# Patient Record
Sex: MALE | Race: WHITE | NOT HISPANIC OR LATINO | Employment: FULL TIME | URBAN - METROPOLITAN AREA
[De-identification: names, ages, dates, MRNs, and addresses within clinical notes are randomized per-mention and may not be internally consistent; named-entity substitution may affect disease eponyms.]

---

## 2021-04-18 ENCOUNTER — HOSPITAL ENCOUNTER (OUTPATIENT)
Dept: MEDSURG UNIT | Facility: HOSPITAL | Age: 26
End: 2021-04-20
Attending: STUDENT IN AN ORGANIZED HEALTH CARE EDUCATION/TRAINING PROGRAM | Admitting: STUDENT IN AN ORGANIZED HEALTH CARE EDUCATION/TRAINING PROGRAM

## 2021-04-18 LAB
ABS NEUT (OLG): 9.58 X10(3)/MCL (ref 2.1–9.2)
ALBUMIN SERPL-MCNC: 4.5 GM/DL (ref 3.5–5)
ALBUMIN/GLOB SERPL: 1.5 RATIO (ref 1.1–2)
ALP SERPL-CCNC: 68 UNIT/L (ref 40–150)
ALT SERPL-CCNC: 54 UNIT/L (ref 0–55)
AMPHET UR QL SCN: NEGATIVE
APPEARANCE, UA: CLEAR
AST SERPL-CCNC: 36 UNIT/L (ref 5–34)
BACTERIA #/AREA URNS AUTO: ABNORMAL /HPF
BARBITURATE SCN PRESENT UR: NEGATIVE
BASOPHILS # BLD AUTO: 0 X10(3)/MCL (ref 0–0.2)
BASOPHILS NFR BLD AUTO: 0 %
BENZODIAZ UR QL SCN: NEGATIVE
BILIRUB SERPL-MCNC: 1 MG/DL
BILIRUB UR QL STRIP: NEGATIVE
BILIRUBIN DIRECT+TOT PNL SERPL-MCNC: 0.3 MG/DL (ref 0–0.5)
BILIRUBIN DIRECT+TOT PNL SERPL-MCNC: 0.7 MG/DL (ref 0–0.8)
BUN SERPL-MCNC: 9.3 MG/DL (ref 8.9–20.6)
CALCIUM SERPL-MCNC: 9.9 MG/DL (ref 8.4–10.2)
CANNABINOIDS UR QL SCN: NEGATIVE
CHLORIDE SERPL-SCNC: 103 MMOL/L (ref 98–107)
CHOLEST SERPL-MCNC: 208 MG/DL
CHOLEST/HDLC SERPL: 3 {RATIO} (ref 0–5)
CO2 SERPL-SCNC: 23 MMOL/L (ref 22–29)
COCAINE UR QL SCN: NEGATIVE
COLOR UR: ABNORMAL
CREAT SERPL-MCNC: 0.79 MG/DL (ref 0.73–1.18)
EOSINOPHIL # BLD AUTO: 0.3 X10(3)/MCL (ref 0–0.9)
EOSINOPHIL NFR BLD AUTO: 2 %
ERYTHROCYTE [DISTWIDTH] IN BLOOD BY AUTOMATED COUNT: 12.3 % (ref 11.5–14.5)
ETHANOL SERPL-MCNC: <10 MG/DL
GLOBULIN SER-MCNC: 3 GM/DL (ref 2.4–3.5)
GLUCOSE (UA): NEGATIVE
GLUCOSE SERPL-MCNC: 142 MG/DL (ref 74–100)
HAV IGM SERPL QL IA: NONREACTIVE
HBV CORE IGM SERPL QL IA: NONREACTIVE
HBV SURFACE AG SERPL QL IA: NONREACTIVE
HCT VFR BLD AUTO: 43.9 % (ref 40–51)
HCV AB SERPL QL IA: NONREACTIVE
HDLC SERPL-MCNC: 62 MG/DL (ref 35–60)
HGB BLD-MCNC: 15.1 GM/DL (ref 13.5–17.5)
HGB UR QL STRIP: NEGATIVE
HIV 1+2 AB+HIV1 P24 AG SERPL QL IA: NONREACTIVE
HYALINE CASTS #/AREA URNS LPF: ABNORMAL /LPF
IMM GRANULOCYTES # BLD AUTO: 0.06 10*3/UL
IMM GRANULOCYTES NFR BLD AUTO: 0 %
KETONES UR QL STRIP: ABNORMAL
LDLC SERPL CALC-MCNC: 115 MG/DL (ref 50–140)
LEUKOCYTE ESTERASE UR QL STRIP: NEGATIVE
LIPASE SERPL-CCNC: 993 U/L
LYMPHOCYTES # BLD AUTO: 1.7 X10(3)/MCL (ref 0.6–4.6)
LYMPHOCYTES NFR BLD AUTO: 14 %
MAGNESIUM SERPL-MCNC: 1.8 MG/DL (ref 1.6–2.6)
MCH RBC QN AUTO: 31.7 PG (ref 26–34)
MCHC RBC AUTO-ENTMCNC: 34.4 GM/DL (ref 31–37)
MCV RBC AUTO: 92 FL (ref 80–100)
MONOCYTES # BLD AUTO: 0.9 X10(3)/MCL (ref 0.1–1.3)
MONOCYTES NFR BLD AUTO: 7 %
NEUTROPHILS # BLD AUTO: 9.58 X10(3)/MCL (ref 2.1–9.2)
NEUTROPHILS NFR BLD AUTO: 76 %
NITRITE UR QL STRIP: NEGATIVE
OPIATES UR QL SCN: POSITIVE
PCP UR QL: NEGATIVE
PH UR STRIP.AUTO: 8 [PH] (ref 3–11)
PH UR STRIP: 8 [PH] (ref 4.5–8)
PHOSPHATE SERPL-MCNC: 1.2 MG/DL (ref 2.3–4.7)
PLATELET # BLD AUTO: 217 X10(3)/MCL (ref 130–400)
PMV BLD AUTO: 11.5 FL (ref 7.4–10.4)
POTASSIUM SERPL-SCNC: 4 MMOL/L (ref 3.5–5.1)
PROT SERPL-MCNC: 7.5 GM/DL (ref 6.4–8.3)
PROT UR QL STRIP: 30 MG/DL
RBC # BLD AUTO: 4.77 X10(6)/MCL (ref 4.5–5.9)
RBC #/AREA URNS AUTO: ABNORMAL /HPF
SARS-COV-2 AG RESP QL IA.RAPID: NEGATIVE
SODIUM SERPL-SCNC: 140 MMOL/L (ref 136–145)
SP GR UR STRIP: >1.03 (ref 1–1.03)
SQUAMOUS #/AREA URNS LPF: ABNORMAL /LPF
T PALLIDUM AB SER QL: NONREACTIVE
TRIGL SERPL-MCNC: 157 MG/DL (ref 34–140)
UROBILINOGEN UR STRIP-ACNC: NORMAL
VLDLC SERPL CALC-MCNC: 31 MG/DL
WBC # SPEC AUTO: 12.6 X10(3)/MCL (ref 4.5–11)
WBC #/AREA URNS AUTO: ABNORMAL /HPF

## 2021-04-19 LAB
ABS NEUT (OLG): 9.62 X10(3)/MCL (ref 2.1–9.2)
ALBUMIN SERPL-MCNC: 3.6 GM/DL (ref 3.5–5)
ALBUMIN/GLOB SERPL: 1.5 RATIO (ref 1.1–2)
ALP SERPL-CCNC: 53 UNIT/L (ref 40–150)
ALT SERPL-CCNC: 35 UNIT/L (ref 0–55)
AST SERPL-CCNC: 27 UNIT/L (ref 5–34)
BASOPHILS # BLD AUTO: 0 X10(3)/MCL (ref 0–0.2)
BASOPHILS NFR BLD AUTO: 0 %
BILIRUB SERPL-MCNC: 1 MG/DL
BILIRUBIN DIRECT+TOT PNL SERPL-MCNC: 0.4 MG/DL (ref 0–0.5)
BILIRUBIN DIRECT+TOT PNL SERPL-MCNC: 0.6 MG/DL (ref 0–0.8)
BUN SERPL-MCNC: 6 MG/DL (ref 8.9–20.6)
CALCIUM SERPL-MCNC: 8.7 MG/DL (ref 8.4–10.2)
CHLORIDE SERPL-SCNC: 103 MMOL/L (ref 98–107)
CO2 SERPL-SCNC: 28 MMOL/L (ref 22–29)
CREAT SERPL-MCNC: 0.64 MG/DL (ref 0.73–1.18)
EOSINOPHIL # BLD AUTO: 0 X10(3)/MCL (ref 0–0.9)
EOSINOPHIL NFR BLD AUTO: 0 %
ERYTHROCYTE [DISTWIDTH] IN BLOOD BY AUTOMATED COUNT: 12.5 % (ref 11.5–14.5)
EST. AVERAGE GLUCOSE BLD GHB EST-MCNC: 96.8 MG/DL
GLOBULIN SER-MCNC: 2.4 GM/DL (ref 2.4–3.5)
GLUCOSE SERPL-MCNC: 109 MG/DL (ref 74–100)
HBA1C MFR BLD: 5 %
HCT VFR BLD AUTO: 39.1 % (ref 40–51)
HGB BLD-MCNC: 13.2 GM/DL (ref 13.5–17.5)
IMM GRANULOCYTES # BLD AUTO: 0.04 10*3/UL
IMM GRANULOCYTES NFR BLD AUTO: 0 %
LYMPHOCYTES # BLD AUTO: 0.8 X10(3)/MCL (ref 0.6–4.6)
LYMPHOCYTES NFR BLD AUTO: 7 %
MAGNESIUM SERPL-MCNC: 1.7 MG/DL (ref 1.6–2.6)
MCH RBC QN AUTO: 31.5 PG (ref 26–34)
MCHC RBC AUTO-ENTMCNC: 33.8 GM/DL (ref 31–37)
MCV RBC AUTO: 93.3 FL (ref 80–100)
MONOCYTES # BLD AUTO: 1 X10(3)/MCL (ref 0.1–1.3)
MONOCYTES NFR BLD AUTO: 8 %
NEUTROPHILS # BLD AUTO: 9.62 X10(3)/MCL (ref 2.1–9.2)
NEUTROPHILS NFR BLD AUTO: 84 %
PHOSPHATE SERPL-MCNC: 3.5 MG/DL (ref 2.3–4.7)
PLATELET # BLD AUTO: 168 X10(3)/MCL (ref 130–400)
PMV BLD AUTO: 12 FL (ref 7.4–10.4)
POTASSIUM SERPL-SCNC: 4.1 MMOL/L (ref 3.5–5.1)
PROT SERPL-MCNC: 6 GM/DL (ref 6.4–8.3)
RBC # BLD AUTO: 4.19 X10(6)/MCL (ref 4.5–5.9)
SODIUM SERPL-SCNC: 138 MMOL/L (ref 136–145)
T4 FREE SERPL-MCNC: 0.74 NG/DL (ref 0.7–1.48)
TSH SERPL-ACNC: 0.62 UIU/ML (ref 0.35–4.94)
WBC # SPEC AUTO: 11.5 X10(3)/MCL (ref 4.5–11)

## 2021-04-20 LAB
ABS NEUT (OLG): 7.52 X10(3)/MCL (ref 2.1–9.2)
ALBUMIN SERPL-MCNC: 3.1 GM/DL (ref 3.5–5)
ALBUMIN/GLOB SERPL: 1.2 RATIO (ref 1.1–2)
ALP SERPL-CCNC: 43 UNIT/L (ref 40–150)
ALT SERPL-CCNC: 24 UNIT/L (ref 0–55)
AST SERPL-CCNC: 19 UNIT/L (ref 5–34)
BASOPHILS # BLD AUTO: 0 X10(3)/MCL (ref 0–0.2)
BASOPHILS NFR BLD AUTO: 0 %
BILIRUB SERPL-MCNC: 1 MG/DL
BILIRUBIN DIRECT+TOT PNL SERPL-MCNC: 0.4 MG/DL (ref 0–0.5)
BILIRUBIN DIRECT+TOT PNL SERPL-MCNC: 0.6 MG/DL (ref 0–0.8)
BUN SERPL-MCNC: 4.8 MG/DL (ref 8.9–20.6)
CALCIUM SERPL-MCNC: 8.4 MG/DL (ref 8.4–10.2)
CHLORIDE SERPL-SCNC: 103 MMOL/L (ref 98–107)
CO2 SERPL-SCNC: 27 MMOL/L (ref 22–29)
CREAT SERPL-MCNC: 0.67 MG/DL (ref 0.73–1.18)
EOSINOPHIL # BLD AUTO: 0 X10(3)/MCL (ref 0–0.9)
EOSINOPHIL NFR BLD AUTO: 0 %
ERYTHROCYTE [DISTWIDTH] IN BLOOD BY AUTOMATED COUNT: 12.3 % (ref 11.5–14.5)
EST CREAT CLEARANCE SER (OHS): 196.88 ML/MIN
GLOBULIN SER-MCNC: 2.6 GM/DL (ref 2.4–3.5)
GLUCOSE SERPL-MCNC: 104 MG/DL (ref 74–100)
HCT VFR BLD AUTO: 35.3 % (ref 40–51)
HGB BLD-MCNC: 11.8 GM/DL (ref 13.5–17.5)
IMM GRANULOCYTES # BLD AUTO: 0.04 10*3/UL
IMM GRANULOCYTES NFR BLD AUTO: 0 %
LYMPHOCYTES # BLD AUTO: 0.7 X10(3)/MCL (ref 0.6–4.6)
LYMPHOCYTES NFR BLD AUTO: 8 %
MAGNESIUM SERPL-MCNC: 1.9 MG/DL (ref 1.6–2.6)
MCH RBC QN AUTO: 31.7 PG (ref 26–34)
MCHC RBC AUTO-ENTMCNC: 33.4 GM/DL (ref 31–37)
MCV RBC AUTO: 94.9 FL (ref 80–100)
MONOCYTES # BLD AUTO: 0.9 X10(3)/MCL (ref 0.1–1.3)
MONOCYTES NFR BLD AUTO: 10 %
NEUTROPHILS # BLD AUTO: 7.52 X10(3)/MCL (ref 2.1–9.2)
NEUTROPHILS NFR BLD AUTO: 82 %
PHOSPHATE SERPL-MCNC: 2.2 MG/DL (ref 2.3–4.7)
PLATELET # BLD AUTO: 139 X10(3)/MCL (ref 130–400)
PMV BLD AUTO: 12.8 FL (ref 7.4–10.4)
POTASSIUM SERPL-SCNC: 4 MMOL/L (ref 3.5–5.1)
PROT SERPL-MCNC: 5.7 GM/DL (ref 6.4–8.3)
RBC # BLD AUTO: 3.72 X10(6)/MCL (ref 4.5–5.9)
SODIUM SERPL-SCNC: 138 MMOL/L (ref 136–145)
WBC # SPEC AUTO: 9.2 X10(3)/MCL (ref 4.5–11)

## 2022-04-30 NOTE — ED PROVIDER NOTES
Patient:   Osei Wellington             MRN: [id number]            FIN: [id number]               Age:   25 years     Sex:  Male     :  1995   Associated Diagnoses:   Acute pancreatitis   Author:   Ranulfo Martinez      Basic Information   Time seen: Immediately upon arrival.   History source: Patient.   Arrival mode: Private vehicle.   History limitation: None.   Additional information: Chief Complaint from Nursing Triage Note : Chief Complaint   2021 9:39 CDT       Chief Complaint           Pt reports bilateral upper quadrant abdominal pain, vomiting, and nausea that started this morning.  .   Provider/Visit info:   Time Seen:  Ranulfo Martinez / 2021 09:42  .   History of Present Illness   The patient presents with abdominal pain.  The onset was approx 0700 this AM.  The course/duration of symptoms is worsening.  The character of symptoms is achy and crampy.  The degree at onset was moderate.  The Location of pain at onset was diffuse, upper and abdominal.  The degree at present is moderate.  The Location of pain at present is diffuse, upper and abdominal.  Radiating pain: none. The exacerbating factor is none.  The relieving factor is none.  Therapy today: none.  Risk factors consist of none.  Associated symptoms: nausea and vomiting.  Additional history: Pt presents to the [facility] ED reporting nausea, vomiting and abdominal pain which began this AM. Reports symptoms upon waking. Denies fever, chest pain, SOB, dizziness, weakness, or diarrhea. Denies chronic medical conditions..        Review of Systems   Constitutional symptoms:  Negative except as documented in HPI.   Skin symptoms:  Negative except as documented in HPI.   Eye symptoms:  Negative except as documented in HPI.   ENMT symptoms:  Negative except as documented in HPI.   Respiratory symptoms:  Negative except as documented in HPI.   Cardiovascular symptoms:  Negative except as documented in HPI.   Gastrointestinal  symptoms:  Negative except as documented in HPI.   Genitourinary symptoms:  Negative except as documented in HPI.   Musculoskeletal symptoms:  Negative except as documented in HPI.   Neurologic symptoms:  Negative except as documented in HPI.   Psychiatric symptoms:  Negative except as documented in HPI.   Endocrine symptoms:  Negative except as documented in HPI.   Hematologic/Lymphatic symptoms:  Negative except as documented in HPI.   Allergy/immunologic symptoms:  Negative except as documented in HPI.      Health Status   Allergies:    Allergic Reactions (Selected)  No Known Allergies,    Allergies (1) Active Reaction  No Known Allergies None Documented  .   Medications:  (Selected)   Inpatient Medications  Ordered  IVF Lactated Ringers LR Infusion 1,000 mL: 1,000 mL, 1,000 mL, IV, 1,000 mL/hr, start date 04/18/21 9:59:00 CDT, 2.16, m2  Zofran 2 mg/mL injectable solution: 4 mg, form: Injection, IV Push, Once PRN for nausea, first dose 04/18/21 9:59:00 CDT, STAT, per nurse's notes.   Immunizations: Per nurse's notes.      Past Medical/ Family/ Social History   Medical history:    No active or resolved past medical history items have been selected or recorded., Reviewed as documented in chart.   Surgical history:    No active procedure history items have been selected or recorded., Reviewed as documented in chart.   Family history:    No family history items have been selected or recorded., Reviewed as documented in chart.   Social history:    Social & Psychosocial Habits    Tobacco  04/18/2021  Use: 10 or more cigarettes (1/    Patient Wants Consult For Cessation Counseling No    Abuse/Neglect  04/18/2021  SHX Any signs of abuse or neglect No    Feels unsafe at home: No    Safe place to go: Yes  , Alcohol use: Regularly, Tobacco use: Regularly, Drug use: Denies.   Problem list:    Active Problems (1)  Tobacco user   .      Physical Examination               Vital Signs   Vital Signs   4/18/2021 9:39 CDT        Temperature Oral          36.8 DegC                             Temperature Oral (calculated)             98.24 DegF                             Peripheral Pulse Rate     70 bpm                             Respiratory Rate          16 br/min                             SpO2                      99 %                             Oxygen Therapy            Room air                             Systolic Blood Pressure   163 mmHg  HI                             Diastolic Blood Pressure  104 mmHg  HI  .      Vital Signs (last 24 hrs)_____  Last Charted___________  Temp Oral     36.8 DegC  (APR 18 09:39)  Heart Rate Peripheral   70 bpm  (APR 18 09:39)  Resp Rate         16 br/min  (APR 18 09:39)  SBP      H 163mmHg  (APR 18 09:39)  DBP      H 104mmHg  (APR 18 09:39)  SpO2      99 %  (APR 18 09:39)  .   Measurements   4/18/2021 9:39 CDT       Weight Dosing             88 kg                             Weight Measured and Calculated in Lbs     194.00 lb                             Weight Estimated          88 kg                             Height/Length Dosing      190 cm                             Height/Length Estimated   190 cm                             Body Mass Index Estimated 24.38 kg/m2  .   Basic Oxygen Information   4/18/2021 9:39 CDT       SpO2                      99 %                             Oxygen Therapy            Room air  .   General:  Alert, mild distress.    Skin:  Warm, dry, pink, intact.    Head:  Normocephalic, atraumatic.    Neck:  Supple, trachea midline, no tenderness.    Eye:  Pupils are equal, round and reactive to light, extraocular movements are intact, normal conjunctiva, vision unchanged.    Ears, nose, mouth and throat:  Tympanic membranes clear, oral mucosa moist, no pharyngeal erythema or exudate.    Cardiovascular:  Regular rate and rhythm, No murmur, Normal peripheral perfusion, No edema.    Respiratory:  Lungs are clear to auscultation, respirations are non-labored, breath sounds are  equal, Symmetrical chest wall expansion.    Chest wall:  No tenderness, No deformity.    Back:  Nontender, Normal range of motion, Normal alignment, No costovertebral angle tenderness,    Musculoskeletal:  Normal ROM, normal strength, no tenderness, no swelling.    Gastrointestinal:  Soft, Non distended, Normal bowel sounds, Tenderness: Moderate, right upper quadrant, left upper quadrant, Guarding: Negative, Rebound: Negative, Organomegaly: Negative, Mass: Negative, Signs: McBurney's negative, Davis's negative.    Neurological:  Alert and oriented to person, place, time, and situation, No focal neurological deficit observed, CN II-XII intact, normal sensory observed, normal motor observed, normal speech observed, normal coordination observed.    Lymphatics:  No lymphadenopathy.   Psychiatric:  Cooperative, appropriate mood & affect, normal judgment.       Medical Decision Making   Differential Diagnosis:  Abdominal pain, biliary colic, cholecystitis, pancreatitis.    Documents reviewed:  Emergency department nurses' notes.   Results review:     No qualifying data available, All Results   4/18/2021 10:44 CDT      Est Creat Clearance Ser   166.97 mL/min    4/18/2021 10:15 CDT      WBC                       12.6 x10(3)/mcL  HI                             RBC                       4.77 x10(6)/mcL                             Hgb                       15.1 gm/dL                             Hct                       43.9 %                             Platelet                  217 x10(3)/mcL                             MCV                       92.0 fL                             MCH                       31.7 pg                             MCHC                      34.4 gm/dL                             RDW                       12.3 %                             MPV                       11.5 fL  HI                             Abs Neut                  9.58 x10(3)/mcL  HI                             Neutro Auto                76 %  NA                             Lymph Auto                14 %  NA                             Mono Auto                 7 %  NA                             Eos Auto                  2 %  NA                             Abs Eos                   0.3 x10(3)/mcL                             Basophil Auto             0 %  NA                             Abs Neutro                9.58 x10(3)/mcL  HI                             Abs Lymph                 1.7 x10(3)/mcL                             Abs Mono                  0.9 x10(3)/mcL                             Abs Baso                  0.0 x10(3)/mcL                             IG%                       0 %  NA                             IG#                       0.060  NA                             Sodium Lvl                140 mmol/L                             Potassium Lvl             4.0 mmol/L                             Chloride                  103 mmol/L                             CO2                       23 mmol/L                             Calcium Lvl               9.9 mg/dL                             Magnesium Lvl             1.80 mg/dL                             Glucose Lvl               142 mg/dL  HI                             BUN                       9.3 mg/dL                             Creatinine                0.79 mg/dL                             eGFR-AA                   >105                             eGFR-VISHAL                  >105 mL/min/1.73 m2                             Bili Total                1.0 mg/dL                             Bili Direct               0.3 mg/dL                             Bili Indirect             0.70 mg/dL                             AST                       36 unit/L  HI                             ALT                       54 unit/L                             Alk Phos                  68 unit/L                             Total Protein             7.5 gm/dL                             Albumin Lvl                4.5 gm/dL                             Globulin                  3.0 gm/dL                             A/G Ratio                 1.5 ratio                             Phosphorus                1.2 mg/dL  LOW                             Lipase Lvl                993 U/L  HI                             Ethanol Lvl               <10.0 mg/dL  NA    , Interpretation Interpreted by KEESHA IRBY.    Radiology results:  Computed tomography, discussed with radiologist, emergency physician interpretation: Radiologist report reviewed per KEESHA IRBY, Rad Results (ST)  < 12 hrs   Accession: [id number]  Order: CT Abdomen and Pelvis W Contrast  Report Dt/Tm: 04/18/2021 11:48  Report:   EXAMINATION  CT Abdomen and Pelvis W Contrast     TECHNIQUE  Helical-acquisition CT images were obtained following the intravenous  administration of iodinated contrast media. Enteric contrast was not  utilized.  Multiplanar reformats were accomplished by a CT technologist at a  separate workstation and pushed to PACS for physician review.     Automated tube current modulation and/or weight-based exposure dosing  is utilized, when appropriate, to reach lowest reasonably achievable  exposure rate.     INDICATION  Bilateral upper abdominal pain, nausea/vomiting     Comparison: No similar modality comparisons are available at the time  of exam interpretation.     FINDINGS  Images were reviewed in soft tissue, lung, and bone windows.     Exam quality: adequate     Lines/tubes: None visualized.     Lower Thoracic Cavity       Heart: No acute or focal abnormality.       Lung bases: No acute or focal abnormality.       Esophagus: Visualized lower portion is unremarkable.     Hepatobiliary       Liver: Mildly enlarged, with craniocaudal length measuring 19.3  cm at the mid clavicular line. The liver parenchyma is diffusely  decreased attenuation, suggesting fat infiltration. No space-occupying  lesion or suspicious enhancement is  identified.       Gallbladder: No calcified stone, wall thickening, or  pericholecystic inflammation.       Bile ducts: No convincing obstructive process.     Pancreas: There is diffusely heterogeneous and somewhat  low-attenuation appearance of the pancreatic parenchyma, suggesting  edema. No focal mass-like lesion or suspicious enhancement is  identified. There is no ductal dilatation. Widespread disorganized  peripancreatic fluid is noted through the upper abdomen. No loculated  component is evident.     Spleen: No acute or focal abnormality.     Adrenal glands: No acute or focal abnormality.     Genitourinary       Kidneys/ureters: No solid lesion or complex cyst. No urolithiasis  or evidence of obstructive uropathy.       Urinary bladder: No focal wall thickening or convincing  intraluminal abnormality.       Prostate: Unremarkable for CT assessment.     Stomach/Bowel: No evidence of gastric outlet or small bowel  obstruction. No acute inflammatory process or convincing focal lesion.  The appendix is unremarkable.     Peritoneal cavity/Retroperitoneum: Free fluid extends into the lower  abdominal cavity. No drainable collection is identified. There is no  free intra-abdominal air.  Vasculature: Normal caliber and contour.  Lymph nodes: No pathologic enlargement or necrotic process.     Musculoskeletal       Subcutaneous/musculature: No acute or focal abnormality.       Osseous: No acute displacement or suspicious focal abnormality.     IMPRESSION  1.  Findings of acute interstitial edematous pancreatitis with  moderate volume of disorganized peripancreatic fluid. Correlation with  pertinent clinical details and lipase level recommended.  2.  No evidence of focal pancreatic lesion, necrotic changes, or  loculated/drainable collection.  3.  Hepatomegaly with changes of steatosis.      .       Reexamination/ Reevaluation   Time: 4/18/2021 12:08:00 .   Course: improving.   Pain status: decreased.   Notes:  Reassessed patient at this time. Reports condition has improved. Discussed with patient all pertinent ED information and results. Discussed diagnosis and treatment plan with patient. Follow up instructions and return to ED instruction have been given. All questions and concerns were addressed at this time. Patient voices understanding of information and instructions. Patient is comfortable with plan and discharge. Patient is stable for discharge. .      Impression and Plan   Diagnosis   Acute pancreatitis ([id number] K85.90)      Calls-Consults   -  4/18/2021 12:06:00 , IM, Discussed pt status with On Call Team. Physician will see pt at bedside..    Plan   Disposition: Admit time  4/18/2021 12:09:00, Admit to Inpatient Unit, IM On Call, Patient care transitioned to: Time: 4/18/2021 12:09:00, Lakhwinder HUSSEIN, Kareem SCHMIDT    Patient was given the following educational materials: Acute Pancreatitis, Easy-to-Read.    Counseled: Patient, Regarding diagnosis, Regarding treatment plan, Patient indicated understanding of instructions.       Addendum      Teaching-Supervisory Addendum-Brief   I participated in the following activities of this patients care: the medical history, the physical exam, medical decision making.   I personally performed: supervision of the patient's care, the medical history, the medical decision making.   The case was discussed with: the nurse practitioner.   Evaluation and management service: I agree with the evaluation and management decisions made in this patient's care.   Results interpretation: I agree with the documentation of the study interpretation.   Notes: I, Dr. KEESHA Dotson FACEP, performed a face to face evaluation of this Pt Osei Wellington  and my physical exam reveal mild abdominal tenderness/history of alcohol abuse, presents with abdominal pain associated to nausea and vomiting. This case was initially evaluated by KEESHA Gatica (NP)  under my supervision and I agree with his  documentation, procedures and plan. Labs and Imaging consistent with acute pancreatitis for which will admit. I personally performed the history, physical, imaging review, MDM and admission procedure for this patient. .

## 2022-05-03 NOTE — HISTORICAL [facility]
This is a historical note converted from [place]. Formatting and pictures may have been removed.  Please reference [place] for original formatting and attached multimedia. Chief Complaint  Pt reports bilateral upper quadrant abdominal pain, vomiting, and nausea that started this morning.  History of Present Illness  Mr. Wellington is a 24 y/o male with no significant past?history presenting with?midepigastric pain?that started around 6 AM this morning associated with nausea?and nonbloody nonbilious emesis.? Patient?states that he?drinks about?8 beers a day?for the past 3 years.? He also smokes?4 cigarettes/day?for the last 4 months with previous history of 1 pack/day.? He states that he has never experienced symptoms like this before. ?Denies any chest pain, shortness of breath,?or palpitations.? No diarrhea or constipation.??No?hematochezia or?hematemesis.  ?  In the ED, patient was?hypertensive and afebrile.? He states that he?has been told in the past that he had hypertension?but has never taking any medications?or follows with PCP.? He was given?morphine for pain and initiated on IV fluids. ?CT abdomen?and pelvis consistent with acute pancreatitis.? Lipase elevated?to 993.WBC of 12.6. ?Admitted to medicine?for acute pancreatitis.  Review of Systems  Constitutional:?no fever, fatigue, weakness  Eye:?no vision loss, eye redness, drainage, or pain  ENMT:?no sore throat, ear pain, sinus pain/congestion, nasal congestion/drainage  Respiratory:?no cough, no wheezing, no shortness of breath  Cardiovascular:?no chest pain, no palpitations, no edema  Gastrointestinal:?no nausea, vomiting, or diarrhea. No abdominal pain  Genitourinary:?no dysuria, no urinary frequency or urgency, no hematuria  Hema/Lymph:?no abnormal bruising or bleeding  Endocrine:?no heat or cold intolerance, no excessive thirst or excessive urination  Musculoskeletal:?no muscle or joint pain, no joint swelling  Integumentary:?no skin rash or abnormal  lesion  Neurologic: no headache, no dizziness, no weakness or numbness  ?  Physical Exam  Vitals & Measurements  T:?37? ?C (Oral)? TMIN:?36.8? ?C (Oral)? TMAX:?37? ?C (Oral)? HR:?81(Peripheral)? RR:?20? BP:?163/81? SpO2:?99%? WT:?88?kg? BMI:?24.38?  General:?well-developed well-nourished in no acute distress  Eye:? EOMI, clear conjunctiva, eyelids normal  HENT:?oropharynx without erythema/exudate, oropharynx and nasal mucosal surfaces moist; poor dentition  Neck: full range of motion, no thyromegaly or lymphadenopathy  Respiratory:?clear to auscultation bilaterally  Cardiovascular:?regular rate and rhythm without murmurs, gallops or rubs  Gastrointestinal:?soft, non-distended with normal bowel sounds, without masses to palpation; mid-epigastric pain to palpation  Genitourinary: no CVA tenderness to palpation  Musculoskeletal:?full range of motion of all extremities/spine without limitation or discomfort  Integumentary: no rashes or skin lesions present  Neurologic: cranial nerves intact, no signs of peripheral neurological deficit, motor/sensory function intact  ?  Assessment/Plan  Acute pancreatitis?- likely 2/2 alcohol abuse  -mid-epigastric abdominal pain  -CT abd/pelvis consistent with acute pancreatitis  -lipase of 993  -WBC 12.6  -pt with significant alcohol abuse history  -ordered lipid panel  -Dilaudid 1mg q4hr PRN for pain  ?  HTN  -hypertensive on admit  -hydralazine 10mg q2hr PRN  -consider starting anti-hypertensive prior to discharge if still elevated  ?  Alcohol abuse  -CIWA protocol initiated, thiamine, folic acid  -monitor for withdrawals  ?  Tobacco abuse  -consider nicotine patch  ?  PPx: Lovenox  Abx: None  IVF: NS @ 250cc/hr  Lines: PIV  Diet: advance as tolerated  ?  ?  Margaret Garcia  PGY-1, [facility] Internal Medicine  ?   --Addendum  Geovanny Locke  PGY2  ?  24 y/o male with pmh of significant? ETOH?use/abuse and tobacco use presenting with?cramping epigastric pain?w/ associated nausea  and ?NBNB vomiting early this morning.? Patient?states that he?drinks about?8 beers?per day x??multiple years.? Denies any hematemesis, hemoptysis,?diarrhea, hematochezia,melena?CP, SOB. palpitations, LE edema.?Previous 1 ppd smoking hx.?Denies previous similar episode. Patient? afebrile, non-tachycardic, on RA, slightly hypertensive.? Labs reveal lipase 993, and slight leukocytosis.  ?   PE: NAD, NC/AT,CTABL, RRR, S1,S1, no JVD, abdomen ttp epigastric region w/o rebound NR/NG, no edema  ?  ?   Acute pancreatis  ETOH abuse  Leukocytosis  HTN  -3 of 3 criteria (typical epigastric pain, lipase >2/3 ULN, CT imaging c/w Acute interstitial edematous pancreatitis with  moderate volume of disorganized peripancreatic fluid  -likely 2/2 ETOH given hx  -However, f/u TG levels  -NS 250ml/h  -NPO, Advance as tolerated  -Dilaudid PRN for pain  -CIWA protocol  -thiamine, folic acid  -HTN pain related vs other  -Continue to monitor  -DVT ppx  -Loven  --PRN IV BP bp control  -NPO, advance as tolerated  ?   Faculty addendum:  I have reviewed the patients history, residents ?findings on physical examination, diagnosis and treatment plan. Care provided was reasonable and necessary.  ?  ?  Patient is in examine the morning of 4/19/21  Acute pancreatitis secondary to alcohol use,?CT does not reveal?evidence of biliary/gallstone pathology, triglycerides 157,?continue advanced diet as tolerated  , continuing with isotonic crystalloids, 250 mL/h  hopeful For discharge in the next 24-48 hours.?  ?  ?   Problem List/Past Medical History  Ongoing  No qualifying data  Historical  No qualifying data  Medications  Inpatient  Dilaudid, 1 mg= 0.5 mL, IV Push, q4hr, PRN  folic acid 1 mg oral tablet, 1 mg= 1 tab(s), Oral, Daily  hydrALAZINE (Apresoline) Inj., 10 mg= 0.5 mL, IV Push, q2hr, PRN  IVF Lactated Ringers LR Infusion 1,000 mL, 1000 mL, IV  Lovenox, 40 mg= 0.4 mL, Subcutaneous, Daily  NS (0.9% Sodium Chloride) Infusion 1,000 mL, 1000 mL,  IV  thiamine, 100 mg= 1 tab(s), Oral, Daily  Zofran, 4 mg= 2 mL, IV Push, q4hr, PRN  Home  No active home medications  Allergies  No Known Allergies  Social History  Abuse/Neglect  No, No, Yes, 04/18/2021  Tobacco  10 or more cigarettes (1/2 pack or more)/day in last 30 days, No, 04/18/2021  Lab Results  Labs Last 24 Hours?  ?Chemistry? Hematology/Coagulation?   Sodium Lvl: 140 mmol/L (04/18/21 10:15:00) WBC:?12.6 x10(3)/mcL?High (04/18/21 10:15:00)   Potassium Lvl: 4 mmol/L (04/18/21 10:15:00) RBC: 4.77 x10(6)/mcL (04/18/21 10:15:00)   Chloride: 103 mmol/L (04/18/21 10:15:00) Hgb: 15.1 gm/dL (04/18/21 10:15:00)   CO2: 23 mmol/L (04/18/21 10:15:00) Hct: 43.9 % (04/18/21 10:15:00)   Calcium Lvl: 9.9 mg/dL (04/18/21 10:15:00) Platelet: 217 x10(3)/mcL (04/18/21 10:15:00)   Magnesium Lvl: 1.8 mg/dL (04/18/21 10:15:00) MCV: 92 fL (04/18/21 10:15:00)   Glucose Lvl:?142 mg/dL?High (04/18/21 10:15:00) MCH: 31.7 pg (04/18/21 10:15:00)   BUN: 9.3 mg/dL (04/18/21 10:15:00) MCHC: 34.4 gm/dL (04/18/21 10:15:00)   Creatinine: 0.79 mg/dL (04/18/21 10:15:00) RDW: 12.3 % (04/18/21 10:15:00)   Est Creat Clearance Ser: 166.97 mL/min (04/18/21 10:44:35) MPV:?11.5 fL?High (04/18/21 10:15:00)   eGFR-AA: >105 (04/18/21 10:15:00) Abs Neut:?9.58 x10(3)/mcL?High (04/18/21 10:15:00)   eGFR-VISHAL: >105 (04/18/21 10:15:00) Neutro Auto: 76 % (04/18/21 10:15:00)   Bili Total: 1 mg/dL (04/18/21 10:15:00) Lymph Auto: 14 % (04/18/21 10:15:00)   Bili Direct: 0.3 mg/dL (04/18/21 10:15:00) Mono Auto: 7 % (04/18/21 10:15:00)   Bili Indirect: 0.7 mg/dL (04/18/21 10:15:00) Eos Auto: 2 % (04/18/21 10:15:00)   AST:?36 unit/L?High (04/18/21 10:15:00) Abs Eos: 0.3 x10(3)/mcL (04/18/21 10:15:00)   ALT: 54 unit/L (04/18/21 10:15:00) Basophil Auto: 0 % (04/18/21 10:15:00)   Alk Phos: 68 unit/L (04/18/21 10:15:00) Abs Neutro:?9.58 x10(3)/mcL?High (04/18/21 10:15:00)   Total Protein: 7.5 gm/dL (04/18/21 10:15:00) Abs Lymph: 1.7 x10(3)/mcL (04/18/21 10:15:00)    Albumin Lvl: 4.5 gm/dL (04/18/21 10:15:00) Abs Mono: 0.9 x10(3)/mcL (04/18/21 10:15:00)   Globulin: 3 gm/dL (04/18/21 10:15:00) Abs Baso: 0 x10(3)/mcL (04/18/21 10:15:00)   A/G Ratio: 1.5 ratio (04/18/21 10:15:00) IG%: 0 % (04/18/21 10:15:00)   Phosphorus:?1.2 mg/dL?Low (04/18/21 10:15:00) IG#: 0.06 (04/18/21 10:15:00)   Lipase Lvl:?993 U/L?High (04/18/21 10:15:00)    U pH: 8 (04/18/21 10:00:00)    Diagnostic Results  Accession:?[id number]  Order:?CT Abdomen and Pelvis W Contrast  Report Dt/Tm:?04/18/2021 11:48  Report:?  EXAMINATION  CT Abdomen and Pelvis W Contrast  ?  TECHNIQUE  Helical-acquisition CT images were obtained following the intravenous  administration of iodinated contrast media. Enteric contrast was not  utilized.  Multiplanar reformats were accomplished by a CT technologist at a  separate workstation and pushed to PACS for physician review.  ?  Automated tube current modulation and/or weight-based exposure dosing  is utilized, when appropriate, to reach lowest reasonably achievable  exposure rate.  ?  INDICATION  Bilateral upper abdominal pain, nausea/vomiting  ?  Comparison: No similar modality comparisons are available at the time  of exam interpretation.  ?  FINDINGS  Images were reviewed in soft tissue, lung, and bone windows.  ?  Exam quality: adequate  ?  Lines/tubes: None visualized.  ?  Lower Thoracic Cavity  ?? ? Heart: No acute or focal abnormality.  ?? ? Lung bases: No acute or focal abnormality.  ?? ? Esophagus: Visualized lower portion is unremarkable.  ?  Hepatobiliary  ?? ? Liver: Mildly enlarged, with craniocaudal length measuring 19.3  cm at the mid clavicular line. The liver parenchyma is diffusely  decreased attenuation, suggesting fat infiltration. No space-occupying  lesion or suspicious enhancement is identified.  ?? ? Gallbladder: No calcified stone, wall thickening, or  pericholecystic inflammation.  ?? ? Bile ducts: No convincing obstructive process.  ?  Pancreas: There  is diffusely heterogeneous and somewhat  low-attenuation appearance of the pancreatic parenchyma, suggesting  edema. No focal mass-like lesion or suspicious enhancement is  identified. There is no ductal dilatation. Widespread disorganized  peripancreatic fluid is noted through the upper abdomen. No loculated  component is evident.  ?  Spleen: No acute or focal abnormality.  ?  Adrenal glands: No acute or focal abnormality.  ?  Genitourinary  ?? ? Kidneys/ureters: No solid lesion or complex cyst. No urolithiasis  or evidence of obstructive uropathy.  ?? ? Urinary bladder: No focal wall thickening or convincing  intraluminal abnormality.  ?? ? Prostate: Unremarkable for CT assessment.  ?  Stomach/Bowel: No evidence of gastric outlet or small bowel  obstruction. No acute inflammatory process or convincing focal lesion.  The appendix is unremarkable.  ?  Peritoneal cavity/Retroperitoneum: Free fluid extends into the lower  abdominal cavity. No drainable collection is identified. There is no  free intra-abdominal air.  Vasculature: Normal caliber and contour.  Lymph nodes: No pathologic enlargement or necrotic process.  ?  Musculoskeletal  ?? ? Subcutaneous/musculature: No acute or focal abnormality.  ?? ? Osseous: No acute displacement or suspicious focal abnormality.  ?  IMPRESSION  1. ?Findings of acute interstitial edematous pancreatitis with  moderate volume of disorganized peripancreatic fluid. Correlation with  pertinent clinical details and lipase level recommended.  2. ?No evidence of focal pancreatic lesion, necrotic changes, or  loculated/drainable collection.  3. ?Hepatomegaly with changes of steatosis.  ?

## 2022-05-03 NOTE — HISTORICAL [facility]
This is a historical note converted from [place]. Formatting and pictures may have been removed.  Please reference [place] for original formatting and attached multimedia. Admit and Discharge Dates  Admit Date: 04/18/2021  Discharge Date: 04/20/2021  Physicians  Attending Physician - Jeferson Birch DO  Admitting Physician - Jeferson Birch DO  Primary Care Physician - No PCP, No  Discharge Diagnosis  Acute pancreatitis?- likely 2/2 alcohol use  HTN  Alcohol Use Disorder  Surgical Procedures  No procedures recorded for this visit.  Immunizations  No immunizations recorded for this visit.  Admission Information  Mr. Wellington is a 26 y/o male with no significant past?history presenting with?midepigastric pain?that started around 6 AM this morning associated with nausea?and nonbloody nonbilious emesis.? Patient?states that he?drinks about?8 beers a day?for the past 3 years.? He also smokes?4 cigarettes/day?for the last 4 months with previous history of 1 pack/day.? He states that he has never experienced symptoms like this before. ?Denies any chest pain, shortness of breath,?or palpitations.? No diarrhea or constipation.??No?hematochezia or?hematemesis.  In the ED, patient was?hypertensive and afebrile.? He states that he?has been told in the past that he had hypertension?but has never taking any medications?or follows with PCP.? He was given?morphine for pain and initiated on IV fluids. ?CT abdomen?and pelvis consistent with acute pancreatitis.? Lipase elevated?to 993.WBC of 12.6. ?Admitted to medicine?for acute pancreatitis.  Review of Systems  Hospital Course  This is a 25-year old male with hx of alcohol use disorder who was admitted on 4/18 with a diagnosis of acute pancreatitis. The patient was given IV fluids, pain medications and his diet was slowly advanced over the next two days. As of 4/20, the patient was tolerating foods well and had had a bowel movement. The patient stated he was ready to be discharged.  Time  Spent on discharge  > 30 minutes  Objective  Vitals & Measurements  T:?36.4? ?C (Oral)? TMIN:?36.4? ?C (Oral)? TMAX:?37.6? ?C (Oral)? HR:?73(Peripheral)? RR:?20? BP:?151/95? SpO2:?98%?  Physical Exam  General:?well-developed well-nourished in no acute distress  HEENT: Normocephalic, atraumatic  Respiratory:?clear to auscultation bilaterally  Cardiovascular:?regular rate and rhythm without murmurs, gallops or rubs  Gastrointestinal: mid-epigastric?tenderness to palpation,?hypoactive?bowel sounds,?no rebound  Genitourinary: no CVA tenderness to palpation  Musculoskeletal:?moves extremities purposefully without discomfort  Integumentary: no rashes or skin lesions present to abdomen or flanks  Neurologic: cranial nerves II-VII grossly intact, no signs of peripheral neurological deficit, motor/sensory function intact  Patient Discharge Condition  The patient is clinically and hemodynamically stable for discharge.  Discharge Disposition  1) The patient will be discharged home.  2) The patient will be discharged with 4 days of 5 mg?Percocet and Miralax powder.  3) The patient is to follow up with [facility], in post wards clinic.  4) The patient was given return precautions for new or worsening symptoms.  ?  Faculty addendum:  I have reviewed the patients history, residents ?findings on physical examination, diagnosis and treatment plan. Care provided was reasonable and necessary.   Discharge Medication Reconciliation  Prescribed  acetaminophen-oxyCODONE (Percocet 5/325 oral tablet)?1 tab(s), Oral, q6hr, PRN pain  polyethylene glycol 3350 (MiraLax oral powder for reconstitution)?17 gm, Oral, Daily  Education and Orders Provided  Acute Pancreatitis, Easy-to-Read  Discharge - 04/20/21 13:29:00 CDT, Home?  Follow up  Report to Emergency Department if symptoms return or worsen  [facility]  ????Please follow up with [facility]div[facility] 2, in post wards clinic  Car Seat Challenge  No Qualifying Data

## 2023-01-11 ENCOUNTER — HOSPITAL ENCOUNTER (INPATIENT)
Facility: HOSPITAL | Age: 28
LOS: 2 days | Discharge: HOME | DRG: 440 | End: 2023-01-13
Attending: FAMILY MEDICINE | Admitting: INTERNAL MEDICINE

## 2023-01-11 DIAGNOSIS — K85.90 ACUTE PANCREATITIS, UNSPECIFIED COMPLICATION STATUS, UNSPECIFIED PANCREATITIS TYPE: ICD-10-CM

## 2023-01-11 DIAGNOSIS — R10.13 EPIGASTRIC PAIN: Primary | ICD-10-CM

## 2023-01-11 DIAGNOSIS — K86.1 CHRONIC PANCREATITIS, UNSPECIFIED PANCREATITIS TYPE: ICD-10-CM

## 2023-01-11 LAB
ALBUMIN SERPL-MCNC: 4.6 G/DL (ref 3.5–5)
ALBUMIN/GLOB SERPL: 1.6 RATIO (ref 1.1–2)
ALP SERPL-CCNC: 59 UNIT/L (ref 40–150)
ALP SERPL-CCNC: 60 UNIT/L (ref 40–150)
ALT SERPL-CCNC: 20 UNIT/L (ref 0–55)
AMYLASE SERPL-CCNC: 2438 UNIT/L (ref 25–125)
AST SERPL-CCNC: 24 UNIT/L (ref 5–34)
BASOPHILS # BLD AUTO: 0.05 X10(3)/MCL (ref 0–0.2)
BASOPHILS NFR BLD AUTO: 0.4 %
BILIRUBIN DIRECT+TOT PNL SERPL-MCNC: 0.9 MG/DL
BUN SERPL-MCNC: 10.8 MG/DL (ref 8.9–20.6)
CALCIUM SERPL-MCNC: 9.5 MG/DL (ref 8.4–10.2)
CHLORIDE SERPL-SCNC: 103 MMOL/L (ref 98–107)
CHOLEST SERPL-MCNC: 162 MG/DL
CHOLEST/HDLC SERPL: 2 {RATIO} (ref 0–5)
CO2 SERPL-SCNC: 24 MMOL/L (ref 22–29)
CREAT SERPL-MCNC: 0.84 MG/DL (ref 0.73–1.18)
EOSINOPHIL # BLD AUTO: 0.17 X10(3)/MCL (ref 0–0.9)
EOSINOPHIL NFR BLD AUTO: 1.5 %
ERYTHROCYTE [DISTWIDTH] IN BLOOD BY AUTOMATED COUNT: 12 % (ref 11.5–17)
ETHANOL SERPL-MCNC: <10 MG/DL
GFR SERPLBLD CREATININE-BSD FMLA CKD-EPI: >60 MLS/MIN/1.73/M2
GLOBULIN SER-MCNC: 2.8 GM/DL (ref 2.4–3.5)
GLUCOSE SERPL-MCNC: 110 MG/DL (ref 74–100)
HCT VFR BLD AUTO: 41 % (ref 42–52)
HDLC SERPL-MCNC: 65 MG/DL (ref 35–60)
HGB BLD-MCNC: 14.1 GM/DL (ref 14–18)
IMM GRANULOCYTES # BLD AUTO: 0.05 X10(3)/MCL (ref 0–0.04)
IMM GRANULOCYTES NFR BLD AUTO: 0.4 %
LDH SERPL-CCNC: 248 U/L (ref 125–220)
LDLC SERPL CALC-MCNC: 71 MG/DL (ref 50–140)
LIPASE SERPL-CCNC: >3000 U/L
LYMPHOCYTES # BLD AUTO: 1.45 X10(3)/MCL (ref 0.6–4.6)
LYMPHOCYTES NFR BLD AUTO: 12.7 %
MAGNESIUM SERPL-MCNC: 2 MG/DL (ref 1.6–2.6)
MCH RBC QN AUTO: 30.9 PG
MCHC RBC AUTO-ENTMCNC: 34.4 MG/DL (ref 33–36)
MCV RBC AUTO: 89.9 FL (ref 80–94)
MONOCYTES # BLD AUTO: 0.69 X10(3)/MCL (ref 0.1–1.3)
MONOCYTES NFR BLD AUTO: 6 %
NEUTROPHILS # BLD AUTO: 9.03 X10(3)/MCL (ref 2.1–9.2)
NEUTROPHILS NFR BLD AUTO: 79 %
NRBC BLD AUTO-RTO: 0 %
PLATELET # BLD AUTO: 251 X10(3)/MCL (ref 130–400)
PMV BLD AUTO: 11.6 FL (ref 7.4–10.4)
POTASSIUM SERPL-SCNC: 4.1 MMOL/L (ref 3.5–5.1)
PROT SERPL-MCNC: 7.4 GM/DL (ref 6.4–8.3)
RBC # BLD AUTO: 4.56 X10(6)/MCL (ref 4.7–6.1)
SODIUM SERPL-SCNC: 138 MMOL/L (ref 136–145)
TRIGL SERPL-MCNC: 129 MG/DL (ref 34–140)
VLDLC SERPL CALC-MCNC: 26 MG/DL
WBC # SPEC AUTO: 11.4 X10(3)/MCL (ref 4.5–11.5)

## 2023-01-11 PROCEDURE — 82077 ASSAY SPEC XCP UR&BREATH IA: CPT | Performed by: STUDENT IN AN ORGANIZED HEALTH CARE EDUCATION/TRAINING PROGRAM

## 2023-01-11 PROCEDURE — 11000001 HC ACUTE MED/SURG PRIVATE ROOM

## 2023-01-11 PROCEDURE — 80053 COMPREHEN METABOLIC PANEL: CPT | Performed by: PHYSICIAN ASSISTANT

## 2023-01-11 PROCEDURE — 80061 LIPID PANEL: CPT | Performed by: STUDENT IN AN ORGANIZED HEALTH CARE EDUCATION/TRAINING PROGRAM

## 2023-01-11 PROCEDURE — 25500020 PHARM REV CODE 255: Performed by: FAMILY MEDICINE

## 2023-01-11 PROCEDURE — 63600175 PHARM REV CODE 636 W HCPCS: Performed by: STUDENT IN AN ORGANIZED HEALTH CARE EDUCATION/TRAINING PROGRAM

## 2023-01-11 PROCEDURE — 99285 EMERGENCY DEPT VISIT HI MDM: CPT | Mod: 25

## 2023-01-11 PROCEDURE — 83615 LACTATE (LD) (LDH) ENZYME: CPT | Performed by: STUDENT IN AN ORGANIZED HEALTH CARE EDUCATION/TRAINING PROGRAM

## 2023-01-11 PROCEDURE — 63600175 PHARM REV CODE 636 W HCPCS

## 2023-01-11 PROCEDURE — 85025 COMPLETE CBC W/AUTO DIFF WBC: CPT | Performed by: PHYSICIAN ASSISTANT

## 2023-01-11 PROCEDURE — 96375 TX/PRO/DX INJ NEW DRUG ADDON: CPT

## 2023-01-11 PROCEDURE — 96374 THER/PROPH/DIAG INJ IV PUSH: CPT

## 2023-01-11 PROCEDURE — 96361 HYDRATE IV INFUSION ADD-ON: CPT

## 2023-01-11 PROCEDURE — 63600175 PHARM REV CODE 636 W HCPCS: Performed by: PHYSICIAN ASSISTANT

## 2023-01-11 PROCEDURE — 25000003 PHARM REV CODE 250: Performed by: FAMILY MEDICINE

## 2023-01-11 PROCEDURE — 83690 ASSAY OF LIPASE: CPT | Performed by: PHYSICIAN ASSISTANT

## 2023-01-11 PROCEDURE — 82150 ASSAY OF AMYLASE: CPT | Performed by: PHYSICIAN ASSISTANT

## 2023-01-11 PROCEDURE — 84075 ASSAY ALKALINE PHOSPHATASE: CPT | Performed by: STUDENT IN AN ORGANIZED HEALTH CARE EDUCATION/TRAINING PROGRAM

## 2023-01-11 PROCEDURE — 80307 DRUG TEST PRSMV CHEM ANLYZR: CPT | Performed by: STUDENT IN AN ORGANIZED HEALTH CARE EDUCATION/TRAINING PROGRAM

## 2023-01-11 PROCEDURE — 83735 ASSAY OF MAGNESIUM: CPT | Performed by: PHYSICIAN ASSISTANT

## 2023-01-11 RX ORDER — ONDANSETRON 2 MG/ML
INJECTION INTRAMUSCULAR; INTRAVENOUS
Status: COMPLETED
Start: 2023-01-11 | End: 2023-01-11

## 2023-01-11 RX ORDER — HYDRALAZINE HYDROCHLORIDE 20 MG/ML
10 INJECTION INTRAMUSCULAR; INTRAVENOUS EVERY 6 HOURS PRN
Status: DISCONTINUED | OUTPATIENT
Start: 2023-01-11 | End: 2023-01-13 | Stop reason: HOSPADM

## 2023-01-11 RX ORDER — KETOROLAC TROMETHAMINE 30 MG/ML
15 INJECTION, SOLUTION INTRAMUSCULAR; INTRAVENOUS
Status: COMPLETED | OUTPATIENT
Start: 2023-01-11 | End: 2023-01-11

## 2023-01-11 RX ORDER — ENOXAPARIN SODIUM 100 MG/ML
40 INJECTION SUBCUTANEOUS EVERY 24 HOURS
Status: DISCONTINUED | OUTPATIENT
Start: 2023-01-11 | End: 2023-01-13 | Stop reason: HOSPADM

## 2023-01-11 RX ORDER — ONDANSETRON 4 MG/1
8 TABLET, ORALLY DISINTEGRATING ORAL EVERY 6 HOURS PRN
Status: DISCONTINUED | OUTPATIENT
Start: 2023-01-11 | End: 2023-01-13 | Stop reason: HOSPADM

## 2023-01-11 RX ORDER — MORPHINE SULFATE 2 MG/ML
4 INJECTION, SOLUTION INTRAMUSCULAR; INTRAVENOUS ONCE
Status: COMPLETED | OUTPATIENT
Start: 2023-01-11 | End: 2023-01-11

## 2023-01-11 RX ORDER — ONDANSETRON 2 MG/ML
4 INJECTION INTRAMUSCULAR; INTRAVENOUS
Status: COMPLETED | OUTPATIENT
Start: 2023-01-11 | End: 2023-01-11

## 2023-01-11 RX ORDER — MORPHINE SULFATE 2 MG/ML
6 INJECTION, SOLUTION INTRAMUSCULAR; INTRAVENOUS
Status: COMPLETED | OUTPATIENT
Start: 2023-01-11 | End: 2023-01-12

## 2023-01-11 RX ORDER — HYDROMORPHONE HYDROCHLORIDE 1 MG/ML
1 INJECTION, SOLUTION INTRAMUSCULAR; INTRAVENOUS; SUBCUTANEOUS
Status: COMPLETED | OUTPATIENT
Start: 2023-01-11 | End: 2023-01-11

## 2023-01-11 RX ORDER — SODIUM CHLORIDE, SODIUM LACTATE, POTASSIUM CHLORIDE, CALCIUM CHLORIDE 600; 310; 30; 20 MG/100ML; MG/100ML; MG/100ML; MG/100ML
INJECTION, SOLUTION INTRAVENOUS CONTINUOUS
Status: DISCONTINUED | OUTPATIENT
Start: 2023-01-11 | End: 2023-01-11

## 2023-01-11 RX ORDER — SODIUM CHLORIDE 0.9 % (FLUSH) 0.9 %
10 SYRINGE (ML) INJECTION EVERY 12 HOURS PRN
Status: DISCONTINUED | OUTPATIENT
Start: 2023-01-11 | End: 2023-01-13 | Stop reason: HOSPADM

## 2023-01-11 RX ORDER — NALOXONE HCL 0.4 MG/ML
0.02 VIAL (ML) INJECTION
Status: DISCONTINUED | OUTPATIENT
Start: 2023-01-11 | End: 2023-01-13 | Stop reason: HOSPADM

## 2023-01-11 RX ADMIN — SODIUM CHLORIDE, POTASSIUM CHLORIDE, SODIUM LACTATE AND CALCIUM CHLORIDE 1000 ML: 600; 310; 30; 20 INJECTION, SOLUTION INTRAVENOUS at 07:01

## 2023-01-11 RX ADMIN — SODIUM CHLORIDE 1000 ML: 9 INJECTION, SOLUTION INTRAVENOUS at 09:01

## 2023-01-11 RX ADMIN — ONDANSETRON 4 MG: 2 INJECTION INTRAMUSCULAR; INTRAVENOUS at 07:01

## 2023-01-11 RX ADMIN — HYDROMORPHONE HYDROCHLORIDE 1 MG: 1 INJECTION, SOLUTION INTRAMUSCULAR; INTRAVENOUS; SUBCUTANEOUS at 09:01

## 2023-01-11 RX ADMIN — ENOXAPARIN SODIUM 40 MG: 40 INJECTION SUBCUTANEOUS at 11:01

## 2023-01-11 RX ADMIN — IOPAMIDOL 100 ML: 755 INJECTION, SOLUTION INTRAVENOUS at 09:01

## 2023-01-11 RX ADMIN — KETOROLAC TROMETHAMINE 15 MG: 30 INJECTION, SOLUTION INTRAMUSCULAR; INTRAVENOUS at 07:01

## 2023-01-11 RX ADMIN — MORPHINE SULFATE 4 MG: 2 INJECTION, SOLUTION INTRAMUSCULAR; INTRAVENOUS at 07:01

## 2023-01-12 LAB
ALBUMIN SERPL-MCNC: 3.7 G/DL (ref 3.5–5)
ALBUMIN/GLOB SERPL: 1.7 RATIO (ref 1.1–2)
ALP SERPL-CCNC: 48 UNIT/L (ref 40–150)
ALT SERPL-CCNC: 15 UNIT/L (ref 0–55)
AMPHET UR QL SCN: NEGATIVE
AST SERPL-CCNC: 18 UNIT/L (ref 5–34)
BARBITURATE SCN PRESENT UR: NEGATIVE
BASOPHILS # BLD AUTO: 0.03 X10(3)/MCL (ref 0–0.2)
BASOPHILS NFR BLD AUTO: 0.4 %
BENZODIAZ UR QL SCN: NEGATIVE
BILIRUBIN DIRECT+TOT PNL SERPL-MCNC: 1 MG/DL
BUN SERPL-MCNC: 10 MG/DL (ref 8.9–20.6)
CALCIUM SERPL-MCNC: 8.4 MG/DL (ref 8.4–10.2)
CANNABINOIDS UR QL SCN: NEGATIVE
CHLORIDE SERPL-SCNC: 106 MMOL/L (ref 98–107)
CO2 SERPL-SCNC: 25 MMOL/L (ref 22–29)
COCAINE UR QL SCN: NEGATIVE
CREAT SERPL-MCNC: 0.83 MG/DL (ref 0.73–1.18)
EOSINOPHIL # BLD AUTO: 0.16 X10(3)/MCL (ref 0–0.9)
EOSINOPHIL NFR BLD AUTO: 2 %
ERYTHROCYTE [DISTWIDTH] IN BLOOD BY AUTOMATED COUNT: 12.3 % (ref 11.5–17)
FENTANYL UR QL SCN: NEGATIVE
GFR SERPLBLD CREATININE-BSD FMLA CKD-EPI: >60 MLS/MIN/1.73/M2
GLOBULIN SER-MCNC: 2.2 GM/DL (ref 2.4–3.5)
GLUCOSE SERPL-MCNC: 89 MG/DL (ref 74–100)
HCT VFR BLD AUTO: 38.1 % (ref 42–52)
HGB BLD-MCNC: 12.6 GM/DL (ref 14–18)
IMM GRANULOCYTES # BLD AUTO: 0.03 X10(3)/MCL (ref 0–0.04)
IMM GRANULOCYTES NFR BLD AUTO: 0.4 %
LYMPHOCYTES # BLD AUTO: 1.36 X10(3)/MCL (ref 0.6–4.6)
LYMPHOCYTES NFR BLD AUTO: 17.2 %
MCH RBC QN AUTO: 30.8 PG
MCHC RBC AUTO-ENTMCNC: 33.1 MG/DL (ref 33–36)
MCV RBC AUTO: 93.2 FL (ref 80–94)
MDMA UR QL SCN: NEGATIVE
MONOCYTES # BLD AUTO: 0.68 X10(3)/MCL (ref 0.1–1.3)
MONOCYTES NFR BLD AUTO: 8.6 %
NEUTROPHILS # BLD AUTO: 5.65 X10(3)/MCL (ref 2.1–9.2)
NEUTROPHILS NFR BLD AUTO: 71.4 %
NRBC BLD AUTO-RTO: 0 %
OPIATES UR QL SCN: POSITIVE
PCP UR QL: NEGATIVE
PH UR: 8 [PH] (ref 3–11)
PLATELET # BLD AUTO: 184 X10(3)/MCL (ref 130–400)
PMV BLD AUTO: 11.3 FL (ref 7.4–10.4)
POTASSIUM SERPL-SCNC: 3.9 MMOL/L (ref 3.5–5.1)
PROT SERPL-MCNC: 5.9 GM/DL (ref 6.4–8.3)
RBC # BLD AUTO: 4.09 X10(6)/MCL (ref 4.7–6.1)
SODIUM SERPL-SCNC: 137 MMOL/L (ref 136–145)
SPECIFIC GRAVITY, URINE AUTO (.000) (OHS): 1.05 (ref 1–1.03)
WBC # SPEC AUTO: 7.9 X10(3)/MCL (ref 4.5–11.5)

## 2023-01-12 PROCEDURE — 63600175 PHARM REV CODE 636 W HCPCS: Performed by: FAMILY MEDICINE

## 2023-01-12 PROCEDURE — 85025 COMPLETE CBC W/AUTO DIFF WBC: CPT | Performed by: STUDENT IN AN ORGANIZED HEALTH CARE EDUCATION/TRAINING PROGRAM

## 2023-01-12 PROCEDURE — 63600175 PHARM REV CODE 636 W HCPCS: Performed by: STUDENT IN AN ORGANIZED HEALTH CARE EDUCATION/TRAINING PROGRAM

## 2023-01-12 PROCEDURE — 80053 COMPREHEN METABOLIC PANEL: CPT | Performed by: STUDENT IN AN ORGANIZED HEALTH CARE EDUCATION/TRAINING PROGRAM

## 2023-01-12 PROCEDURE — 97161 PT EVAL LOW COMPLEX 20 MIN: CPT

## 2023-01-12 PROCEDURE — 63600175 PHARM REV CODE 636 W HCPCS

## 2023-01-12 PROCEDURE — 11000001 HC ACUTE MED/SURG PRIVATE ROOM

## 2023-01-12 RX ORDER — MORPHINE SULFATE 2 MG/ML
1 INJECTION, SOLUTION INTRAMUSCULAR; INTRAVENOUS EVERY 6 HOURS PRN
Status: DISCONTINUED | OUTPATIENT
Start: 2023-01-12 | End: 2023-01-13

## 2023-01-12 RX ORDER — MORPHINE SULFATE 2 MG/ML
4 INJECTION, SOLUTION INTRAMUSCULAR; INTRAVENOUS ONCE
Status: COMPLETED | OUTPATIENT
Start: 2023-01-12 | End: 2023-01-12

## 2023-01-12 RX ORDER — SODIUM CHLORIDE, SODIUM LACTATE, POTASSIUM CHLORIDE, CALCIUM CHLORIDE 600; 310; 30; 20 MG/100ML; MG/100ML; MG/100ML; MG/100ML
INJECTION, SOLUTION INTRAVENOUS CONTINUOUS
Status: DISCONTINUED | OUTPATIENT
Start: 2023-01-12 | End: 2023-01-13

## 2023-01-12 RX ADMIN — MORPHINE SULFATE 1 MG: 2 INJECTION, SOLUTION INTRAMUSCULAR; INTRAVENOUS at 05:01

## 2023-01-12 RX ADMIN — ENOXAPARIN SODIUM 40 MG: 40 INJECTION SUBCUTANEOUS at 05:01

## 2023-01-12 RX ADMIN — MORPHINE SULFATE 4 MG: 2 INJECTION, SOLUTION INTRAMUSCULAR; INTRAVENOUS at 08:01

## 2023-01-12 RX ADMIN — SODIUM CHLORIDE, POTASSIUM CHLORIDE, SODIUM LACTATE AND CALCIUM CHLORIDE: 600; 310; 30; 20 INJECTION, SOLUTION INTRAVENOUS at 05:01

## 2023-01-12 RX ADMIN — MORPHINE SULFATE 6 MG: 2 INJECTION, SOLUTION INTRAMUSCULAR; INTRAVENOUS at 12:01

## 2023-01-12 RX ADMIN — SODIUM CHLORIDE, POTASSIUM CHLORIDE, SODIUM LACTATE AND CALCIUM CHLORIDE: 600; 310; 30; 20 INJECTION, SOLUTION INTRAVENOUS at 06:01

## 2023-01-12 NOTE — PLAN OF CARE
01/12/23 1238   Discharge Assessment   Assessment Type Discharge Planning Assessment   Confirmed/corrected address, phone number and insurance Yes   Confirmed Demographics Correct on Facesheet   Source of Information patient   Reason For Admission Epigasrtic pain   People in Home significant other   Facility Arrived From: Home   Do you expect to return to your current living situation? Yes   Do you have help at home or someone to help you manage your care at home? Yes   Who are your caregiver(s) and their phone number(s)? Donald Omalley (TERRELL)   [phone]   Prior to hospitilization cognitive status: Alert/Oriented   Current cognitive status: Alert/Oriented   Equipment Currently Used at Home none   Readmission within 30 days? No   Patient currently being followed by outpatient case management? No   Do you currently have service(s) that help you manage your care at home? No   Do you take prescription medications? No   Do you have prescription coverage? No   Do you have any problems affording any of your prescribed medications? TBD   Who is going to help you get home at discharge? Girlfriend   How do you get to doctors appointments? car, drives self   Are you on dialysis? No   Do you take coumadin? No   Discharge Plan A Home with family   DME Needed Upon Discharge  none   Discharge Plan discussed with: Patient   Discharge Barriers Identified None   Physical Activity   On average, how many days per week do you engage in moderate to strenuous exercise (like a brisk walk)? 5 days   On average, how many minutes do you engage in exercise at this level? 80 min  (Active at work with [employer])   Financial Resource Strain   How hard is it for you to pay for the very basics like food, housing, medical care, and heating? Not hard   Housing Stability   In the last 12 months, was there a time when you were not able to pay the mortgage or rent on time? N   In the last 12 months, how many places have you lived? 1   In the last 12  months, was there a time when you did not have a steady place to sleep or slept in a shelter (including now)? N   Transportation Needs   In the past 12 months, has lack of transportation kept you from medical appointments or from getting medications? no   In the past 12 months, has lack of transportation kept you from meetings, work, or from getting things needed for daily living? No   Food Insecurity   Within the past 12 months, you worried that your food would run out before you got the money to buy more. Never true   Within the past 12 months, the food you bought just didn't last and you didn't have money to get more. Never true   Stress   Do you feel stress - tense, restless, nervous, or anxious, or unable to sleep at night because your mind is troubled all the time - these days? Not at all   Social Connections   In a typical week, how many times do you talk on the phone with family, friends, or neighbors? Twice a week   How often do you get together with friends or relatives? More than 3   How often do you attend Scientologist or Church services? Never  (Non Yarsani)   Do you belong to any clubs or organizations such as Scientologist groups, unions, fraternal or athletic groups, or school groups? No   How often do you attend meetings of the clubs or organizations you belong to? Never   Are you , , , , never , or living with a partner? Living with   Alcohol Use   Q1: How often do you have a drink containing alcohol? 2-3 per wk   Q2: How many drinks containing alcohol do you have on a typical day when you are drinking? 10 or more  (Binge drinks on weekends)   Q3: How often do you have six or more drinks on one occasion? Weekly   OTHER   Name(s) of People in Home Donald Omalley (TERRELL)   [phone]     Pt resides with Donald TEJADA, and their two children (1, 11 yr old). Financial Screening notes reflect pt denied for M/D eligibility. Will follow for d/c planning needs.

## 2023-01-12 NOTE — H&P
"[facility] Medicine Wards History & Physical Note     Resident Team: [facility] Medicine List 3  Attending Physician: Dr. Riojas  Resident: Dr. Lund    Date of Admit: 2023    Chief Complaint     Abdominal Pain (Sudden onset, non-radiating epigastric pain starting approx 30 min pta, pt has hx pancreatitis in the past, states pain is the same, +nausea without vomiting, last etoh use on Monday)    Subjective:      History of Present Illness:  Osei Wellington is a 27 y.o.  male  with  history of Acute Pancreatitis presented to the ED on 2023  with a primary complaint of epigastric abdominal pain. He reports the epigastric pain started today after eating some noodles. He describes his epigastric pain as sharp and 7/10; and radiates to his left sided lower back. Associated symptoms include nausea. He reports a long alcohol history; last drink was on Monday and at that time he had 12 beers. He also reports eating [employer] daily for lunch. He denies any fever, chills, chest pain, SOB, vomiting, pain into flanks, hematuria, diarrhea, constipation      Past Medical History:  No past medical history on file.    Past Surgical History:  No past surgical history on file.    Family History:  No family history on file.    Social History:       Allergies:  Review of patient's allergies indicates:  No Known Allergies    Home Medications:  Prior to Admission medications    Not on File         Review of Systems:  See HPI         Objective:   Last 24 Hour Vital Signs:  BP  Min: 184/95  Max: 184/95  Temp  Av.6 °F (36.4 °C)  Min: 97.6 °F (36.4 °C)  Max: 97.6 °F (36.4 °C)  Pulse  Av  Min: 85  Max: 85  Resp  Av  Min: 18  Max: 22  SpO2  Av %  Min: 100 %  Max: 100 %  Height  Av' 3" (190.5 cm)  Min: 6' 3" (190.5 cm)  Max: 6' 3" (190.5 cm)  Weight  Av.6 kg (168 lb 14 oz)  Min: 76.6 kg (168 lb 14 oz)  Max: 76.6 kg (168 lb 14 oz)  Body mass index is 21.11 kg/m².  No intake/output data recorded.    Physical " Examination:  General: The patient is pleasant and cooperative and in no acute distress. Laying comfortably upright in bed.   Head: Skull is normocephalic and atraumatic.  Eyes: Pupils are equal, round, and reactive to light. Extraocular movements are intact and equal. Anicteric sclera.  Mouth: Palate is intact. Mucous membranes are moist. Posterior pharynx is free of injection, exudate, or ulcers.   Neck: Supple. Full active and full passive, range of motion in all directions.   Chest: Respirations are non-labored.  Clear to auscultation with bilateral breath sounds. There is no wheezing, retractions, rhonchi, or stridor.  Cardiovascular: Regular rate and rhythm. +2 peripheral pulses.  Abdomen: Soft, non-distended, epigastric tenderness on deep palpation, with positive bowel sounds. Negative Prosper's and Meraz's sign. No rebound/guarding.   Extremities: No clubbing, cyanosis, or edema. Moves all extremities equally and symmetrically.   Neurological: Alert and oriented. No focal neurologic deficits. Answered questions appropriately.       Laboratory:  Most Recent Data:  CBC:   Lab Results   Component Value Date    WBC 11.4 01/11/2023    HGB 14.1 01/11/2023    HCT 41.0 (L) 01/11/2023     01/11/2023    MCV 89.9 01/11/2023    RDW 12.0 01/11/2023     WBC Differential:   Recent Labs   Lab 01/11/23 1921   WBC 11.4   HGB 14.1   HCT 41.0*      MCV 89.9     BMP:   Lab Results   Component Value Date     01/11/2023    K 4.1 01/11/2023    CO2 24 01/11/2023    BUN 10.8 01/11/2023    CREATININE 0.84 01/11/2023    CALCIUM 9.5 01/11/2023    MG 2.00 01/11/2023    PHOS 2.2 (L) 04/20/2021     LFTs:   Lab Results   Component Value Date    ALBUMIN 4.6 01/11/2023    BILITOT 0.9 01/11/2023    AST 24 01/11/2023    ALKPHOS 60 01/11/2023    ALT 20 01/11/2023     Coags: No results found for: INR, PROTIME, PTT  FLP:   Lab Results   Component Value Date    CHOL 208 (H) 04/18/2021    HDL 62 (H) 04/18/2021    TRIG 157 (H)  04/18/2021     DM:   Lab Results   Component Value Date    HGBA1C 5.0 04/19/2021    CREATININE 0.84 01/11/2023     Thyroid:   Lab Results   Component Value Date    TSH 0.6212 04/19/2021      Anemia: No results found for: IRON, TIBC, FERRITIN, SATURATEDIRO  No results found for: [id number]  No results found for: FOLATE     Cardiac: No results found for: TROPONINI, CKTOTAL, CKMB, BNP  Urinalysis:   Lab Results   Component Value Date    COLORU LIGHT YELLOW 04/18/2021    PHUA 8.0 04/18/2021    NITRITE Negative 04/18/2021    KETONESU Trace (A) 04/18/2021    UROBILINOGEN Normal 04/18/2021    WBCUA 0-2 04/18/2021       Trended Lab Data:  Recent Labs   Lab 01/11/23 1921   WBC 11.4   HGB 14.1   HCT 41.0*      MCV 89.9   RDW 12.0      K 4.1   CO2 24   BUN 10.8   CREATININE 0.84   ALBUMIN 4.6   BILITOT 0.9   AST 24   ALKPHOS 60   ALT 20       Trended Cardiac Data:  No results for input(s): TROPONINI, CKTOTAL, CKMB, BNP in the last 168 hours.    Microbiology Data:  Microbiology Results (last 7 days)       ** No results found for the last 168 hours. **             Other Results:  EKG pending     Radiology:  Imaging Results              CT Abdomen Pelvis With Contrast (Final result)  Result time 01/11/23 21:28:27      Final result by Yossi Murcia MD (01/11/23 21:28:27)                   Impression:      Severe acute pancreatitis with details above.      Electronically signed by: Yossi Murcia  Date:    01/11/2023  Time:    21:28               Narrative:    EXAMINATION:  CT ABDOMEN PELVIS WITH CONTRAST    CLINICAL HISTORY:  epigastric pain, hx of pancreatitis;    TECHNIQUE:  Multidetector axial images were obtained of the abdomen and pelvis following the administration of IV contrast. Oral contrast was not administered.    Dose length product of 3 9 mGycm. Automated exposure control was utilized to minimize radiation dose.    COMPARISON:    April 18, 2021.    FINDINGS:  Included portion of the lungs are without  suspicious nodularity, acute air space infiltrates or fluid within the pleural spaces.    There is pancreatic parenchymal heterogeneity of enhancement without necrosis.  The pancreas is surrounded by considerable phlegmons and fluid  which extend predominantly into the left anterior pararenal space and also the fluid proceeds into bilateral paracolic gutters and also makes small accumulation within the pelvis.  The findings are consistent with acute pancreatitis.  Acute pancreatitis changes are more pronounced compared to the previous exam.  There is no organized fluid collection to indicate an abscess or a pseudocyst.    Hepatic volume and attenuation is unremarkable. Gallbladder wall is not thickened and there is no intra luminal calcified calculus. No biliary dilation identified.  Spleen is of normal size without focal lesion.    The adrenal glands appear within normal limits. The kidneys are unremarkable in size and contour. No solid or cystic renal lesion identified. There is no hydronephrosis. No perinephric fluid strandings or collections identified.    The stomach is mildly distended.  There are phlegmons which surround portion of the duodenum.  Small bowel is normal in caliber. The appendix appears unremarkable. There is no apparent colonic wall thickening.  Pericolonic fat is preserved. There is no evidence for bowel obstruction. There is no free air or free fluid.    Urinary bladder appears within normal limits. There is no pelvic free fluid.    No acute or otherwise osseous abnormality identified.                                         Assessment & Plan:   Acute Pancreatitis   -Amylase 2,438 and Lipase >3,000  -Long alcohol hx; last drink was on Monday (12 beers)  -Pain control with Morphine 6 mg   -Received 1L of LR in ED, currently receiving 1L of NS   -NPO   -Zofran PRN  -Alcohol level, UDS, Lipid panel, ALP pending   -PT and OT     Elevated BP   -Likely secondary to pain  -PRN Hydralazine            CODE STATUS: FULL  Access: Peripheral IV  Antibiotics: None  Diet: NPO   DVT Prophylaxis: Lovenox   GI Prophylaxis: None   Fluids: LR at 100 ml/hr      Disposition: 28 y/o male admitted to Medicine for severe Acute Pancreatitis. Pain controlled with Morphine. Currently NPO. Receiving LR at 100 ml/hr. Will continue to closely monitor.           Blanche Lund   [facility] HO-3

## 2023-01-12 NOTE — PROGRESS NOTES
Inpatient Nutrition Assessment    Admit Date: 1/11/2023   Total duration of encounter: 1 day     Nutrition Recommendation/Prescription     CL liquid diet --> ADAT to Low fat /bland   Will order boost breeze tid--250kcal; 9 gm protein per serving  MVI/fe  Biweekly wt  Will monitor nutrition status     Communication of Recommendations: reviewed with patient/caregiver and reviewed with nurse    Nutrition Assessment     Malnutrition Assessment/Nutrition-Focused Physical Exam    Malnutrition in the context of acute illness or injury  Degree of Malnutrition: does not meet criteria  Energy Intake: does not meet criteria  Interpretation of Weight Loss: >5% in 1 month  Body Fat:does not meet criteria  Area of Body Fat Loss: does not meet criteria  Muscle Mass Loss: does not meet criteria  Area of Muscle Mass Loss: does not meet criteria  Fluid Accumulation: does not meet criteria  Edema: does not meet criteria   Reduced  Strength: unable to obtain  A minimum of two characteristics is recommended for diagnosis of either severe or non-severe malnutrition.    Chart Review    Reason Seen: continuous nutrition monitoring    Malnutrition Screening Tool Results                Diagnosis:  Acute Pancreatitis, elevated BP     Relevant Medical History: pancreatitis/ ETOH use     Nutrition-Related Medications: IVF LR @ 200ml/hr   Calorie Containing IV Medications: no significant kcals from medications at this time    Nutrition-Related Labs:  (1-12) H/H 12.6/38.1(L) Gluc 89 Bun 10 Cr 0.8 K 3.9   (1-11) Amylase 2438(H) Lipase > 3000     Diet/PN Order: Diet clear liquid  Oral Supplement Order: none  Tube Feeding Order: none  Appetite/Oral Intake: poor/25-50% of meals  Factors Affecting Nutritional Intake: abdominal pain, clear liquid diet, and nausea  Food/Buddhism/Cultural Preferences: none reported  Food Allergies: none reported       Wound(s):   none reported     Comments    (1/12) Pt reported poor appetite past 2-3 days 2  "abdominal pain/nausea; usually eats well; pt has noted wt loss approx 12# over past 1-2 months; pt did admit to moderate/heavy ETOH use on weekends. Will order oral supplement to help meet nutrient needs. Abnormal labs noted: Amylase/lipase--elevated--reflective pancreatitis.     Anthropometrics    Height: 6' 3" (190.5 cm) Height Method: Stated  Last Weight: 76.6 kg (168 lb 14 oz) (23) Weight Method: Standard Scale  BMI (Calculated): 21.1  BMI Classification: normal (BMI 18.5-24.9)        Ideal Body Weight (IBW), Male: 196 lb     % Ideal Body Weight, Male (lb): 86.16 %                 Usual Body Weight (UBW), k.8 kg (approx 6% wt loss over 1-2 months)  % Usual Body Weight: 93.84     Usual Weight Provided By: patient and EMR weight history    Wt Readings from Last 5 Encounters:   23 76.6 kg (168 lb 14 oz)     Weight Change(s) Since Admission:  Admit Weight: 76.6 kg (168 lb 14 oz) (23)  Approx 6% wt loss over 1-2 months     Estimated Needs    Weight Used For Calorie Calculations: 76.6 kg (168 lb 14 oz)  Energy Calorie Requirements (kcal): 2298 kcal/d; 30 magaly/kg  Energy Need Method: Kcal/kg  Weight Used For Protein Calculations: 76.6 kg (168 lb 14 oz)  Protein Requirements: 100 gm protein/d; 1.3 gm/kg  Fluid Requirements (mL): 2298 ml/d; 1ml/magaly  Temp: 98.2 °F (36.8 °C)       Enteral Nutrition    Patient not receiving enteral nutrition at this time.    Parenteral Nutrition    Patient not receiving parenteral nutrition support at this time.    Evaluation of Received Nutrient Intake    Calories: not meeting estimated needs  Protein: not meeting estimated needs    Patient Education    Education Provided: low fat diet  Teaching Method: explanation and printed materials  Comprehension: verbalizes understanding  Barriers to Learning: acuity of illness  Expected Compliance: fair  Comments: All questions were answered and dietitian's contact information was provided.     Nutrition Diagnosis "     PES: Inadequate energy intake related to pancreatitis/etoh use  as evidenced by 12# wt loss past 1-2 months; decreased po intake 2-3 days . (new)    Interventions/Goals     Intervention(s): modified composition of meals/snacks, commercial beverage, multivitamin/mineral supplement therapy, purpose of nutrition education, and collaboration with other providers  Goal: Meet greater than 75% of nutritional needs by follow-up. (new)    Monitoring & Evaluation     Dietitian will monitor food and beverage intake and weight.  Nutrition Risk/Follow-Up: moderate (follow-up in 3-5 days)   Please consult if re-assessment needed sooner.

## 2023-01-12 NOTE — PT/OT/SLP EVAL
Physical Therapy Evaluation and Discharge Note    Patient Name:  Osei Wellington   MRN:  [id number]    Recommendations:     Discharge Recommendations:  home  Discharge Equipment Recommendations:   none  Barriers to discharge:  severe pain    Assessment:     Osei Wellington is a 27 y.o. male admitted with a medical diagnosis of   1. Epigastric pain    2. Chronic pancreatitis, unspecified pancreatitis type    3. Acute pancreatitis, unspecified complication status, unspecified pancreatitis type      .  At this time, patient is functioning at their prior level of function and does not require further acute PT services.     Recent Surgery: * No surgery found *      Plan:     During this hospitalization, patient does not require further acute PT services.  Please re-consult if situation changes.      Subjective     Chief Complaint: severe abdominal pain  Patient/Family Comments/goals: pain relief  Pain/Comfort: 8/10    Patients cultural, spiritual, Temple conflicts given the current situation:  no    Living Environment:  Pt. Lives with his girlfriend in a house, he drives and works.  Prior to admission, patients level of function was completely independent.  Equipment used at home:  .  DME owned (not currently used): none.  Upon discharge, patient will have assistance from his girlfriend.    Objective:     Communicated with nurse De Paz prior to session.  Patient found supine with   upon PT entry to room.    General Precautions: Standard,      Orthopedic Precautions:    Braces:    Respiratory Status: Room air    Exams:  Cognitive Exam:  Patient is oriented to Person, Place, Time, and Situation  Fine Motor Coordination:    -       Intact  Sensation:    -       Intact  RUE ROM: WNL  RUE Strength: WNL  LUE ROM: WNL  LUE Strength: WNL  RLE ROM: WNL  RLE Strength: WNL  LLE ROM: WNL  LLE Strength: WNL    Functional Mobility:  Bed Mobility:     Supine to Sit: independence  Transfers:     Sit to Stand:  independence with no  AD  Gait: pt. Is flexed in trunk, severe pain in abdomen, otherwise normal gait.    Patient left HOB elevated with all lines intact, call button in reach, and nurse notified.    GOALS:   Multidisciplinary Problems       Physical Therapy Goals       Not on file                    History:     No past medical history on file.    No past surgical history on file.    Time Tracking:     PT Received On:  1/12/2023    PT Start Time:    07:38   PT Stop Time:    08:05  PT Total Time (min):   27    Billable Minutes: Evaluation 27 01/12/2023

## 2023-01-12 NOTE — ED PROVIDER NOTES
Encounter Date: 1/11/2023       History     Chief Complaint   Patient presents with    Abdominal Pain     Sudden onset, non-radiating epigastric pain starting approx 30 min pta, pt has hx pancreatitis in the past, states pain is the same, +nausea without vomiting, last etoh use on Monday     27-year-old male presents emergency department with sudden nonradiating epigastric pain (10/10), nausea that started about 30 minutes prior to arrival.  He states he had the same symptoms last time he had pancreatitis.  He does admit to heavy alcohol consumption in the past however only drinks on weekends now.   His last etoh was Monday.  He denies SOB, vomiting, fever, chills.     The history is provided by the patient. No  was used.   Abdominal Pain  Illness onset: 30 minutes prior to arrival. The abdominal pain is located in the epigastric region. The abdominal pain does not radiate. The severity of the abdominal pain is 10/10. The other symptoms of the illness include nausea. The other symptoms of the illness do not include fever, shortness of breath, vomiting or dysuria.   The illness is associated with alcohol use. Symptoms associated with the illness do not include chills, hematuria, frequency or back pain.   Review of patient's allergies indicates:  No Known Allergies  No past medical history on file.  No past surgical history on file.  No family history on file.     Review of Systems   Constitutional:  Negative for chills and fever.   Respiratory:  Negative for cough and shortness of breath.    Cardiovascular:  Negative for chest pain and palpitations.   Gastrointestinal:  Positive for abdominal pain (epigastric) and nausea. Negative for vomiting.   Genitourinary:  Negative for dysuria, flank pain, frequency and hematuria.   Musculoskeletal:  Negative for back pain and neck pain.   Skin:  Negative for rash.   Neurological:  Negative for dizziness, weakness, light-headedness and headaches.    Hematological:  Negative for adenopathy. Does not bruise/bleed easily.     Physical Exam     Initial Vitals [01/11/23 1838]   BP Pulse Resp Temp SpO2   (!) 184/95 85 (!) 22 97.6 °F (36.4 °C) 100 %      MAP       --         Physical Exam    Nursing note and vitals reviewed.  Constitutional: He appears well-developed and well-nourished.   HENT:   Nose: Nose normal.   Mouth/Throat: Oropharynx is clear and moist.   Eyes: Conjunctivae are normal.   Neck: Neck supple.   Normal range of motion.  Cardiovascular:  Normal rate, normal heart sounds and intact distal pulses.           Pulmonary/Chest: Breath sounds normal.   Abdominal: Abdomen is soft. Bowel sounds are normal. There is abdominal tenderness (epigastric).   Musculoskeletal:         General: Normal range of motion.      Cervical back: Normal range of motion and neck supple.     Lymphadenopathy:     He has no cervical adenopathy.   Neurological: He is alert. GCS score is 15. GCS eye subscore is 4. GCS verbal subscore is 5. GCS motor subscore is 6.   Skin: Skin is warm. Capillary refill takes less than 2 seconds.       ED Course   Procedures  Labs Reviewed   COMPREHENSIVE METABOLIC PANEL - Abnormal; Notable for the following components:       Result Value    Glucose Level 110 (*)     All other components within normal limits   LIPASE - Abnormal; Notable for the following components:    Lipase Level >3,000 (*)     All other components within normal limits   AMYLASE - Abnormal; Notable for the following components:    Amylase Level 2,438 (*)     All other components within normal limits   CBC WITH DIFFERENTIAL - Abnormal; Notable for the following components:    RBC 4.56 (*)     Hct 41.0 (*)     MPV 11.6 (*)     IG# 0.05 (*)     All other components within normal limits   MAGNESIUM - Normal   CBC W/ AUTO DIFFERENTIAL    Narrative:     The following orders were created for panel order CBC Auto Differential.  Procedure                               Abnormality          Status                     ---------                               -----------         ------                     CBC with Differential[[id number]]        Abnormal            Final result                 Please view results for these tests on the individual orders.   EXTRA TUBES    Narrative:     The following orders were created for panel order EXTRA TUBES.  Procedure                               Abnormality         Status                     ---------                               -----------         ------                     Light Blue Top Hold[[id number]]                              In process                 Red Top Hold[[id number]]                                     In process                 Gold Top Hold[[id number]]                                                                 Please view results for these tests on the individual orders.   LIGHT BLUE TOP HOLD   RED TOP HOLD   GOLD TOP HOLD          Imaging Results              CT Abdomen Pelvis With Contrast (In process)  Result time 01/11/23 21:22:04                     Medications   HYDROmorphone injection 1 mg (has no administration in time range)   ondansetron injection 4 mg (4 mg Intravenous Given 1/11/23 1949)   ketorolac injection 15 mg (15 mg Intravenous Given 1/11/23 1948)   morphine injection 4 mg (4 mg Intravenous Given 1/11/23 1948)   lactated ringers bolus 1,000 mL (0 mLs Intravenous Stopped 1/11/23 2047)   ondansetron 4 mg/2 mL injection (4 mg  Given 1/11/23 1949)   iopamidoL (ISOVUE-370) injection 100 mL (100 mLs Intravenous Given 1/11/23 2101)     Medical Decision Making:   Initial Assessment:   Epigastric pain with hx of pancreatis.  Will get labs and then imaging if needed.    Clinical Tests:   Lab Tests: Ordered and Reviewed  Radiological Study: Ordered and Reviewed  ED Management:  Amylase: 2,438  Lipase > 3,000  Waiting for CT abdomen pelvis to be read by radiologist.    Likely Pancreatitis             ED Course as of 01/11/23 2126    Wed Jan 11, 2023 2015 Amylase(!): 2,438 [ER]   2031 Lipase(!): >3,000 [ER]   2121 I transitioned this patient to Dr. Wong at this time.  Pending CT for admit for likely pancreatitis.   [ER]      ED Course User Index  [ER] ELISHA Bartholomew                 Clinical Impression:   Final diagnoses:  [R10.13] Epigastric pain (Primary)               ELISHA Bartholomew  01/11/23 2126

## 2023-01-13 VITALS
SYSTOLIC BLOOD PRESSURE: 158 MMHG | TEMPERATURE: 98 F | WEIGHT: 168.88 LBS | DIASTOLIC BLOOD PRESSURE: 96 MMHG | OXYGEN SATURATION: 100 % | RESPIRATION RATE: 20 BRPM | HEIGHT: 75 IN | HEART RATE: 68 BPM | BODY MASS INDEX: 21 KG/M2

## 2023-01-13 PROBLEM — R10.13 EPIGASTRIC PAIN: Status: ACTIVE | Noted: 2023-01-13

## 2023-01-13 PROBLEM — K85.90 ACUTE PANCREATITIS: Status: ACTIVE | Noted: 2023-01-13

## 2023-01-13 LAB
ALBUMIN SERPL-MCNC: 3.4 G/DL (ref 3.5–5)
ALBUMIN/GLOB SERPL: 1.5 RATIO (ref 1.1–2)
ALP SERPL-CCNC: 46 UNIT/L (ref 40–150)
ALT SERPL-CCNC: 10 UNIT/L (ref 0–55)
AST SERPL-CCNC: 16 UNIT/L (ref 5–34)
BASOPHILS # BLD AUTO: 0.03 X10(3)/MCL (ref 0–0.2)
BASOPHILS NFR BLD AUTO: 0.4 %
BILIRUBIN DIRECT+TOT PNL SERPL-MCNC: 0.9 MG/DL
BUN SERPL-MCNC: 5 MG/DL (ref 8.9–20.6)
CALCIUM SERPL-MCNC: 8.5 MG/DL (ref 8.4–10.2)
CHLORIDE SERPL-SCNC: 106 MMOL/L (ref 98–107)
CO2 SERPL-SCNC: 28 MMOL/L (ref 22–29)
CREAT SERPL-MCNC: 0.71 MG/DL (ref 0.73–1.18)
EOSINOPHIL # BLD AUTO: 0.22 X10(3)/MCL (ref 0–0.9)
EOSINOPHIL NFR BLD AUTO: 3.3 %
ERYTHROCYTE [DISTWIDTH] IN BLOOD BY AUTOMATED COUNT: 12.1 % (ref 11.5–17)
GFR SERPLBLD CREATININE-BSD FMLA CKD-EPI: >60 MLS/MIN/1.73/M2
GLOBULIN SER-MCNC: 2.3 GM/DL (ref 2.4–3.5)
GLUCOSE SERPL-MCNC: 84 MG/DL (ref 74–100)
HCT VFR BLD AUTO: 36.9 % (ref 42–52)
HGB BLD-MCNC: 12.2 GM/DL (ref 14–18)
IMM GRANULOCYTES # BLD AUTO: 0.02 X10(3)/MCL (ref 0–0.04)
IMM GRANULOCYTES NFR BLD AUTO: 0.3 %
LYMPHOCYTES # BLD AUTO: 1.01 X10(3)/MCL (ref 0.6–4.6)
LYMPHOCYTES NFR BLD AUTO: 15.1 %
MCH RBC QN AUTO: 30.8 PG
MCHC RBC AUTO-ENTMCNC: 33.1 MG/DL (ref 33–36)
MCV RBC AUTO: 93.2 FL (ref 80–94)
MONOCYTES # BLD AUTO: 0.76 X10(3)/MCL (ref 0.1–1.3)
MONOCYTES NFR BLD AUTO: 11.4 %
NEUTROPHILS # BLD AUTO: 4.64 X10(3)/MCL (ref 2.1–9.2)
NEUTROPHILS NFR BLD AUTO: 69.5 %
NRBC BLD AUTO-RTO: 0 %
PLATELET # BLD AUTO: 178 X10(3)/MCL (ref 130–400)
PMV BLD AUTO: 12 FL (ref 7.4–10.4)
POTASSIUM SERPL-SCNC: 4.3 MMOL/L (ref 3.5–5.1)
PROT SERPL-MCNC: 5.7 GM/DL (ref 6.4–8.3)
RBC # BLD AUTO: 3.96 X10(6)/MCL (ref 4.7–6.1)
SODIUM SERPL-SCNC: 140 MMOL/L (ref 136–145)
WBC # SPEC AUTO: 6.7 X10(3)/MCL (ref 4.5–11.5)

## 2023-01-13 PROCEDURE — 80053 COMPREHEN METABOLIC PANEL: CPT | Performed by: STUDENT IN AN ORGANIZED HEALTH CARE EDUCATION/TRAINING PROGRAM

## 2023-01-13 PROCEDURE — 63600175 PHARM REV CODE 636 W HCPCS

## 2023-01-13 PROCEDURE — 36415 COLL VENOUS BLD VENIPUNCTURE: CPT | Performed by: STUDENT IN AN ORGANIZED HEALTH CARE EDUCATION/TRAINING PROGRAM

## 2023-01-13 PROCEDURE — 63600175 PHARM REV CODE 636 W HCPCS: Performed by: STUDENT IN AN ORGANIZED HEALTH CARE EDUCATION/TRAINING PROGRAM

## 2023-01-13 PROCEDURE — 85025 COMPLETE CBC W/AUTO DIFF WBC: CPT | Performed by: STUDENT IN AN ORGANIZED HEALTH CARE EDUCATION/TRAINING PROGRAM

## 2023-01-13 RX ADMIN — SODIUM CHLORIDE, POTASSIUM CHLORIDE, SODIUM LACTATE AND CALCIUM CHLORIDE: 600; 310; 30; 20 INJECTION, SOLUTION INTRAVENOUS at 04:01

## 2023-01-13 RX ADMIN — MORPHINE SULFATE 1 MG: 2 INJECTION, SOLUTION INTRAMUSCULAR; INTRAVENOUS at 04:01

## 2023-01-13 NOTE — NURSING
Received from ER per  with diagnosis of acute pancreatitis. He is currently on a full liquid diet, no abdominal pain at this time. IV to the right FA with LR@ 200\cc/hr.

## 2023-01-13 NOTE — NURSING
Patient in stable condition and being discharged to home for self care with family. Discharge instructions and patient education given. No questions or concerns verbalized.

## 2023-01-13 NOTE — DISCHARGE SUMMARY
"[facility] Discharge Summary    Admitting Physician: Jonny Riojas MD  Attending Physician: Jonny Riojas MD  Date of Admit: 1/11/2023  Date of Discharge: 1/13/2023    Condition: Good  Outcome: Patient tolerated treatment/procedure well without complication and is now ready for discharge.  DISPOSITION: Home or Self Care        Discharge Diagnoses:     Patient Active Problem List   Diagnosis    Epigastric pain    Acute pancreatitis       Principal Problem:  Acute pancreatitis    Consultants and Procedures:     Consultants:  IP CONSULT TO HOSPITAL MEDICINE    Procedures:   * No surgery found *      Brief Admission History:      Osei Wellington is a 27 y.o.  male  with  history of Acute Pancreatitis presented to the ED on 1/11/2023  with a primary complaint of epigastric abdominal pain. He reports the epigastric pain started today after eating some noodles. He describes his epigastric pain as sharp and 7/10; and radiates to his left sided lower back. Associated symptoms include nausea. He reports a long alcohol history; last drink was on Monday and at that time he had 12 beers. He also reports eating [employer] daily for lunch. He denies any fever, chills, chest pain, SOB, vomiting, pain into flanks, hematuria, diarrhea, constipation. Found to have pancreatitis aand started on fluids, kept NPO.    Hospital Course with Pertinent Findings:      Pain and nausea improved with 24 hours of IVF and diet resumed and well tolerated. Pt stable for DC with PW f/u and addiction med f/u for ETOH use cessation.    Discharge physical exam:  Vitals  BP: (!) 158/96  Temp: 97.7 °F (36.5 °C)  Temp src: Oral  Pulse: 68  Resp: 20  SpO2: 100 %  Height: 6' 3" (190.5 cm)  Weight: 76.6 kg (168 lb 14 oz)    General: The patient is pleasant and cooperative and in no acute distress. Laying comfortably upright in bed.   Head: Skull is normocephalic and atraumatic.  Eyes: Pupils are equal, round, and reactive to light. " Extraocular movements are intact and equal. Anicteric sclera.  Mouth: Palate is intact. Mucous membranes are moist. Posterior pharynx is free of injection, exudate, or ulcers.   Neck: Supple. Full active and full passive, range of motion in all directions.   Chest: Respirations are non-labored.  Clear to auscultation with bilateral breath sounds. There is no wheezing, retractions, rhonchi, or stridor.  Cardiovascular: Regular rate and rhythm. +2 peripheral pulses.  Abdomen: Soft, non-distended, NT. Negative [place]'s and Meraz's sign. No rebound/guarding.   Extremities: No clubbing, cyanosis, or edema. Moves all extremities equally and symmetrically.   Neurological: Alert and oriented. No focal neurologic deficits. Answered questions appropriately.        TIME SPENT ON DISCHARGE: 35 minutes    Discharge Medications:         Medication List      You have not been prescribed any medications.         Discharge Instructions:         Osei Wellington is being discharged Home or Self Care.    Discharge Procedure Orders   Ambulatory referral/consult to Internal Medicine   Standing Status: Future   Referral Priority: Routine Referral Type: Consultation   Referral Reason: Specialty Services Required Referral Location: [facility]   Requested Specialty: Internal Medicine   Number of Visits Requested: 1     Ambulatory referral/consult to Addiction Psychiatry   Standing Status: Future   Referral Priority: Routine Referral Type: Psychiatric   Referral Reason: Specialty Services Required   Referred to Provider: JANIA ALVES Requested Specialty: Addiction Medicine   Number of Visits Requested: 1     Diet Adult Regular     Notify your health care provider if you experience any of the following:  persistent nausea and vomiting or diarrhea     Notify your health care provider if you experience any of the following:  severe uncontrolled pain     Activity as tolerated        Follow-Up Appointments:   Follow-up  Information       Alessandro Singh MD .    Specialties: Psychiatry, Addiction Medicine  Contact information:  [address]  [facility] [address]  [phone]                               To address at follow-up:  -The following labs are to be drawn at the [facility] visit: none  -The following imaging studies are to be ordered at the [facility] visit: none    At this time, Osei Wellington is determined to have maximized benefits of IP hospitalization. he is discharged in stable condition with OP f/u recommendations and instructions. All questions answered, and patient verbalized agreement with the POC. They were given return precautions prior to d/c including symptoms that should prompt return to ED or to call PCP. Total time spent of DC of 35 minutes.       Will discuss with Jonny Riojas MD. This note is not complete until cosigned by attending physician.  Amrit Calderon MD - PGY2  [facility]

## 2023-01-18 ENCOUNTER — PATIENT OUTREACH (OUTPATIENT)
Dept: ADMINISTRATIVE | Facility: CLINIC | Age: 28
End: 2023-01-18

## 2023-01-18 NOTE — PROGRESS NOTES
C3 nurse attempted to contact Osei Wellington for a TCC post hospital discharge follow up call. No answer. The patient does not have a scheduled HOSFU appointment, and the pt does not have an [facility] PCP.

## 2023-01-19 NOTE — PROGRESS NOTES
C3 nurse spoke with Osei Wellington for a TCC post hospital discharge follow up call. The patient does not have a PCP.  Waiting on medicaid application approval.  Advised he could also contact the [facility] family medicine clinic to establish PCP.  Patient voiced understanding.

## 2023-12-03 ENCOUNTER — HOSPITAL ENCOUNTER (INPATIENT)
Facility: HOSPITAL | Age: 28
LOS: 2 days | Discharge: HOME | DRG: 440 | End: 2023-12-05
Attending: EMERGENCY MEDICINE | Admitting: STUDENT IN AN ORGANIZED HEALTH CARE EDUCATION/TRAINING PROGRAM

## 2023-12-03 DIAGNOSIS — K85.20 ALCOHOL-INDUCED ACUTE PANCREATITIS, UNSPECIFIED COMPLICATION STATUS: Primary | ICD-10-CM

## 2023-12-03 DIAGNOSIS — R07.9 CHEST PAIN: ICD-10-CM

## 2023-12-03 LAB
ALBUMIN SERPL-MCNC: 4 G/DL (ref 3.5–5)
ALBUMIN SERPL-MCNC: 4.2 G/DL (ref 3.5–5)
ALBUMIN/GLOB SERPL: 1.4 RATIO (ref 1.1–2)
ALBUMIN/GLOB SERPL: 1.4 RATIO (ref 1.1–2)
ALP SERPL-CCNC: 65 UNIT/L (ref 40–150)
ALP SERPL-CCNC: 66 UNIT/L (ref 40–150)
ALT SERPL-CCNC: 20 UNIT/L (ref 0–55)
ALT SERPL-CCNC: 21 UNIT/L (ref 0–55)
AST SERPL-CCNC: 26 UNIT/L (ref 5–34)
AST SERPL-CCNC: 27 UNIT/L (ref 5–34)
BASOPHILS # BLD AUTO: 0.03 X10(3)/MCL
BASOPHILS # BLD AUTO: 0.03 X10(3)/MCL
BASOPHILS NFR BLD AUTO: 0.2 %
BASOPHILS NFR BLD AUTO: 0.3 %
BILIRUB SERPL-MCNC: 1 MG/DL
BILIRUB SERPL-MCNC: 1.2 MG/DL
BUN SERPL-MCNC: 8.9 MG/DL (ref 8.9–20.6)
BUN SERPL-MCNC: 9.9 MG/DL (ref 8.9–20.6)
CALCIUM SERPL-MCNC: 8.7 MG/DL (ref 8.4–10.2)
CALCIUM SERPL-MCNC: 9 MG/DL (ref 8.4–10.2)
CHLORIDE SERPL-SCNC: 104 MMOL/L (ref 98–107)
CHLORIDE SERPL-SCNC: 104 MMOL/L (ref 98–107)
CO2 SERPL-SCNC: 24 MMOL/L (ref 22–29)
CO2 SERPL-SCNC: 24 MMOL/L (ref 22–29)
CREAT SERPL-MCNC: 0.86 MG/DL (ref 0.73–1.18)
CREAT SERPL-MCNC: 0.86 MG/DL (ref 0.73–1.18)
EOSINOPHIL # BLD AUTO: 0.01 X10(3)/MCL (ref 0–0.9)
EOSINOPHIL # BLD AUTO: 0.1 X10(3)/MCL (ref 0–0.9)
EOSINOPHIL NFR BLD AUTO: 0.1 %
EOSINOPHIL NFR BLD AUTO: 0.8 %
ERYTHROCYTE [DISTWIDTH] IN BLOOD BY AUTOMATED COUNT: 12.2 % (ref 11.5–17)
ERYTHROCYTE [DISTWIDTH] IN BLOOD BY AUTOMATED COUNT: 12.2 % (ref 11.5–17)
ETHANOL SERPL-MCNC: <10 MG/DL
GFR SERPLBLD CREATININE-BSD FMLA CKD-EPI: >60 MLS/MIN/1.73/M2
GFR SERPLBLD CREATININE-BSD FMLA CKD-EPI: >60 MLS/MIN/1.73/M2
GLOBULIN SER-MCNC: 2.8 GM/DL (ref 2.4–3.5)
GLOBULIN SER-MCNC: 3 GM/DL (ref 2.4–3.5)
GLUCOSE SERPL-MCNC: 122 MG/DL (ref 74–100)
GLUCOSE SERPL-MCNC: 127 MG/DL (ref 74–100)
HCT VFR BLD AUTO: 46.5 % (ref 42–52)
HCT VFR BLD AUTO: 47.3 % (ref 42–52)
HGB BLD-MCNC: 15.6 G/DL (ref 14–18)
HGB BLD-MCNC: 16 G/DL (ref 14–18)
HOLD SPECIMEN: NORMAL
IMM GRANULOCYTES # BLD AUTO: 0.04 X10(3)/MCL (ref 0–0.04)
IMM GRANULOCYTES # BLD AUTO: 0.04 X10(3)/MCL (ref 0–0.04)
IMM GRANULOCYTES NFR BLD AUTO: 0.3 %
IMM GRANULOCYTES NFR BLD AUTO: 0.3 %
LACTATE SERPL-SCNC: 1.2 MMOL/L (ref 0.5–2.2)
LIPASE SERPL-CCNC: 1849 U/L
LYMPHOCYTES # BLD AUTO: 0.33 X10(3)/MCL (ref 0.6–4.6)
LYMPHOCYTES # BLD AUTO: 0.65 X10(3)/MCL (ref 0.6–4.6)
LYMPHOCYTES NFR BLD AUTO: 2.8 %
LYMPHOCYTES NFR BLD AUTO: 5.1 %
MAGNESIUM SERPL-MCNC: 1.9 MG/DL (ref 1.6–2.6)
MCH RBC QN AUTO: 30.7 PG (ref 27–31)
MCH RBC QN AUTO: 30.9 PG (ref 27–31)
MCHC RBC AUTO-ENTMCNC: 33.5 G/DL (ref 33–36)
MCHC RBC AUTO-ENTMCNC: 33.8 G/DL (ref 33–36)
MCV RBC AUTO: 90.6 FL (ref 80–94)
MCV RBC AUTO: 92.1 FL (ref 80–94)
MONOCYTES # BLD AUTO: 0.61 X10(3)/MCL (ref 0.1–1.3)
MONOCYTES # BLD AUTO: 0.61 X10(3)/MCL (ref 0.1–1.3)
MONOCYTES NFR BLD AUTO: 4.8 %
MONOCYTES NFR BLD AUTO: 5.2 %
NEUTROPHILS # BLD AUTO: 10.62 X10(3)/MCL (ref 2.1–9.2)
NEUTROPHILS # BLD AUTO: 11.21 X10(3)/MCL (ref 2.1–9.2)
NEUTROPHILS NFR BLD AUTO: 88.8 %
NEUTROPHILS NFR BLD AUTO: 91.3 %
NRBC BLD AUTO-RTO: 0 %
NRBC BLD AUTO-RTO: 0 %
PHOSPHATE SERPL-MCNC: 2.9 MG/DL (ref 2.3–4.7)
PLATELET # BLD AUTO: 209 X10(3)/MCL (ref 130–400)
PLATELET # BLD AUTO: 246 X10(3)/MCL (ref 130–400)
PMV BLD AUTO: 10.9 FL (ref 7.4–10.4)
PMV BLD AUTO: 11 FL (ref 7.4–10.4)
POTASSIUM SERPL-SCNC: 3.8 MMOL/L (ref 3.5–5.1)
POTASSIUM SERPL-SCNC: 5 MMOL/L (ref 3.5–5.1)
PROT SERPL-MCNC: 6.8 GM/DL (ref 6.4–8.3)
PROT SERPL-MCNC: 7.2 GM/DL (ref 6.4–8.3)
RBC # BLD AUTO: 5.05 X10(6)/MCL (ref 4.7–6.1)
RBC # BLD AUTO: 5.22 X10(6)/MCL (ref 4.7–6.1)
SODIUM SERPL-SCNC: 137 MMOL/L (ref 136–145)
SODIUM SERPL-SCNC: 139 MMOL/L (ref 136–145)
WBC # SPEC AUTO: 11.64 X10(3)/MCL (ref 4.5–11.5)
WBC # SPEC AUTO: 12.64 X10(3)/MCL (ref 4.5–11.5)

## 2023-12-03 PROCEDURE — 87040 BLOOD CULTURE FOR BACTERIA: CPT | Performed by: EMERGENCY MEDICINE

## 2023-12-03 PROCEDURE — 63600175 PHARM REV CODE 636 W HCPCS

## 2023-12-03 PROCEDURE — 82077 ASSAY SPEC XCP UR&BREATH IA: CPT | Performed by: EMERGENCY MEDICINE

## 2023-12-03 PROCEDURE — 93005 ELECTROCARDIOGRAM TRACING: CPT

## 2023-12-03 PROCEDURE — 96375 TX/PRO/DX INJ NEW DRUG ADDON: CPT

## 2023-12-03 PROCEDURE — 96361 HYDRATE IV INFUSION ADD-ON: CPT

## 2023-12-03 PROCEDURE — 85025 COMPLETE CBC W/AUTO DIFF WBC: CPT

## 2023-12-03 PROCEDURE — 83690 ASSAY OF LIPASE: CPT | Performed by: EMERGENCY MEDICINE

## 2023-12-03 PROCEDURE — 25000003 PHARM REV CODE 250

## 2023-12-03 PROCEDURE — 84100 ASSAY OF PHOSPHORUS: CPT

## 2023-12-03 PROCEDURE — 83735 ASSAY OF MAGNESIUM: CPT | Performed by: EMERGENCY MEDICINE

## 2023-12-03 PROCEDURE — 80053 COMPREHEN METABOLIC PANEL: CPT | Performed by: EMERGENCY MEDICINE

## 2023-12-03 PROCEDURE — 25500020 PHARM REV CODE 255

## 2023-12-03 PROCEDURE — 25000003 PHARM REV CODE 250: Performed by: EMERGENCY MEDICINE

## 2023-12-03 PROCEDURE — 85025 COMPLETE CBC W/AUTO DIFF WBC: CPT | Performed by: EMERGENCY MEDICINE

## 2023-12-03 PROCEDURE — 83605 ASSAY OF LACTIC ACID: CPT | Performed by: EMERGENCY MEDICINE

## 2023-12-03 PROCEDURE — 63600175 PHARM REV CODE 636 W HCPCS: Performed by: EMERGENCY MEDICINE

## 2023-12-03 PROCEDURE — 99285 EMERGENCY DEPT VISIT HI MDM: CPT | Mod: 25

## 2023-12-03 PROCEDURE — 21400001 HC TELEMETRY ROOM

## 2023-12-03 PROCEDURE — 96374 THER/PROPH/DIAG INJ IV PUSH: CPT

## 2023-12-03 PROCEDURE — 80053 COMPREHEN METABOLIC PANEL: CPT

## 2023-12-03 RX ORDER — ONDANSETRON 2 MG/ML
4 INJECTION INTRAMUSCULAR; INTRAVENOUS EVERY 6 HOURS PRN
Status: DISCONTINUED | OUTPATIENT
Start: 2023-12-03 | End: 2023-12-05 | Stop reason: HOSPADM

## 2023-12-03 RX ORDER — KETOROLAC TROMETHAMINE 30 MG/ML
15 INJECTION, SOLUTION INTRAMUSCULAR; INTRAVENOUS
Status: DISCONTINUED | OUTPATIENT
Start: 2023-12-03 | End: 2023-12-03

## 2023-12-03 RX ORDER — LABETALOL HCL 20 MG/4 ML
10 SYRINGE (ML) INTRAVENOUS EVERY 4 HOURS PRN
Status: DISCONTINUED | OUTPATIENT
Start: 2023-12-03 | End: 2023-12-05 | Stop reason: HOSPADM

## 2023-12-03 RX ORDER — LORAZEPAM 2 MG/ML
1 INJECTION INTRAMUSCULAR
Status: DISCONTINUED | OUTPATIENT
Start: 2023-12-03 | End: 2023-12-05 | Stop reason: HOSPADM

## 2023-12-03 RX ORDER — HYDRALAZINE HYDROCHLORIDE 20 MG/ML
10 INJECTION INTRAMUSCULAR; INTRAVENOUS EVERY 8 HOURS PRN
Status: DISCONTINUED | OUTPATIENT
Start: 2023-12-03 | End: 2023-12-05 | Stop reason: HOSPADM

## 2023-12-03 RX ORDER — IBUPROFEN 200 MG
24 TABLET ORAL
Status: DISCONTINUED | OUTPATIENT
Start: 2023-12-03 | End: 2023-12-05 | Stop reason: HOSPADM

## 2023-12-03 RX ORDER — HYDROMORPHONE HYDROCHLORIDE 1 MG/ML
1 INJECTION, SOLUTION INTRAMUSCULAR; INTRAVENOUS; SUBCUTANEOUS
Status: COMPLETED | OUTPATIENT
Start: 2023-12-03 | End: 2023-12-03

## 2023-12-03 RX ORDER — SODIUM CHLORIDE, SODIUM LACTATE, POTASSIUM CHLORIDE, CALCIUM CHLORIDE 600; 310; 30; 20 MG/100ML; MG/100ML; MG/100ML; MG/100ML
INJECTION, SOLUTION INTRAVENOUS CONTINUOUS
Status: DISCONTINUED | OUTPATIENT
Start: 2023-12-03 | End: 2023-12-05 | Stop reason: HOSPADM

## 2023-12-03 RX ORDER — GLUCAGON 1 MG
1 KIT INJECTION
Status: DISCONTINUED | OUTPATIENT
Start: 2023-12-03 | End: 2023-12-05 | Stop reason: HOSPADM

## 2023-12-03 RX ORDER — ENOXAPARIN SODIUM 100 MG/ML
40 INJECTION SUBCUTANEOUS EVERY 24 HOURS
Status: DISCONTINUED | OUTPATIENT
Start: 2023-12-03 | End: 2023-12-05 | Stop reason: HOSPADM

## 2023-12-03 RX ORDER — ONDANSETRON 2 MG/ML
4 INJECTION INTRAMUSCULAR; INTRAVENOUS
Status: COMPLETED | OUTPATIENT
Start: 2023-12-03 | End: 2023-12-03

## 2023-12-03 RX ORDER — MAG HYDROX/ALUMINUM HYD/SIMETH 200-200-20
15 SUSPENSION, ORAL (FINAL DOSE FORM) ORAL
Status: COMPLETED | OUTPATIENT
Start: 2023-12-03 | End: 2023-12-03

## 2023-12-03 RX ORDER — HYDROMORPHONE HYDROCHLORIDE 1 MG/ML
1 INJECTION, SOLUTION INTRAMUSCULAR; INTRAVENOUS; SUBCUTANEOUS EVERY 4 HOURS PRN
Status: DISCONTINUED | OUTPATIENT
Start: 2023-12-03 | End: 2023-12-05 | Stop reason: HOSPADM

## 2023-12-03 RX ORDER — THIAMINE HCL 100 MG
100 TABLET ORAL EVERY 8 HOURS
Status: DISCONTINUED | OUTPATIENT
Start: 2023-12-05 | End: 2023-12-04

## 2023-12-03 RX ORDER — NALOXONE HCL 0.4 MG/ML
0.02 VIAL (ML) INJECTION
Status: DISCONTINUED | OUTPATIENT
Start: 2023-12-03 | End: 2023-12-05 | Stop reason: HOSPADM

## 2023-12-03 RX ORDER — IBUPROFEN 200 MG
16 TABLET ORAL
Status: DISCONTINUED | OUTPATIENT
Start: 2023-12-03 | End: 2023-12-05 | Stop reason: HOSPADM

## 2023-12-03 RX ORDER — MORPHINE SULFATE 2 MG/ML
4 INJECTION, SOLUTION INTRAMUSCULAR; INTRAVENOUS
Status: COMPLETED | OUTPATIENT
Start: 2023-12-03 | End: 2023-12-03

## 2023-12-03 RX ORDER — FOLIC ACID 1 MG/1
1 TABLET ORAL DAILY
Status: DISCONTINUED | OUTPATIENT
Start: 2023-12-03 | End: 2023-12-04

## 2023-12-03 RX ORDER — MAGNESIUM SULFATE 1 G/100ML
1 INJECTION INTRAVENOUS ONCE
Status: COMPLETED | OUTPATIENT
Start: 2023-12-03 | End: 2023-12-03

## 2023-12-03 RX ORDER — AMLODIPINE BESYLATE 5 MG/1
5 TABLET ORAL DAILY
Status: DISCONTINUED | OUTPATIENT
Start: 2023-12-03 | End: 2023-12-05

## 2023-12-03 RX ORDER — SODIUM CHLORIDE 0.9 % (FLUSH) 0.9 %
10 SYRINGE (ML) INJECTION EVERY 12 HOURS PRN
Status: DISCONTINUED | OUTPATIENT
Start: 2023-12-03 | End: 2023-12-05 | Stop reason: HOSPADM

## 2023-12-03 RX ADMIN — SODIUM CHLORIDE, POTASSIUM CHLORIDE, SODIUM LACTATE AND CALCIUM CHLORIDE: 600; 310; 30; 20 INJECTION, SOLUTION INTRAVENOUS at 05:12

## 2023-12-03 RX ADMIN — ENOXAPARIN SODIUM 40 MG: 40 INJECTION SUBCUTANEOUS at 05:12

## 2023-12-03 RX ADMIN — LABETALOL HYDROCHLORIDE 10 MG: 5 INJECTION, SOLUTION INTRAVENOUS at 12:12

## 2023-12-03 RX ADMIN — HYDROMORPHONE HYDROCHLORIDE 1 MG: 1 INJECTION, SOLUTION INTRAMUSCULAR; INTRAVENOUS; SUBCUTANEOUS at 07:12

## 2023-12-03 RX ADMIN — HYDROMORPHONE HYDROCHLORIDE 1 MG: 1 INJECTION, SOLUTION INTRAMUSCULAR; INTRAVENOUS; SUBCUTANEOUS at 04:12

## 2023-12-03 RX ADMIN — LABETALOL HYDROCHLORIDE 10 MG: 5 INJECTION, SOLUTION INTRAVENOUS at 05:12

## 2023-12-03 RX ADMIN — PIPERACILLIN AND TAZOBACTAM 4.5 G: 4; .5 INJECTION, POWDER, LYOPHILIZED, FOR SOLUTION INTRAVENOUS; PARENTERAL at 05:12

## 2023-12-03 RX ADMIN — HYDRALAZINE HYDROCHLORIDE 10 MG: 20 INJECTION INTRAMUSCULAR; INTRAVENOUS at 07:12

## 2023-12-03 RX ADMIN — THIAMINE HYDROCHLORIDE 500 MG: 100 INJECTION, SOLUTION INTRAMUSCULAR; INTRAVENOUS at 03:12

## 2023-12-03 RX ADMIN — HYDROMORPHONE HYDROCHLORIDE 1 MG: 1 INJECTION, SOLUTION INTRAMUSCULAR; INTRAVENOUS; SUBCUTANEOUS at 02:12

## 2023-12-03 RX ADMIN — HYDROMORPHONE HYDROCHLORIDE 1 MG: 1 INJECTION, SOLUTION INTRAMUSCULAR; INTRAVENOUS; SUBCUTANEOUS at 11:12

## 2023-12-03 RX ADMIN — ONDANSETRON 4 MG: 2 INJECTION INTRAMUSCULAR; INTRAVENOUS at 04:12

## 2023-12-03 RX ADMIN — SODIUM CHLORIDE, POTASSIUM CHLORIDE, SODIUM LACTATE AND CALCIUM CHLORIDE 1000 ML: 600; 310; 30; 20 INJECTION, SOLUTION INTRAVENOUS at 04:12

## 2023-12-03 RX ADMIN — MAGNESIUM SULFATE IN DEXTROSE 1 G: 10 INJECTION, SOLUTION INTRAVENOUS at 06:12

## 2023-12-03 RX ADMIN — HYDROMORPHONE HYDROCHLORIDE 1 MG: 1 INJECTION, SOLUTION INTRAMUSCULAR; INTRAVENOUS; SUBCUTANEOUS at 08:12

## 2023-12-03 RX ADMIN — IOHEXOL 100 ML: 350 INJECTION, SOLUTION INTRAVENOUS at 04:12

## 2023-12-03 RX ADMIN — SODIUM CHLORIDE, POTASSIUM CHLORIDE, SODIUM LACTATE AND CALCIUM CHLORIDE: 600; 310; 30; 20 INJECTION, SOLUTION INTRAVENOUS at 12:12

## 2023-12-03 RX ADMIN — THIAMINE HYDROCHLORIDE 500 MG: 100 INJECTION, SOLUTION INTRAMUSCULAR; INTRAVENOUS at 09:12

## 2023-12-03 RX ADMIN — ONDANSETRON 4 MG: 2 INJECTION INTRAMUSCULAR; INTRAVENOUS at 02:12

## 2023-12-03 RX ADMIN — THIAMINE HYDROCHLORIDE 500 MG: 100 INJECTION, SOLUTION INTRAMUSCULAR; INTRAVENOUS at 07:12

## 2023-12-03 RX ADMIN — FOLIC ACID 1 MG: 1 TABLET ORAL at 08:12

## 2023-12-03 RX ADMIN — AMLODIPINE BESYLATE 5 MG: 5 TABLET ORAL at 12:12

## 2023-12-03 RX ADMIN — SODIUM CHLORIDE, POTASSIUM CHLORIDE, SODIUM LACTATE AND CALCIUM CHLORIDE: 600; 310; 30; 20 INJECTION, SOLUTION INTRAVENOUS at 06:12

## 2023-12-03 RX ADMIN — SODIUM CHLORIDE, POTASSIUM CHLORIDE, SODIUM LACTATE AND CALCIUM CHLORIDE: 600; 310; 30; 20 INJECTION, SOLUTION INTRAVENOUS at 10:12

## 2023-12-03 RX ADMIN — ALUMINUM HYDROXIDE, MAGNESIUM HYDROXIDE, AND SIMETHICONE 15 ML: 200; 200; 20 SUSPENSION ORAL at 04:12

## 2023-12-03 RX ADMIN — MORPHINE SULFATE 4 MG: 2 INJECTION, SOLUTION INTRAMUSCULAR; INTRAVENOUS at 04:12

## 2023-12-03 NOTE — ED PROVIDER NOTES
ED PROVIDER NOTE  12/3/2023    CHIEF COMPLAINT:   Chief Complaint   Patient presents with    Pancreatitis     Pt presents to ed with diffuse abd pain at present. Pt states Hx of pancreatitis. Pt also reports n/v.        HISTORY OF PRESENT ILLNESS:   Osei Wellington is a 28 y.o. male who presents with chief complaint Abdominal pain. Onset was today with epigastric abdominal pain that radiates into his chest associated with nausea, vomiting, and shortness of breath. Reports feels like when he had pancreatitis in the past. Admits to drinking alcohol today. Denies hematemesis.    The history is provided by the patient.         REVIEW OF SYSTEMS: as noted in the HPI.  NURSING NOTES REVIEWED      PAST MEDICAL/SURGICAL HISTORY:   Past Medical History:   Diagnosis Date    Pancreatitis 2020    History reviewed. No pertinent surgical history.    FAMILY HISTORY: History reviewed. No pertinent family history.    SOCIAL HISTORY:   Social History     Substance Use Topics    Alcohol use: Yes     Comment: Binge drinker    Drug use: Yes     Types: Marijuana     Comment: Vape       ALLERGIES: Review of patient's allergies indicates:  No Known Allergies    PHYSICAL EXAM:  Initial Vitals [12/03/23 0242]   BP Pulse Resp Temp SpO2   (!) 171/97 68 (!) 22 97.5 °F (36.4 °C) 99 %      MAP       --         Physical Exam    Nursing note and vitals reviewed.  Constitutional: He appears well-developed and well-nourished. He appears distressed.   HENT:   Head: Normocephalic and atraumatic.   Nose: Nose normal.   Mouth/Throat: Oropharynx is clear and moist and mucous membranes are normal.   Eyes: Conjunctivae and EOM are normal. Pupils are equal, round, and reactive to light.   Neck: Neck supple. No tracheal deviation present.   Cardiovascular:  Regular rhythm, normal heart sounds, intact distal pulses and normal pulses.   Bradycardia present.         Pulmonary/Chest: Effort normal and breath sounds normal. No respiratory distress.   Abdominal:  Abdomen is soft. There is abdominal tenderness in the epigastric area. There is guarding. There is no rebound.   Musculoskeletal:         General: Normal range of motion.      Cervical back: Neck supple.     Neurological: He is alert and oriented to person, place, and time. GCS eye subscore is 4. GCS verbal subscore is 5. GCS motor subscore is 6.   CN II-XII intact. Moves all extremities. No gross sensory or motor deficits.   Skin: Skin is warm, dry and intact.   Psychiatric: He has a normal mood and affect. His speech is normal and behavior is normal. Judgment and thought content normal. Cognition and memory are normal.         RESULTS:  Labs Reviewed   COMPREHENSIVE METABOLIC PANEL - Abnormal; Notable for the following components:       Result Value    Glucose Level 127 (*)     All other components within normal limits   LIPASE - Abnormal; Notable for the following components:    Lipase Level 1,849 (*)     All other components within normal limits   CBC WITH DIFFERENTIAL - Abnormal; Notable for the following components:    WBC 12.64 (*)     MPV 10.9 (*)     Neut # 11.21 (*)     All other components within normal limits   COMPREHENSIVE METABOLIC PANEL - Abnormal; Notable for the following components:    Glucose Level 122 (*)     All other components within normal limits   CBC WITH DIFFERENTIAL - Abnormal; Notable for the following components:    WBC 11.64 (*)     MPV 11.0 (*)     Lymph # 0.33 (*)     Neut # 10.62 (*)     All other components within normal limits   MAGNESIUM - Normal   ALCOHOL,MEDICAL (ETHANOL) - Normal   LACTIC ACID, PLASMA - Normal   PHOSPHORUS - Normal   CBC W/ AUTO DIFFERENTIAL    Narrative:     The following orders were created for panel order CBC auto differential.  Procedure                               Abnormality         Status                     ---------                               -----------         ------                     CBC with Differential[[id number]]        Abnormal             Final result                 Please view results for these tests on the individual orders.   EXTRA TUBES    Narrative:     The following orders were created for panel order EXTRA TUBES.  Procedure                               Abnormality         Status                     ---------                               -----------         ------                     Light Blue Top Hold[[id number]]                              Final result               Light Green Top Hold[[id number]]                             Final result               Lavender Top Hold[[id number]]                                Final result               Gold Top Hold[[id number]]                                   Final result                 Please view results for these tests on the individual orders.   LIGHT BLUE TOP HOLD   LIGHT GREEN TOP HOLD   LAVENDER TOP HOLD   GOLD TOP HOLD   CBC W/ AUTO DIFFERENTIAL    Narrative:     The following orders were created for panel order CBC with Automated Differential.  Procedure                               Abnormality         Status                     ---------                               -----------         ------                     CBC with Differential[[id number]]       Abnormal            Final result                 Please view results for these tests on the individual orders.   EXTRA TUBES    Narrative:     The following orders were created for panel order EXTRA TUBES.  Procedure                               Abnormality         Status                     ---------                               -----------         ------                     Light Blue Top Hold[[id number]]                             Final result               Light Green Top Hold[[id number]]                            Final result               Lavender Top Hold[[id number]]                               Final result               Gold Top Hold[[id number]]                                   Final result                 Please view results  for these tests on the individual orders.   LIGHT BLUE TOP HOLD   LIGHT GREEN TOP HOLD   LAVENDER TOP HOLD   GOLD TOP HOLD     Imaging Results              CT Abdomen Pelvis With IV Contrast NO Oral Contrast (Final result)  Result time 12/03/23 07:08:34      Final result by Yossi Murcia MD (12/03/23 07:08:34)                   Impression:    Impression:    1. Edematous pancreas with surrounding peripancreatic fat stranding and fluid in the bilateral anterior pararenal spaces and paracolic gutters. There is progression in the ascites seen since the prior study. This is suggestive of acute interstitial pancreatitis. Correlate with clinical and laboratory findings as regards further evaluation and follow-up.    2. Details and other findings as discussed above.    No significant discrepancy with overnight report.      Electronically signed by: Yossi Murcia  Date:    12/03/2023  Time:    07:08               Narrative:      Technique:CT of the abdomen and pelvis was performed with axial images as well as sagittal and coronal reconstruction images with intravenous contrast.    Comparison:Comparison is with study dated 2023-01-11 20:53:35.    Clinical History:Pancreatitis, acute, severe.    Dosage Information:Automated Exposure Control was utilized 245.34 mGy.cm.    Findings:    Lines and Tubes:None.    Thorax:    Lungs:The visualized lung bases appear unremarkable.    Pleura:No effusions or thickening.    Abdomen:    Abdominal Wall:No abdominal wall pathology is seen.    Liver:The liver appears unremarkable.    Biliary System:No intrahepatic or extrahepatic biliary duct dilatation is seen.    Gallbladder:The gallbladder appears unremarkable.    Pancreas:Edematous pancreas with surrounding peripancreatic fat stranding and fluid in the bilateral anterior pararenal spaces and paracolic gutters. There is progression in the ascites seen since the prior study. This is suggestive of acute interstitial  pancreatitis.    Spleen:The spleen appears unremarkable.    Adrenals:The adrenal glands appear unremarkable.    Kidneys:The kidneys appear unremarkable with no stones cysts masses or hydronephrosis.    Bowel:    Esophagus:The visualized esophagus appears unremarkable.    Stomach:The stomach appears unremarkable.    Duodenum:Unremarkable appearing duodenum.    Small Bowel:The small bowel appears unremarkable.    Colon:Nondistended.    Appendix:The appendix appears unremarkable and is seen on image 116, Series 2.    Pelvis:    Bladder:The bladder appears unremarkable.    Male:    Prostate gland:The prostate gland appears unremarkable.    Bony structures:    Dorsal Spine:The visualized dorsal spine appears unremarkable. Sacralized L5 is seen.    Bony Pelvis:The visualized bony structures of the pelvis appear unremarkable.                        Preliminary result by Lalo Rubin MD (12/03/23 05:06:48)                   Impression:    1. Edematous pancreas with surrounding peripancreatic fat stranding and fluid in the bilateral anterior pararenal spaces and paracolic gutters. There is progression in the ascites seen since the prior study. This is suggestive of acute interstitial pancreatitis. Correlate with clinical and laboratory findings as regards further evaluation and follow-up.  2. Details and other findings as discussed above.               Narrative:    START OF REPORT:  Technique: CT of the abdomen and pelvis was performed with axial images as well as sagittal and coronal reconstruction images with intravenous contrast.    Comparison: Comparison is with study dated 2023-01-11 20:53:35.    Clinical History: Pancreatitis,acute,severe.    Dosage Information: Automated Exposure Control was utilized 245.34 mGy.cm.    Findings:  Lines and Tubes: None.  Thorax:  Lungs: The visualized lung bases appear unremarkable.  Pleura: No effusions or thickening.  Heart: The heart size is within normal  limits.  Abdomen:  Abdominal Wall: No abdominal wall pathology is seen.  Liver: The liver appears unremarkable.  Biliary System: No intrahepatic or extrahepatic biliary duct dilatation is seen.  Gallbladder: The gallbladder appears unremarkable.  Pancreas: Edematous pancreas with surrounding peripancreatic fat stranding and fluid in the bilateral anterior pararenal spaces and paracolic gutters. There is progression in the ascites seen since the prior study. This is suggestive of acute interstitial pancreatitis.  Spleen: The spleen appears unremarkable.  Adrenals: The adrenal glands appear unremarkable.  Kidneys: The kidneys appear unremarkable with no stones cysts masses or hydronephrosis.  Aorta: The abdominal aorta appears unremarkable.  IVC: Unremarkable.  Bowel:  Esophagus: The visualized esophagus appears unremarkable.  Stomach: The stomach appears unremarkable.  Duodenum: Unremarkable appearing duodenum.  Small Bowel: The small bowel appears unremarkable.  Colon: Nondistended.  Appendix: The appendix appears unremarkable and is seen on Image 116, Series 2.    Pelvis:  Bladder: The bladder appears unremarkable.  Male:  Prostate gland: The prostate gland appears unremarkable.    Bony structures:  Dorsal Spine: The visualized dorsal spine appears unremarkable. Sacralized L5 is seen.  Bony Pelvis: The visualized bony structures of the pelvis appear unremarkable.                          Wet Read by Valentino Augustine DO (12/03/23 04:41:40, [facility], Emergency Medicine)    Free fluid in the abdomen which appears to mostly centered around the pancreas. No visible pneumoperitoneum.                                     X-Ray Chest AP Portable (Final result)  Result time 12/03/23 07:53:26      Final result by Ranulfo Mckeon MD (12/03/23 07:53:26)                   Impression:      No acute findings.      Electronically signed by: Ranulfo Mckeon  Date:    12/03/2023  Time:    07:53                Narrative:    EXAMINATION:  XR CHEST AP PORTABLE    CLINICAL HISTORY:  chest pain;    COMPARISON:  No priors    FINDINGS:  Frontal view of the chest was obtained. The heart is not enlarged.  The lungs are clear.  Azygous lobe incidentally noted.  No pneumothorax.                        Wet Read by Valentino Augustine DO (12/03/23 04:39:05, [facility], Emergency Medicine)    No dense lobar consolidation.                                    PROCEDURES:  Procedures    ECG:  EKG Readings: (Independently Interpreted)   Initial Reading: No STEMI. Rhythm: Sinus Bradycardia. Heart Rate: 59. Ectopy: No Ectopy. Conduction: Normal. Axis: Left Axis Deviation.       ED COURSE AND MEDICAL DECISION MAKING:  Medications   ondansetron injection 4 mg (4 mg Intravenous Given 12/3/23 0407)   aluminum-magnesium hydroxide-simethicone 200-200-20 mg/5 mL suspension 15 mL (15 mLs Oral Given 12/3/23 0406)   morphine injection 4 mg (4 mg Intravenous Given 12/3/23 0406)   iohexoL (OMNIPAQUE 350) 350 mg iodine/mL injection (100 mLs Intravenous Given 12/3/23 0415)   piperacillin-tazobactam (ZOSYN) 4.5 g in dextrose 5 % in water (D5W) 100 mL IVPB (MB+) (0 g Intravenous Stopped 12/3/23 0619)   HYDROmorphone injection 1 mg (1 mg Intravenous Given 12/3/23 0443)   lactated ringers bolus 1,000 mL (0 mLs Intravenous Stopped 12/3/23 0544)   magnesium sulfate in dextrose IVPB (premix) 1 g (0 g Intravenous Stopped 12/3/23 0730)     ED Course as of 12/05/23 1814   Sun Dec 03, 2023   0331 WBC(!): 12.64 [IB]   0331 Hemoglobin: 16.0 [IB]   0331 Platelet Count: 246 [IB]   0358 Lipase(!): 1,849 [IB]   0358 Magnesium : 1.90 [IB]   0358 Creatinine: 0.86 [IB]   0359 BILIRUBIN TOTAL: 1.2 [IB]   0359 ALP: 66 [IB]   0359 ALT: 21 [IB]   0359 AST: 27 [IB]   0437 Alcohol, Serum: <10.0 [IB]      ED Course User Index  [IB] Douglas, Valentino, DO        Medical Decision Making  28-year-old male who presents with epigastric abdominal pain associated with  nausea and vomiting that he states feels like when he is had pancreatitis in the past.  He endorses recent alcohol use.  CBC shows leukocytosis of 12.64.  CMP grossly unremarkable.  Lipase elevated 1849.  CT abdomen and pelvis shows edematous pancreas with surrounding peripancreatic fat consistent with interstitial pancreatitis.  Covered with Zosyn and will admit to medicine.  I have spoken with the patient and/or caregivers. I have explained the patient's condition, diagnoses and treatment plan based on the information available to me at this time. I have answered the patient's and/or caregiver's questions and addressed any concerns. The patient and/or caregivers have as good an understanding of the patient's diagnosis, condition and treatment plan as can be expected at this point. The patient has been stabilized within the capability of the emergency department. The patient will be transported for further care and management or will be moved to an observation or inpatient service. I have communicated with the staff or medical practitioner taking over this patient's care.    Amount and/or Complexity of Data Reviewed  Labs: ordered. Decision-making details documented in ED Course.  Radiology: ordered and independent interpretation performed.  ECG/medicine tests: ordered and independent interpretation performed.    Risk  OTC drugs.  Prescription drug management.  Parenteral controlled substances.  Decision regarding hospitalization.        CLINICAL IMPRESSION:  1. Alcohol-induced acute pancreatitis, unspecified complication status    2. Chest pain        DISPOSITION:   ED Disposition Condition    Admit Stable                    Valentino Augustine DO  12/05/23 0839

## 2023-12-03 NOTE — H&P
[facility] Medicine Wards History & Physical Note     Resident Team: [facility] Medicine List 1  Attending Physician: Ramon Velasquez MD  Resident: Maria Eugenia Saunders MD  Intern: Pepe Alaniz MD     Date of Admit: 12/3/2023    Chief Complaint     Pancreatitis (Pt presents to ed with diffuse abd pain at present. Pt states Hx of pancreatitis. Pt also reports n/v. )       Subjective:      History of Present Illness:  Osei Wellington is a 28 y.o.  male with a history of binge drinking who presented on 12/3/2023  with c/o abdominal pain onset 1 day ago.  Pain described as sharp, upper abdominal, 12/10, and radiating to his back.  Endorses some nausea and vomiting.  Explains that at around 11:00 a.m. he began to feel off in his stomach and by 11:00 p.m. he was in significant pain.  11:00 a.m. symptoms were preceded by binge drinking episode featuring 24 beers over the weekend.  Patient has had pancreatitis 3 years ago and 18 months ago.  States that his symptoms now are the same as on those 2 prior occasions.  He denies any headache, fevers, chills, chest pain, shortness for breath, or diarrhea.    In the ED, heart rate was 68, BP was 171/97, and he was afebrile.  White count was 12.64, lipase was 1849, and amylase of 2438.  BUN was in normal limits.  CT abdomen pelvis show evidence of acute interstitial pancreatitis.  He was given 4 mg of morphine without significant relief.  The pain improved to 6/10 with 1 mg of Dilaudid.  Patient to be admitted for acute pancreatitis.      Past Medical History:  Past Medical History:   Diagnosis Date    Pancreatitis 2020       Past Surgical History:  History reviewed. No pertinent surgical history.    Family History:  History reviewed. No pertinent family history.    Social History:  Social History     Substance Use Topics    Alcohol use: Yes     Comment: Binge drinker    Drug use: Yes     Types: Marijuana     Comment: Vape       Allergies:  Review of patient's allergies indicates:  No Known  "Allergies    Home Medications:  Prior to Admission medications    Not on File       Review of Systems:  Review of Systems   Constitutional:  Negative for chills and fever.   Respiratory:  Negative for cough, shortness of breath and wheezing.    Cardiovascular:  Negative for chest pain and palpitations.   Gastrointestinal:  Positive for abdominal pain, nausea and vomiting. Negative for diarrhea.   Genitourinary:  Negative for dysuria.   Neurological:  Negative for dizziness, tingling, weakness and headaches.        Objective:   Last 24 Hour Vital Signs:  BP  Min: 156/99  Max: 202/99  Temp  Av.5 °F (36.4 °C)  Min: 97.5 °F (36.4 °C)  Max: 97.5 °F (36.4 °C)  Pulse  Av.2  Min: 52  Max: 73  Resp  Av.1  Min: 10  Max: 24  SpO2  Av.1 %  Min: 97 %  Max: 100 %  Height  Av' 3" (190.5 cm)  Min: 6' 3" (190.5 cm)  Max: 6' 3" (190.5 cm)  Weight  Av.6 kg (180 lb)  Min: 81.6 kg (180 lb)  Max: 81.6 kg (180 lb)  Body mass index is 22.5 kg/m².  No intake/output data recorded.    Physical Examination:  Physical Exam  Constitutional:       General: He is not in acute distress.     Appearance: He is not ill-appearing.   Cardiovascular:      Rate and Rhythm: Normal rate and regular rhythm.      Heart sounds: No murmur heard.  Pulmonary:      Effort: No respiratory distress.      Breath sounds: Normal breath sounds. No wheezing.   Abdominal:      General: There is no distension.      Palpations: Abdomen is soft.      Tenderness: There is abdominal tenderness.   Skin:     General: Skin is warm and dry.      Capillary Refill: Capillary refill takes less than 2 seconds.   Neurological:      Mental Status: Mental status is at baseline.      Motor: No weakness.   Psychiatric:         Mood and Affect: Mood normal.          Laboratory:  Most Recent Data:  CBC:   Lab Results   Component Value Date    WBC 11.64 (H) 2023    HGB 15.6 2023    HCT 46.5 2023     2023    MCV 92.1 2023    RDW " "12.2 12/03/2023     WBC Differential:   Recent Labs   Lab 12/03/23  0311 12/03/23  0552   WBC 12.64* 11.64*   HGB 16.0 15.6   HCT 47.3 46.5    209   MCV 90.6 92.1     BMP:   Lab Results   Component Value Date     12/03/2023    K 5.0 12/03/2023    CO2 24 12/03/2023    BUN 8.9 12/03/2023    CREATININE 0.86 12/03/2023    CALCIUM 8.7 12/03/2023    MG 1.90 12/03/2023    PHOS 2.2 (L) 04/20/2021     LFTs:   Lab Results   Component Value Date    ALBUMIN 4.0 12/03/2023    BILITOT 1.0 12/03/2023    AST 26 12/03/2023    ALKPHOS 65 12/03/2023    ALT 20 12/03/2023     Coags: No results found for: "INR", "PROTIME", "PTT"  FLP:   Lab Results   Component Value Date    CHOL 162 01/11/2023    HDL 65 (H) 01/11/2023    TRIG 129 01/11/2023     DM:   Lab Results   Component Value Date    HGBA1C 5.0 04/19/2021    CREATININE 0.86 12/03/2023     Thyroid:   Lab Results   Component Value Date    TSH 0.6212 04/19/2021      Anemia: No results found for: "IRON", "TIBC", "FERRITIN", "SATURATEDIRO"  No results found for: "RHWQBHRC65"  No results found for: "FOLATE"     Cardiac: No results found for: "TROPONINI", "CKTOTAL", "CKMB", "BNP"  Urinalysis:   Lab Results   Component Value Date    COLORU LIGHT YELLOW 04/18/2021    PHUA 8.0 04/18/2021    NITRITE Negative 04/18/2021    KETONESU Trace (A) 04/18/2021    UROBILINOGEN Normal 04/18/2021    WBCUA 0-2 04/18/2021       Trended Lab Data:  Recent Labs   Lab 12/03/23 0311 12/03/23  0552   WBC 12.64* 11.64*   HGB 16.0 15.6   HCT 47.3 46.5    209   MCV 90.6 92.1   RDW 12.2 12.2    137   K 3.8 5.0   CO2 24 24   BUN 9.9 8.9   CREATININE 0.86 0.86   ALBUMIN 4.2 4.0   BILITOT 1.2 1.0   AST 27 26   ALKPHOS 66 65   ALT 21 20       Trended Cardiac Data:  No results for input(s): "TROPONINI", "CKTOTAL", "CKMB", "BNP" in the last 168 hours.    Microbiology Data:  Microbiology Results (last 7 days)       Procedure Component Value Units Date/Time    Blood culture #2 **CANNOT BE ORDERED " STAT** [[id number]] Collected: 12/03/23 0545    Order Status: Sent Specimen: Blood from Forearm, Left Updated: 12/03/23 0548    Blood culture #1 **CANNOT BE ORDERED STAT** [[id number]] Collected: 12/03/23 0545    Order Status: Sent Specimen: Blood from Antecubital, Left Updated: 12/03/23 0548             Other Results:  EKG: No results found for this or any previous visit.    Radiology:  Imaging Results              CT Abdomen Pelvis With IV Contrast NO Oral Contrast (Preliminary result)  Result time 12/03/23 05:06:48      Preliminary result by Lalo Rubin MD (12/03/23 05:06:48)                   Narrative:    START OF REPORT:  Technique: CT of the abdomen and pelvis was performed with axial images as well as sagittal and coronal reconstruction images with intravenous contrast.    Comparison: Comparison is with study dated 2023-01-11 20:53:35.    Clinical History: Pancreatitis,acute,severe.    Dosage Information: Automated Exposure Control was utilized 245.34 mGy.cm.    Findings:  Lines and Tubes: None.  Thorax:  Lungs: The visualized lung bases appear unremarkable.  Pleura: No effusions or thickening.  Heart: The heart size is within normal limits.  Abdomen:  Abdominal Wall: No abdominal wall pathology is seen.  Liver: The liver appears unremarkable.  Biliary System: No intrahepatic or extrahepatic biliary duct dilatation is seen.  Gallbladder: The gallbladder appears unremarkable.  Pancreas: Edematous pancreas with surrounding peripancreatic fat stranding and fluid in the bilateral anterior pararenal spaces and paracolic gutters. There is progression in the ascites seen since the prior study. This is suggestive of acute interstitial pancreatitis.  Spleen: The spleen appears unremarkable.  Adrenals: The adrenal glands appear unremarkable.  Kidneys: The kidneys appear unremarkable with no stones cysts masses or hydronephrosis.  Aorta: The abdominal aorta appears unremarkable.  IVC:  Unremarkable.  Bowel:  Esophagus: The visualized esophagus appears unremarkable.  Stomach: The stomach appears unremarkable.  Duodenum: Unremarkable appearing duodenum.  Small Bowel: The small bowel appears unremarkable.  Colon: Nondistended.  Appendix: The appendix appears unremarkable and is seen on Image 116, Series 2.    Pelvis:  Bladder: The bladder appears unremarkable.  Male:  Prostate gland: The prostate gland appears unremarkable.    Bony structures:  Dorsal Spine: The visualized dorsal spine appears unremarkable. Sacralized L5 is seen.  Bony Pelvis: The visualized bony structures of the pelvis appear unremarkable.      Impression:  1. Edematous pancreas with surrounding peripancreatic fat stranding and fluid in the bilateral anterior pararenal spaces and paracolic gutters. There is progression in the ascites seen since the prior study. This is suggestive of acute interstitial pancreatitis. Correlate with clinical and laboratory findings as regards further evaluation and follow-up.  2. Details and other findings as discussed above.                          Wet Read by Valentino Augustine DO (12/03/23 04:41:40, [facility], Emergency Medicine)    Free fluid in the abdomen which appears to mostly centered around the pancreas. No visible pneumoperitoneum.                                     X-Ray Chest AP Portable (Preliminary result)  Result time 12/03/23 04:39:05      Wet Read by Valentino Augustine DO (12/03/23 04:39:05, [facility], Emergency Medicine)    No dense lobar consolidation.                                       Assessment & Plan:     Acute Pancreatitis  -likely secondary to excessive alcohol intake  -significant abdominal pain with nausea and vomiting  -lipase 1849   -CT abdomen showed evidence for interstitial pancreatitis  -we will hydrate with LR at 200 ml/hr  -ordering Dilaudid 1 mg q.4 hours for pain control  -patient received Zosyn in the ED, we will  discontinue  - NPO for now, but can advance diet as tolerated    Alcohol abuse  - 24 beers over the weekend  -EtOH less than 10 mg/dL on admission  -last drink was at 11:00 a.m. on 12/2/23 (about 19hrs ago)  -UDS and phosphorus ordered  -repleting Mag as necessary  -CIWA protocol in place  -we will give folic acid and thiamine      CODE STATUS:  Full code  Diet:  NPO, advance as tolerated  DVT Prophylaxis:   Anticoagulants   Medication Route Frequency    enoxaparin injection 40 mg Subcutaneous Q24H (prophylaxis, 1700)             Pepe Alaniz MD  [facility] HO-1

## 2023-12-03 NOTE — LETTER
December 5, 2023         [address] [facility] [address]  Phone: [phone]  Fax: [phone]       Patient: Osei Wellington  YOB: 1995  Date of Visit: 12/05/2023    To Whom It May Concern:    Osei Wellington was at brought to the [facility] ED  on 123/23 by his brother, Alhaji Wellington. If you have any questions or concerns, or if I can be of further assistance, please do not hesitate to contact my office.    Sincerely,    [facility] Treatment Team

## 2023-12-03 NOTE — LETTER
December 6, 2023         [address] [facility] [address]  Phone: [phone]  Fax: [phone]       Patient: Osei Wellington   YOB: 1995  Date of Visit: 12/06/2023    To Whom It May Concern:    José Miguel Wellington  was at [facility] on 12/06/2023. He may return to work/school on 12/11/2023 with no restrictions. If you have any questions or concerns, or if I can be of further assistance, please do not hesitate to contact me.    Sincerely,    Vinny Aldridge MD

## 2023-12-04 LAB
ALBUMIN SERPL-MCNC: 3.3 G/DL (ref 3.5–5)
ALBUMIN/GLOB SERPL: 1.3 RATIO (ref 1.1–2)
ALP SERPL-CCNC: 46 UNIT/L (ref 40–150)
ALT SERPL-CCNC: 12 UNIT/L (ref 0–55)
AST SERPL-CCNC: 16 UNIT/L (ref 5–34)
BASOPHILS # BLD AUTO: 0.02 X10(3)/MCL
BASOPHILS NFR BLD AUTO: 0.2 %
BILIRUB SERPL-MCNC: 0.8 MG/DL
BUN SERPL-MCNC: 5.9 MG/DL (ref 8.9–20.6)
CALCIUM SERPL-MCNC: 8.6 MG/DL (ref 8.4–10.2)
CHLORIDE SERPL-SCNC: 101 MMOL/L (ref 98–107)
CO2 SERPL-SCNC: 27 MMOL/L (ref 22–29)
CREAT SERPL-MCNC: 0.79 MG/DL (ref 0.73–1.18)
EOSINOPHIL # BLD AUTO: 0.15 X10(3)/MCL (ref 0–0.9)
EOSINOPHIL NFR BLD AUTO: 1.4 %
ERYTHROCYTE [DISTWIDTH] IN BLOOD BY AUTOMATED COUNT: 12.4 % (ref 11.5–17)
GFR SERPLBLD CREATININE-BSD FMLA CKD-EPI: >60 MLS/MIN/1.73/M2
GLOBULIN SER-MCNC: 2.6 GM/DL (ref 2.4–3.5)
GLUCOSE SERPL-MCNC: 93 MG/DL (ref 74–100)
HCT VFR BLD AUTO: 40.8 % (ref 42–52)
HGB BLD-MCNC: 13.6 G/DL (ref 14–18)
IMM GRANULOCYTES # BLD AUTO: 0.03 X10(3)/MCL (ref 0–0.04)
IMM GRANULOCYTES NFR BLD AUTO: 0.3 %
LYMPHOCYTES # BLD AUTO: 1.3 X10(3)/MCL (ref 0.6–4.6)
LYMPHOCYTES NFR BLD AUTO: 12.4 %
MCH RBC QN AUTO: 30.9 PG (ref 27–31)
MCHC RBC AUTO-ENTMCNC: 33.3 G/DL (ref 33–36)
MCV RBC AUTO: 92.7 FL (ref 80–94)
MONOCYTES # BLD AUTO: 1 X10(3)/MCL (ref 0.1–1.3)
MONOCYTES NFR BLD AUTO: 9.5 %
NEUTROPHILS # BLD AUTO: 8.02 X10(3)/MCL (ref 2.1–9.2)
NEUTROPHILS NFR BLD AUTO: 76.2 %
NRBC BLD AUTO-RTO: 0 %
PLATELET # BLD AUTO: 208 X10(3)/MCL (ref 130–400)
PMV BLD AUTO: 11.2 FL (ref 7.4–10.4)
POTASSIUM SERPL-SCNC: 4.1 MMOL/L (ref 3.5–5.1)
PROT SERPL-MCNC: 5.9 GM/DL (ref 6.4–8.3)
RBC # BLD AUTO: 4.4 X10(6)/MCL (ref 4.7–6.1)
SODIUM SERPL-SCNC: 137 MMOL/L (ref 136–145)
WBC # SPEC AUTO: 10.52 X10(3)/MCL (ref 4.5–11.5)

## 2023-12-04 PROCEDURE — 25000003 PHARM REV CODE 250

## 2023-12-04 PROCEDURE — 21400001 HC TELEMETRY ROOM

## 2023-12-04 PROCEDURE — 63600175 PHARM REV CODE 636 W HCPCS

## 2023-12-04 PROCEDURE — 85025 COMPLETE CBC W/AUTO DIFF WBC: CPT

## 2023-12-04 PROCEDURE — 80053 COMPREHEN METABOLIC PANEL: CPT

## 2023-12-04 PROCEDURE — A4216 STERILE WATER/SALINE, 10 ML: HCPCS

## 2023-12-04 RX ADMIN — THIAMINE HYDROCHLORIDE 500 MG: 100 INJECTION, SOLUTION INTRAMUSCULAR; INTRAVENOUS at 05:12

## 2023-12-04 RX ADMIN — HYDROMORPHONE HYDROCHLORIDE 1 MG: 1 INJECTION, SOLUTION INTRAMUSCULAR; INTRAVENOUS; SUBCUTANEOUS at 02:12

## 2023-12-04 RX ADMIN — Medication 10 ML: at 05:12

## 2023-12-04 RX ADMIN — HYDROMORPHONE HYDROCHLORIDE 1 MG: 1 INJECTION, SOLUTION INTRAMUSCULAR; INTRAVENOUS; SUBCUTANEOUS at 09:12

## 2023-12-04 RX ADMIN — SODIUM CHLORIDE, POTASSIUM CHLORIDE, SODIUM LACTATE AND CALCIUM CHLORIDE: 600; 310; 30; 20 INJECTION, SOLUTION INTRAVENOUS at 03:12

## 2023-12-04 RX ADMIN — HYDROMORPHONE HYDROCHLORIDE 1 MG: 1 INJECTION, SOLUTION INTRAMUSCULAR; INTRAVENOUS; SUBCUTANEOUS at 06:12

## 2023-12-04 RX ADMIN — AMLODIPINE BESYLATE 5 MG: 5 TABLET ORAL at 09:12

## 2023-12-04 RX ADMIN — HYDROMORPHONE HYDROCHLORIDE 1 MG: 1 INJECTION, SOLUTION INTRAMUSCULAR; INTRAVENOUS; SUBCUTANEOUS at 11:12

## 2023-12-04 RX ADMIN — SODIUM CHLORIDE, POTASSIUM CHLORIDE, SODIUM LACTATE AND CALCIUM CHLORIDE: 600; 310; 30; 20 INJECTION, SOLUTION INTRAVENOUS at 02:12

## 2023-12-04 RX ADMIN — ENOXAPARIN SODIUM 40 MG: 40 INJECTION SUBCUTANEOUS at 06:12

## 2023-12-04 RX ADMIN — HYDROMORPHONE HYDROCHLORIDE 1 MG: 1 INJECTION, SOLUTION INTRAMUSCULAR; INTRAVENOUS; SUBCUTANEOUS at 05:12

## 2023-12-04 RX ADMIN — THIAMINE HYDROCHLORIDE 500 MG: 100 INJECTION, SOLUTION INTRAMUSCULAR; INTRAVENOUS at 02:12

## 2023-12-04 RX ADMIN — FOLIC ACID 1 MG: 1 TABLET ORAL at 09:12

## 2023-12-04 NOTE — PLAN OF CARE
Problem: Adult Inpatient Plan of Care  Goal: Plan of Care Review  Outcome: Ongoing, Progressing  Goal: Patient-Specific Goal (Individualized)  Outcome: Ongoing, Progressing  Goal: Absence of Hospital-Acquired Illness or Injury  Outcome: Ongoing, Progressing  Goal: Optimal Comfort and Wellbeing  Outcome: Ongoing, Progressing  Goal: Readiness for Transition of Care  Outcome: Ongoing, Progressing     Problem: Pain Acute  Goal: Acceptable Pain Control and Functional Ability  Outcome: Ongoing, Progressing     Problem: Fluid Imbalance (Pancreatitis)  Goal: Fluid Balance  Outcome: Ongoing, Progressing     Problem: Infection (Pancreatitis)  Goal: Infection Symptom Resolution  Outcome: Ongoing, Progressing     Problem: Nutrition Impaired (Pancreatitis)  Goal: Optimal Nutrition Intake  Outcome: Ongoing, Progressing     Problem: Pain (Pancreatitis)  Goal: Acceptable Pain Control  Outcome: Ongoing, Progressing     Problem: Respiratory Compromise (Pancreatitis)  Goal: Effective Oxygenation and Ventilation  Outcome: Ongoing, Progressing

## 2023-12-04 NOTE — PLAN OF CARE
12/04/23 1247   Discharge Assessment   Assessment Type Discharge Planning Assessment   Confirmed/corrected address, phone number and insurance Yes   Confirmed Demographics Correct on Facesheet   Source of Information patient;health record   Reason For Admission Chest pain; Alcohol-induced acute pancreatitis   People in Home significant other;child(latrell), dependent   Facility Arrived From: Home   Do you expect to return to your current living situation? Yes   Do you have help at home or someone to help you manage your care at home? Yes   Who are your caregiver(s) and their phone number(s)? OmalleyDonald munoz (Spouse) [phone]   Prior to hospitilization cognitive status: Alert/Oriented   Current cognitive status: Alert/Oriented   Equipment Currently Used at Home none   Readmission within 30 days? No   Patient currently being followed by outpatient case management? No   Do you currently have service(s) that help you manage your care at home? No   Do you take prescription medications? Yes  (José Miguel - [facility])   Do you have prescription coverage? No   Do you have any problems affording any of your prescribed medications? TBD   Is the patient taking medications as prescribed? yes   Who is going to help you get home at discharge? Family   How do you get to doctors appointments? car, drives self   Are you on dialysis? No   DME Needed Upon Discharge  none   Discharge Plan discussed with: Patient   Transition of Care Barriers Substance Abuse   Discharge Plan A Home with family   OTHER   Name(s) of People in Home OmalleyDonald munoz (Spouse) [phone]; 2 dependent children     Pt  with 2 children (12 & 2 1/2 yrs); Employed full time at [employer]; Uninsured - Ineligible for M/D; Independent with ADL's; CM to follow for d/c planning needs.

## 2023-12-04 NOTE — PROGRESS NOTES
[facility] Medicine Wards Progress Note     Resident Team: [facility] Medicine List 1  Attending Physician: Ramon Velasquez MD  Resident: Monet Forrester MD   Intern: Vinny Aldridge MD     Date of Admit: 12/3/2023    Chief Complaint     Pancreatitis (Pt presents to ed with diffuse abd pain at present. Pt states Hx of pancreatitis. Pt also reports n/v. )       Subjective:      History of Present Illness:  Osei Wellington is a 28 y.o.  male with a history of binge drinking who presented on 12/3/2023  with c/o abdominal pain onset 1 day ago.  Pain described as sharp, upper abdominal, 12/10, and radiating to his back.  Endorses some nausea and vomiting.  Explains that at around 11:00 a.m. he began to feel off in his stomach and by 11:00 p.m. he was in significant pain.  11:00 a.m. symptoms were preceded by binge drinking episode featuring 24 beers over the weekend.  Patient has had pancreatitis 3 years ago and 18 months ago.  States that his symptoms now are the same as on those 2 prior occasions.  He denies any headache, fevers, chills, chest pain, shortness for breath, or diarrhea.    In the ED, heart rate was 68, BP was 171/97, and he was afebrile.  White count was 12.64, lipase was 1849, and amylase of 2438.  BUN was in normal limits.  CT abdomen pelvis show evidence of acute interstitial pancreatitis.  He was given 4 mg of morphine without significant relief.  The pain improved to 6/10 with 1 mg of Dilaudid.  Patient to be admitted for acute pancreatitis.    Interval History   Patient doing well today, continues to complain of episodic epigastric pain well controlled on current pain regimen. Has been able to tolerate liquid diet but states he is still experiencing lack of appetite and nausea.  Denies any chest pain, sob, N/V, diarrhea, constipation, F/C.  Continuing IVF and pain control.  Will continue to monitor patients clinical status and advance diet as tolerated.     Past Medical History:  Past Medical History:    Diagnosis Date    Pancreatitis 2020       Past Surgical History:  History reviewed. No pertinent surgical history.    Family History:  History reviewed. No pertinent family history.    Social History:  Social History     Substance Use Topics    Alcohol use: Yes     Comment: Binge drinker    Drug use: Yes     Types: Marijuana     Comment: Vape       Allergies:  Review of patient's allergies indicates:  No Known Allergies    Home Medications:  Prior to Admission medications    Not on File       Review of Systems:  Review of Systems   Constitutional:  Negative for chills and fever.   Respiratory:  Negative for cough, shortness of breath and wheezing.    Cardiovascular:  Negative for chest pain and palpitations.   Gastrointestinal:  Positive for abdominal pain, nausea and vomiting. Negative for diarrhea.   Genitourinary:  Negative for dysuria.   Neurological:  Negative for dizziness, tingling, weakness and headaches.        Objective:   Last 24 Hour Vital Signs:  BP  Min: 128/74  Max: 176/94  Temp  Av.6 °F (37 °C)  Min: 98.1 °F (36.7 °C)  Max: 99.3 °F (37.4 °C)  Pulse  Av.5  Min: 62  Max: 69  Resp  Av.3  Min: 18  Max: 20  SpO2  Av.7 %  Min: 98 %  Max: 99 %  Body mass index is 22.5 kg/m².  I/O last 3 completed shifts:  In: 6503.5 [P.O.:360; I.V.:4653.9; IV Piggyback:1489.6]  Out: -     Physical Examination:  Physical Exam  Constitutional:       General: He is not in acute distress.     Appearance: He is not ill-appearing.   Cardiovascular:      Rate and Rhythm: Normal rate and regular rhythm.      Heart sounds: No murmur heard.  Pulmonary:      Effort: No respiratory distress.      Breath sounds: Normal breath sounds. No wheezing.   Abdominal:      General: There is no distension.      Palpations: Abdomen is soft.      Tenderness: There is abdominal tenderness.   Skin:     General: Skin is warm and dry.      Capillary Refill: Capillary refill takes less than 2 seconds.   Neurological:      Mental  "Status: Mental status is at baseline.      Motor: No weakness.   Psychiatric:         Mood and Affect: Mood normal.        Laboratory:  Most Recent Data:  CBC:   Lab Results   Component Value Date    WBC 10.52 12/04/2023    HGB 13.6 (L) 12/04/2023    HCT 40.8 (L) 12/04/2023     12/04/2023    MCV 92.7 12/04/2023    RDW 12.4 12/04/2023     WBC Differential:   Recent Labs   Lab 12/03/23  0311 12/03/23  0552 12/04/23  0421   WBC 12.64* 11.64* 10.52   HGB 16.0 15.6 13.6*   HCT 47.3 46.5 40.8*    209 208   MCV 90.6 92.1 92.7       BMP:   Lab Results   Component Value Date     12/04/2023    K 4.1 12/04/2023    CO2 27 12/04/2023    BUN 5.9 (L) 12/04/2023    CREATININE 0.79 12/04/2023    CALCIUM 8.6 12/04/2023    MG 1.90 12/03/2023    PHOS 2.9 12/03/2023     LFTs:   Lab Results   Component Value Date    ALBUMIN 3.3 (L) 12/04/2023    BILITOT 0.8 12/04/2023    AST 16 12/04/2023    ALKPHOS 46 12/04/2023    ALT 12 12/04/2023     Coags: No results found for: "INR", "PROTIME", "PTT"  FLP:   Lab Results   Component Value Date    CHOL 162 01/11/2023    HDL 65 (H) 01/11/2023    TRIG 129 01/11/2023     DM:   Lab Results   Component Value Date    HGBA1C 5.0 04/19/2021    CREATININE 0.79 12/04/2023     Thyroid:   Lab Results   Component Value Date    TSH 0.6212 04/19/2021      Anemia: No results found for: "IRON", "TIBC", "FERRITIN", "SATURATEDIRO"  No results found for: "OIRJQUPT85"  No results found for: "FOLATE"     Cardiac: No results found for: "TROPONINI", "CKTOTAL", "CKMB", "BNP"  Urinalysis:   Lab Results   Component Value Date    COLORU LIGHT YELLOW 04/18/2021    PHUA 8.0 04/18/2021    NITRITE Negative 04/18/2021    KETONESU Trace (A) 04/18/2021    UROBILINOGEN Normal 04/18/2021    WBCUA 0-2 04/18/2021       Trended Lab Data:  Recent Labs   Lab 12/03/23  0311 12/03/23  0552 12/04/23  0421   WBC 12.64* 11.64* 10.52   HGB 16.0 15.6 13.6*   HCT 47.3 46.5 40.8*    209 208   MCV 90.6 92.1 92.7   RDW 12.2 " "12.2 12.4    137 137   K 3.8 5.0 4.1   CO2 24 24 27   BUN 9.9 8.9 5.9*   CREATININE 0.86 0.86 0.79   ALBUMIN 4.2 4.0 3.3*   BILITOT 1.2 1.0 0.8   AST 27 26 16   ALKPHOS 66 65 46   ALT 21 20 12         Trended Cardiac Data:  No results for input(s): "TROPONINI", "CKTOTAL", "CKMB", "BNP" in the last 168 hours.    Microbiology Data:  Microbiology Results (last 7 days)       Procedure Component Value Units Date/Time    Blood culture #1 **CANNOT BE ORDERED STAT** [[id number]]  (Normal) Collected: 12/03/23 0545    Order Status: Completed Specimen: Blood from Antecubital, Left Updated: 12/04/23 0800     CULTURE, BLOOD ([facility]) No Growth At 24 Hours    Blood culture #2 **CANNOT BE ORDERED STAT** [[id number]]  (Normal) Collected: 12/03/23 0545    Order Status: Completed Specimen: Blood from Forearm, Left Updated: 12/04/23 0800     CULTURE, BLOOD ([facility]) No Growth At 24 Hours             Other Results:  EKG:   Results for orders placed or performed during the hospital encounter of 12/03/23   EKG 12-lead    Collection Time: 12/03/23  3:45 AM    Narrative    Test Reason : R07.9,    Vent. Rate : 059 BPM     Atrial Rate : 059 BPM     P-R Int : 196 ms          QRS Dur : 112 ms      QT Int : 394 ms       P-R-T Axes : 054 -61 041 degrees     QTc Int : 390 ms    Sinus bradycardia  Left axis deviation  Anterior infarct ,age undetermined  Abnormal ECG  No previous ECGs available    Referred By: [email or web address]   SELF           Confirmed By:        Radiology:  Imaging Results              CT Abdomen Pelvis With IV Contrast NO Oral Contrast (Final result)  Result time 12/03/23 07:08:34      Final result by Yossi Murcia MD (12/03/23 07:08:34)                   Impression:    Impression:    1. Edematous pancreas with surrounding peripancreatic fat stranding and fluid in the bilateral anterior pararenal spaces and paracolic gutters. There is progression in the ascites seen since the prior study. This is suggestive of acute interstitial " pancreatitis. Correlate with clinical and laboratory findings as regards further evaluation and follow-up.    2. Details and other findings as discussed above.    No significant discrepancy with overnight report.      Electronically signed by: Yossi Murcia  Date:    12/03/2023  Time:    07:08               Narrative:      Technique:CT of the abdomen and pelvis was performed with axial images as well as sagittal and coronal reconstruction images with intravenous contrast.    Comparison:Comparison is with study dated 2023-01-11 20:53:35.    Clinical History:Pancreatitis, acute, severe.    Dosage Information:Automated Exposure Control was utilized 245.34 mGy.cm.    Findings:    Lines and Tubes:None.    Thorax:    Lungs:The visualized lung bases appear unremarkable.    Pleura:No effusions or thickening.    Abdomen:    Abdominal Wall:No abdominal wall pathology is seen.    Liver:The liver appears unremarkable.    Biliary System:No intrahepatic or extrahepatic biliary duct dilatation is seen.    Gallbladder:The gallbladder appears unremarkable.    Pancreas:Edematous pancreas with surrounding peripancreatic fat stranding and fluid in the bilateral anterior pararenal spaces and paracolic gutters. There is progression in the ascites seen since the prior study. This is suggestive of acute interstitial pancreatitis.    Spleen:The spleen appears unremarkable.    Adrenals:The adrenal glands appear unremarkable.    Kidneys:The kidneys appear unremarkable with no stones cysts masses or hydronephrosis.    Bowel:    Esophagus:The visualized esophagus appears unremarkable.    Stomach:The stomach appears unremarkable.    Duodenum:Unremarkable appearing duodenum.    Small Bowel:The small bowel appears unremarkable.    Colon:Nondistended.    Appendix:The appendix appears unremarkable and is seen on image 116, Series 2.    Pelvis:    Bladder:The bladder appears unremarkable.    Male:    Prostate gland:The prostate gland appears  unremarkable.    Bony structures:    Dorsal Spine:The visualized dorsal spine appears unremarkable. Sacralized L5 is seen.    Bony Pelvis:The visualized bony structures of the pelvis appear unremarkable.                        Preliminary result by Lalo Rubin MD (12/03/23 05:06:48)                   Impression:    1. Edematous pancreas with surrounding peripancreatic fat stranding and fluid in the bilateral anterior pararenal spaces and paracolic gutters. There is progression in the ascites seen since the prior study. This is suggestive of acute interstitial pancreatitis. Correlate with clinical and laboratory findings as regards further evaluation and follow-up.  2. Details and other findings as discussed above.               Narrative:    START OF REPORT:  Technique: CT of the abdomen and pelvis was performed with axial images as well as sagittal and coronal reconstruction images with intravenous contrast.    Comparison: Comparison is with study dated 2023-01-11 20:53:35.    Clinical History: Pancreatitis,acute,severe.    Dosage Information: Automated Exposure Control was utilized 245.34 mGy.cm.    Findings:  Lines and Tubes: None.  Thorax:  Lungs: The visualized lung bases appear unremarkable.  Pleura: No effusions or thickening.  Heart: The heart size is within normal limits.  Abdomen:  Abdominal Wall: No abdominal wall pathology is seen.  Liver: The liver appears unremarkable.  Biliary System: No intrahepatic or extrahepatic biliary duct dilatation is seen.  Gallbladder: The gallbladder appears unremarkable.  Pancreas: Edematous pancreas with surrounding peripancreatic fat stranding and fluid in the bilateral anterior pararenal spaces and paracolic gutters. There is progression in the ascites seen since the prior study. This is suggestive of acute interstitial pancreatitis.  Spleen: The spleen appears unremarkable.  Adrenals: The adrenal glands appear unremarkable.  Kidneys: The kidneys appear  unremarkable with no stones cysts masses or hydronephrosis.  Aorta: The abdominal aorta appears unremarkable.  IVC: Unremarkable.  Bowel:  Esophagus: The visualized esophagus appears unremarkable.  Stomach: The stomach appears unremarkable.  Duodenum: Unremarkable appearing duodenum.  Small Bowel: The small bowel appears unremarkable.  Colon: Nondistended.  Appendix: The appendix appears unremarkable and is seen on Image 116, Series 2.    Pelvis:  Bladder: The bladder appears unremarkable.  Male:  Prostate gland: The prostate gland appears unremarkable.    Bony structures:  Dorsal Spine: The visualized dorsal spine appears unremarkable. Sacralized L5 is seen.  Bony Pelvis: The visualized bony structures of the pelvis appear unremarkable.                          Wet Read by Valentino Augustine DO (12/03/23 04:41:40, [facility], Emergency Medicine)    Free fluid in the abdomen which appears to mostly centered around the pancreas. No visible pneumoperitoneum.                                     X-Ray Chest AP Portable (Final result)  Result time 12/03/23 07:53:26      Final result by Ranulfo Mckeon MD (12/03/23 07:53:26)                   Impression:      No acute findings.      Electronically signed by: Ranulfo Mckeon  Date:    12/03/2023  Time:    07:53               Narrative:    EXAMINATION:  XR CHEST AP PORTABLE    CLINICAL HISTORY:  chest pain;    COMPARISON:  No priors    FINDINGS:  Frontal view of the chest was obtained. The heart is not enlarged.  The lungs are clear.  Azygous lobe incidentally noted.  No pneumothorax.                        Wet Read by Valentino Augustine DO (12/03/23 04:39:05, [facility], Emergency Medicine)    No dense lobar consolidation.                                       Assessment & Plan:     Acute Pancreatitis  -likely secondary to excessive alcohol intake  -significant abdominal pain with nausea and vomiting  -lipase 1849 on admission    -CT abdomen showed evidence for interstitial pancreatitis  -continue LR at 200 ml/hr  -continue Dilaudid 1 mg q.4 hours for pain control  -advance diet as tolerated     Alcohol abuse  - 24 beers over the weekend  -EtOH less than 10 mg/dL on admission  -last drink was at 11:00 a.m. on 12/2/23   -patient not endorsing daily alcohol intake   -continue multivitamin     CODE STATUS:  Full code  Diet:  Full liquid diet, advance as tolerated   DVT Prophylaxis: Lovenox     Monet Forrester MD  [facility] HO-1

## 2023-12-04 NOTE — PROGRESS NOTES
"Inpatient Nutrition Evaluation    Admit Date: 12/3/2023   Total duration of encounter: 1 day    Nutrition Recommendation/Prescription     Full liquid diet--ADAT to regular diet  Will order boost breeze tid; Boost Breeze (provides 250 kcal, 9 g protein per serving)   MVI/fe  Weekly wt  Will monitor nutrition status     Nutrition Assessment     Chart Review    Reason Seen: continuous nutrition monitoring    Malnutrition Screening Tool Results   Have you recently lost weight without trying?: No  Have you been eating poorly because of a decreased appetite?: No   MST Score: 0     Diagnosis:  Acute pancreatitis/ ETOH abuse     Relevant Medical History: pancreatitis     Nutrition-Related Medications: IVF LR @ 200ml/hr, folic acid, thiamine     Nutrition-Related Labs:  () H/H 13.6/40.8(L) Gluc 93 Bun 5.9 Cr 0.7 Na 137 Amylase 2438(H) Lipase 1849(H)     Diet Order: Diet full liquid  Oral Supplement Order: none  Appetite/Oral Intake: fair/50-75% of meals  Factors Affecting Nutritional Intake: abdominal pain, decreased appetite, nausea, and vomiting  Food/Gnosticism/Cultural Preferences: none reported  Food Allergies: none reported    Skin Integrity: intact  Wound(s):   none    Comments    () Pt reported he usually eats well/ good appetite; pt did report po intake has been down past 1-2 days 2 abdominal pain/ N/V--Dx pancreatitis; symptoms improving. Pt willing to drink oral supplement; will order. No wt loss; hx ETOH abuse. Abnormal labs noted: amylase/lipase elevated.     Anthropometrics    Height: 6' 3" (190.5 cm) Height Method: Stated  Last Weight: 81.6 kg (180 lb) (23 0242) Weight Method: Standard Scale  BMI (Calculated): 22.5  BMI Classification: normal (BMI 18.5-24.9)        Ideal Body Weight (IBW), Male: 196 lb     % Ideal Body Weight, Male (lb): 91.84 %                 Usual Body Weight (UBW), k.6 kg  % Usual Body Weight: 100.27     Usual Weight Provided By: patient and EMR weight history    Wt " Readings from Last 5 Encounters:   12/03/23 81.6 kg (180 lb)   01/11/23 76.6 kg (168 lb 14 oz)     Weight Change(s) Since Admission:  Admit Weight: 81.6 kg (180 lb) (12/03/23 0242)  No wt loss     Patient Education    Not applicable.    Monitoring & Evaluation     Dietitian will monitor food and beverage intake, weight, and glucose/endocrine profile.  Nutrition Risk/Follow-Up: low (follow-up in 5-7 days)  Patients assigned 'low nutrition risk' status do not qualify for a full nutritional assessment but will be monitored and re-evaluated in a 5-7 day time period. Please consult if re-evaluation needed sooner.

## 2023-12-05 VITALS
OXYGEN SATURATION: 98 % | WEIGHT: 180 LBS | RESPIRATION RATE: 18 BRPM | HEIGHT: 75 IN | BODY MASS INDEX: 22.38 KG/M2 | HEART RATE: 65 BPM | TEMPERATURE: 98 F | DIASTOLIC BLOOD PRESSURE: 81 MMHG | SYSTOLIC BLOOD PRESSURE: 134 MMHG

## 2023-12-05 LAB
ALBUMIN SERPL-MCNC: 3 G/DL (ref 3.5–5)
ALBUMIN/GLOB SERPL: 1.1 RATIO (ref 1.1–2)
ALP SERPL-CCNC: 39 UNIT/L (ref 40–150)
ALT SERPL-CCNC: 9 UNIT/L (ref 0–55)
AST SERPL-CCNC: 14 UNIT/L (ref 5–34)
BASOPHILS # BLD AUTO: 0.03 X10(3)/MCL
BASOPHILS NFR BLD AUTO: 0.5 %
BILIRUB SERPL-MCNC: 0.9 MG/DL
BUN SERPL-MCNC: 3.6 MG/DL (ref 8.9–20.6)
CALCIUM SERPL-MCNC: 8.6 MG/DL (ref 8.4–10.2)
CHLORIDE SERPL-SCNC: 102 MMOL/L (ref 98–107)
CO2 SERPL-SCNC: 29 MMOL/L (ref 22–29)
CREAT SERPL-MCNC: 0.75 MG/DL (ref 0.73–1.18)
EOSINOPHIL # BLD AUTO: 0.33 X10(3)/MCL (ref 0–0.9)
EOSINOPHIL NFR BLD AUTO: 5.3 %
ERYTHROCYTE [DISTWIDTH] IN BLOOD BY AUTOMATED COUNT: 12.4 % (ref 11.5–17)
GFR SERPLBLD CREATININE-BSD FMLA CKD-EPI: >60 MLS/MIN/1.73/M2
GLOBULIN SER-MCNC: 2.8 GM/DL (ref 2.4–3.5)
GLUCOSE SERPL-MCNC: 85 MG/DL (ref 74–100)
HCT VFR BLD AUTO: 37.4 % (ref 42–52)
HGB BLD-MCNC: 12.6 G/DL (ref 14–18)
IMM GRANULOCYTES # BLD AUTO: 0.02 X10(3)/MCL (ref 0–0.04)
IMM GRANULOCYTES NFR BLD AUTO: 0.3 %
LYMPHOCYTES # BLD AUTO: 1.33 X10(3)/MCL (ref 0.6–4.6)
LYMPHOCYTES NFR BLD AUTO: 21.4 %
MCH RBC QN AUTO: 31.8 PG (ref 27–31)
MCHC RBC AUTO-ENTMCNC: 33.7 G/DL (ref 33–36)
MCV RBC AUTO: 94.4 FL (ref 80–94)
MONOCYTES # BLD AUTO: 0.79 X10(3)/MCL (ref 0.1–1.3)
MONOCYTES NFR BLD AUTO: 12.7 %
NEUTROPHILS # BLD AUTO: 3.72 X10(3)/MCL (ref 2.1–9.2)
NEUTROPHILS NFR BLD AUTO: 59.8 %
NRBC BLD AUTO-RTO: 0 %
PLATELET # BLD AUTO: 162 X10(3)/MCL (ref 130–400)
PMV BLD AUTO: 11.5 FL (ref 7.4–10.4)
POTASSIUM SERPL-SCNC: 3.9 MMOL/L (ref 3.5–5.1)
PROT SERPL-MCNC: 5.8 GM/DL (ref 6.4–8.3)
RBC # BLD AUTO: 3.96 X10(6)/MCL (ref 4.7–6.1)
SODIUM SERPL-SCNC: 137 MMOL/L (ref 136–145)
WBC # SPEC AUTO: 6.22 X10(3)/MCL (ref 4.5–11.5)

## 2023-12-05 PROCEDURE — 25000003 PHARM REV CODE 250

## 2023-12-05 PROCEDURE — 85025 COMPLETE CBC W/AUTO DIFF WBC: CPT

## 2023-12-05 PROCEDURE — 63600175 PHARM REV CODE 636 W HCPCS

## 2023-12-05 PROCEDURE — 80053 COMPREHEN METABOLIC PANEL: CPT

## 2023-12-05 RX ORDER — AMLODIPINE BESYLATE 10 MG/1
10 TABLET ORAL DAILY
Qty: 60 TABLET | Refills: 6 | Status: SHIPPED | OUTPATIENT
Start: 2023-12-06 | End: 2024-12-05

## 2023-12-05 RX ORDER — AMLODIPINE BESYLATE 10 MG/1
10 TABLET ORAL DAILY
Qty: 60 TABLET | Refills: 6 | Status: SHIPPED | OUTPATIENT
Start: 2023-12-06 | End: 2023-12-05 | Stop reason: SDUPTHER

## 2023-12-05 RX ORDER — AMLODIPINE BESYLATE 10 MG/1
10 TABLET ORAL DAILY
Status: DISCONTINUED | OUTPATIENT
Start: 2023-12-05 | End: 2023-12-05 | Stop reason: HOSPADM

## 2023-12-05 RX ADMIN — THERA TABS 1 TABLET: TAB at 09:12

## 2023-12-05 RX ADMIN — HYDROMORPHONE HYDROCHLORIDE 1 MG: 1 INJECTION, SOLUTION INTRAMUSCULAR; INTRAVENOUS; SUBCUTANEOUS at 04:12

## 2023-12-05 RX ADMIN — HYDROMORPHONE HYDROCHLORIDE 1 MG: 1 INJECTION, SOLUTION INTRAMUSCULAR; INTRAVENOUS; SUBCUTANEOUS at 08:12

## 2023-12-05 RX ADMIN — AMLODIPINE BESYLATE 10 MG: 10 TABLET ORAL at 09:12

## 2023-12-05 NOTE — DISCHARGE SUMMARY
[facility] Discharge Summary    Admitting Physician: Ramon Velasquez MD  Attending Physician: Ramon Velasquez MD  Date of Admit: 12/3/2023  Date of Discharge: 12/5/2023    Condition: Stable  Outcome: Patient tolerated treatment/procedure well without complication and is now ready for discharge.  DISPOSITION: Home or Self Care          Discharge Diagnoses     Patient Active Problem List   Diagnosis    Epigastric pain    Acute pancreatitis       Principal Problem:  Acute pancreatitis    Consultants and Procedures     Consultants:  IP CONSULT TO HOSPITAL MEDICINE    Procedures:   * No surgery found *     Brief Admission History      Osei Wellington is a 28 y.o.  male with a history of binge drinking who presented on 12/3/2023  with c/o abdominal pain onset 1 day ago.  Pain described as sharp, upper abdominal, 12/10, and radiating to his back.  Endorses some nausea and vomiting.  Explains that at around 11:00 a.m. he began to feel off in his stomach and by 11:00 p.m. he was in significant pain.  11:00 a.m. symptoms were preceded by binge drinking episode featuring 24 beers over the weekend.  Patient has had pancreatitis 3 years ago and 18 months ago.  States that his symptoms now are the same as on those 2 prior occasions.  He denies any headache, fevers, chills, chest pain, shortness for breath, or diarrhea.     In the ED, heart rate was 68, BP was 171/97, and he was afebrile.  White count was 12.64, lipase was 1849, and amylase of 2438.  BUN was in normal limits.  CT abdomen pelvis show evidence of acute interstitial pancreatitis.  He was given 4 mg of morphine without significant relief.  The pain improved to 6/10 with 1 mg of Dilaudid.  Patient to be admitted for acute pancreatitis.    Hospital Course with Pertinent Findings     Patient was initiated on IVF for resuscitation and was started on pain regimen for pain control.  Diet was advanced as tolerated and patient continued to improve  "symptomatically over course of hospitalization.  Blood pressure was noted to be significantly elevated throughout admission and he was started on Amlodipine 5mg which was titrated up to 10mg with good response.  He was able to tolerate regular diet with significant improvement in pain and nausea.  He was deemed stable for discharge with instructions to continue outpatient f/u.      Discharge physical exam:  Vitals  BP: 134/81  Temp: 98.2 °F (36.8 °C)  Temp Source: Oral  Pulse: 65  Resp: 18  SpO2: 98 %  Height: 6' 3" (190.5 cm)  Weight: 81.6 kg (180 lb)    General: Patient resting comfortably in bed, in no acute distress   Eye: PERRLA, EOMI, clear conjunctiva, eyelids normal  HENT: Head-normocephalic and atraumatic  Neck: full range of motion, no thyromegaly or lymphadenopathy, trachea midline, supple, no palpable thyroid nodules  Respiratory: clear to auscultation bilaterally without wheezes, rales, rhonchi  Cardiovascular: regular rate and rhythm without murmurs.  No gallops or rubs no JVD.  Capillary refill within normal limits.  Gastrointestinal: soft, non-tender, non-distended with normal bowel sounds, without masses to palpation  Genitourinary: no CVA tenderness to palpation  Musculoskeletal: full range of motion of all extremities/spine without limitation or discomfort  Integumentary: no rashes or skin lesions present  Neurologic: no signs of peripheral neurological deficit, motor/sensory function intact  Psychiatric:  alert and oriented, cognitive function intact, cooperative with exam, good eye contact, judgement and insight intact, mood and affect full range.     TIME SPENT ON DISCHARGE: 60 minutes    Discharge Medications        Medication List        START taking these medications      amLODIPine 10 MG tablet  Commonly known as: NORVASC  Take 1 tablet (10 mg total) by mouth once daily.  Start taking on: December 6, 2023               Where to Get Your Medications        [facility] " [id number] - [place] - [address] AT [facility] [address]  [address] [id number]      Phone: [phone]   amLODIPine 10 MG tablet         Discharge Information:     Discharge plan discussed with attending physician.  Patient was counseled on importance of alcohol cessation and consequences associated with continued alcohol consumption.  He was instructed to continue blood pressure medication upon discharge and was also instructed to establish with PCP once he is able to get health insurance.  He was given strict ED precautions to return if symptoms worsen.      Monet Forrester MD  [facility] Internal Medicine, PGY-2

## 2023-12-08 LAB
BACTERIA BLD CULT: NORMAL
BACTERIA BLD CULT: NORMAL

## 2024-11-04 ENCOUNTER — HOSPITAL ENCOUNTER (INPATIENT)
Facility: HOSPITAL | Age: 29
LOS: 3 days | Discharge: HOME | DRG: 440 | End: 2024-11-07
Attending: EMERGENCY MEDICINE | Admitting: INTERNAL MEDICINE
Payer: MEDICAID

## 2024-11-04 DIAGNOSIS — K85.20 ALCOHOL-INDUCED ACUTE PANCREATITIS WITHOUT INFECTION OR NECROSIS: Primary | ICD-10-CM

## 2024-11-04 LAB
ALBUMIN SERPL-MCNC: 3.6 G/DL (ref 3.5–5)
ALBUMIN SERPL-MCNC: 4.2 G/DL (ref 3.5–5)
ALBUMIN/GLOB SERPL: 1.2 RATIO (ref 1.1–2)
ALBUMIN/GLOB SERPL: 1.3 RATIO (ref 1.1–2)
ALP SERPL-CCNC: 81 UNIT/L (ref 40–150)
ALP SERPL-CCNC: 93 UNIT/L (ref 40–150)
ALT SERPL-CCNC: 120 UNIT/L (ref 0–55)
ALT SERPL-CCNC: 96 UNIT/L (ref 0–55)
AMPHET UR QL SCN: NEGATIVE
AMYLASE SERPL-CCNC: 969 UNIT/L (ref 25–125)
ANION GAP SERPL CALC-SCNC: 14 MEQ/L
ANION GAP SERPL CALC-SCNC: 9 MEQ/L
AST SERPL-CCNC: 69 UNIT/L (ref 5–34)
AST SERPL-CCNC: 92 UNIT/L (ref 5–34)
BACTERIA #/AREA URNS AUTO: ABNORMAL /HPF
BARBITURATE SCN PRESENT UR: NEGATIVE
BASOPHILS # BLD AUTO: 0.03 X10(3)/MCL
BASOPHILS # BLD AUTO: 0.06 X10(3)/MCL
BASOPHILS NFR BLD AUTO: 0.2 %
BASOPHILS NFR BLD AUTO: 0.4 %
BENZODIAZ UR QL SCN: NEGATIVE
BILIRUB SERPL-MCNC: 0.9 MG/DL
BILIRUB SERPL-MCNC: 1.2 MG/DL
BILIRUB UR QL STRIP.AUTO: ABNORMAL
BUN SERPL-MCNC: 10.5 MG/DL (ref 8.9–20.6)
BUN SERPL-MCNC: 11.3 MG/DL (ref 8.9–20.6)
CALCIUM SERPL-MCNC: 8.6 MG/DL (ref 8.4–10.2)
CALCIUM SERPL-MCNC: 9.7 MG/DL (ref 8.4–10.2)
CANNABINOIDS UR QL SCN: POSITIVE
CHLORIDE SERPL-SCNC: 101 MMOL/L (ref 98–107)
CHLORIDE SERPL-SCNC: 104 MMOL/L (ref 98–107)
CHOLEST SERPL-MCNC: 204 MG/DL
CHOLEST/HDLC SERPL: 2 {RATIO} (ref 0–5)
CLARITY UR: ABNORMAL
CO2 SERPL-SCNC: 18 MMOL/L (ref 22–29)
CO2 SERPL-SCNC: 24 MMOL/L (ref 22–29)
COCAINE UR QL SCN: NEGATIVE
COLOR UR AUTO: ABNORMAL
CREAT SERPL-MCNC: 0.82 MG/DL (ref 0.72–1.25)
CREAT SERPL-MCNC: 0.86 MG/DL (ref 0.72–1.25)
CREAT/UREA NIT SERPL: 13
CREAT/UREA NIT SERPL: 13
EOSINOPHIL # BLD AUTO: 0.01 X10(3)/MCL (ref 0–0.9)
EOSINOPHIL # BLD AUTO: 0.04 X10(3)/MCL (ref 0–0.9)
EOSINOPHIL NFR BLD AUTO: 0.1 %
EOSINOPHIL NFR BLD AUTO: 0.2 %
ERYTHROCYTE [DISTWIDTH] IN BLOOD BY AUTOMATED COUNT: 13.9 % (ref 11.5–17)
ERYTHROCYTE [DISTWIDTH] IN BLOOD BY AUTOMATED COUNT: 14.2 % (ref 11.5–17)
EST. AVERAGE GLUCOSE BLD GHB EST-MCNC: 93.9 MG/DL
ETHANOL SERPL-MCNC: <10 MG/DL
FENTANYL UR QL SCN: NEGATIVE
GFR SERPLBLD CREATININE-BSD FMLA CKD-EPI: >60 ML/MIN/1.73/M2
GFR SERPLBLD CREATININE-BSD FMLA CKD-EPI: >60 ML/MIN/1.73/M2
GLOBULIN SER-MCNC: 2.7 GM/DL (ref 2.4–3.5)
GLOBULIN SER-MCNC: 3.4 GM/DL (ref 2.4–3.5)
GLUCOSE SERPL-MCNC: 116 MG/DL (ref 74–100)
GLUCOSE SERPL-MCNC: 149 MG/DL (ref 74–100)
GLUCOSE UR QL STRIP: ABNORMAL
HBA1C MFR BLD: 4.9 %
HCT VFR BLD AUTO: 45.6 % (ref 42–52)
HCT VFR BLD AUTO: 50.1 % (ref 42–52)
HDLC SERPL-MCNC: 96 MG/DL (ref 35–60)
HGB BLD-MCNC: 15.7 G/DL (ref 14–18)
HGB BLD-MCNC: 17.7 G/DL (ref 14–18)
HGB UR QL STRIP: NEGATIVE
HOLD SPECIMEN: NORMAL
HOLD SPECIMEN: NORMAL
HYALINE CASTS #/AREA URNS LPF: ABNORMAL /LPF
IMM GRANULOCYTES # BLD AUTO: 0.06 X10(3)/MCL (ref 0–0.04)
IMM GRANULOCYTES # BLD AUTO: 0.07 X10(3)/MCL (ref 0–0.04)
IMM GRANULOCYTES NFR BLD AUTO: 0.4 %
IMM GRANULOCYTES NFR BLD AUTO: 0.5 %
KETONES UR QL STRIP: ABNORMAL
LACTATE SERPL-SCNC: 1 MMOL/L (ref 0.5–2.2)
LACTATE SERPL-SCNC: 2.2 MMOL/L (ref 0.5–2.2)
LDH SERPL-CCNC: 252 U/L (ref 125–220)
LDLC SERPL CALC-MCNC: 92 MG/DL (ref 50–140)
LEUKOCYTE ESTERASE UR QL STRIP: NEGATIVE
LIPASE SERPL-CCNC: 2015 U/L
LYMPHOCYTES # BLD AUTO: 0.39 X10(3)/MCL (ref 0.6–4.6)
LYMPHOCYTES # BLD AUTO: 0.66 X10(3)/MCL (ref 0.6–4.6)
LYMPHOCYTES NFR BLD AUTO: 3 %
LYMPHOCYTES NFR BLD AUTO: 4.1 %
MAGNESIUM SERPL-MCNC: 1.7 MG/DL (ref 1.6–2.6)
MAGNESIUM SERPL-MCNC: 1.8 MG/DL (ref 1.6–2.6)
MCH RBC QN AUTO: 32.2 PG (ref 27–31)
MCH RBC QN AUTO: 32.5 PG (ref 27–31)
MCHC RBC AUTO-ENTMCNC: 34.4 G/DL (ref 33–36)
MCHC RBC AUTO-ENTMCNC: 35.3 G/DL (ref 33–36)
MCV RBC AUTO: 92.1 FL (ref 80–94)
MCV RBC AUTO: 93.6 FL (ref 80–94)
MDMA UR QL SCN: NEGATIVE
MONOCYTES # BLD AUTO: 0.64 X10(3)/MCL (ref 0.1–1.3)
MONOCYTES # BLD AUTO: 0.82 X10(3)/MCL (ref 0.1–1.3)
MONOCYTES NFR BLD AUTO: 4.9 %
MONOCYTES NFR BLD AUTO: 5 %
MUCOUS THREADS URNS QL MICRO: ABNORMAL /LPF
NEUTROPHILS # BLD AUTO: 12.02 X10(3)/MCL (ref 2.1–9.2)
NEUTROPHILS # BLD AUTO: 14.59 X10(3)/MCL (ref 2.1–9.2)
NEUTROPHILS NFR BLD AUTO: 89.9 %
NEUTROPHILS NFR BLD AUTO: 91.3 %
NITRITE UR QL STRIP: NEGATIVE
NRBC BLD AUTO-RTO: 0 %
NRBC BLD AUTO-RTO: 0 %
OPIATES UR QL SCN: NEGATIVE
PCP UR QL: NEGATIVE
PH UR STRIP: 6.5 [PH]
PH UR: 6.5 [PH] (ref 3–11)
PHOSPHATE SERPL-MCNC: 3.4 MG/DL (ref 2.3–4.7)
PLATELET # BLD AUTO: 182 X10(3)/MCL (ref 130–400)
PLATELET # BLD AUTO: 219 X10(3)/MCL (ref 130–400)
PMV BLD AUTO: 11.7 FL (ref 7.4–10.4)
PMV BLD AUTO: 11.9 FL (ref 7.4–10.4)
POTASSIUM SERPL-SCNC: 4.1 MMOL/L (ref 3.5–5.1)
POTASSIUM SERPL-SCNC: 4.2 MMOL/L (ref 3.5–5.1)
PROT SERPL-MCNC: 6.3 GM/DL (ref 6.4–8.3)
PROT SERPL-MCNC: 7.6 GM/DL (ref 6.4–8.3)
PROT UR QL STRIP: ABNORMAL
RBC # BLD AUTO: 4.87 X10(6)/MCL (ref 4.7–6.1)
RBC # BLD AUTO: 5.44 X10(6)/MCL (ref 4.7–6.1)
RBC #/AREA URNS AUTO: ABNORMAL /HPF
SODIUM SERPL-SCNC: 133 MMOL/L (ref 136–145)
SODIUM SERPL-SCNC: 137 MMOL/L (ref 136–145)
SP GR UR STRIP.AUTO: 1.05 (ref 1–1.03)
SPECIFIC GRAVITY, URINE AUTO (.000) (OHS): 1.05 (ref 1–1.03)
SQUAMOUS #/AREA URNS LPF: ABNORMAL /HPF
TRIGL SERPL-MCNC: 80 MG/DL (ref 34–140)
UROBILINOGEN UR STRIP-ACNC: ABNORMAL
VLDLC SERPL CALC-MCNC: 16 MG/DL
WBC # BLD AUTO: 13.15 X10(3)/MCL (ref 4.5–11.5)
WBC # BLD AUTO: 16.24 X10(3)/MCL (ref 4.5–11.5)
WBC #/AREA URNS AUTO: ABNORMAL /HPF

## 2024-11-04 PROCEDURE — 96375 TX/PRO/DX INJ NEW DRUG ADDON: CPT

## 2024-11-04 PROCEDURE — 83690 ASSAY OF LIPASE: CPT | Performed by: EMERGENCY MEDICINE

## 2024-11-04 PROCEDURE — 25000003 PHARM REV CODE 250

## 2024-11-04 PROCEDURE — 25500020 PHARM REV CODE 255

## 2024-11-04 PROCEDURE — 96376 TX/PRO/DX INJ SAME DRUG ADON: CPT

## 2024-11-04 PROCEDURE — 82077 ASSAY SPEC XCP UR&BREATH IA: CPT | Performed by: EMERGENCY MEDICINE

## 2024-11-04 PROCEDURE — 63600175 PHARM REV CODE 636 W HCPCS

## 2024-11-04 PROCEDURE — 63600175 PHARM REV CODE 636 W HCPCS: Performed by: EMERGENCY MEDICINE

## 2024-11-04 PROCEDURE — A4216 STERILE WATER/SALINE, 10 ML: HCPCS

## 2024-11-04 PROCEDURE — 83615 LACTATE (LD) (LDH) ENZYME: CPT

## 2024-11-04 PROCEDURE — 81001 URINALYSIS AUTO W/SCOPE: CPT | Mod: XB | Performed by: EMERGENCY MEDICINE

## 2024-11-04 PROCEDURE — 83605 ASSAY OF LACTIC ACID: CPT | Performed by: EMERGENCY MEDICINE

## 2024-11-04 PROCEDURE — 36415 COLL VENOUS BLD VENIPUNCTURE: CPT

## 2024-11-04 PROCEDURE — 99285 EMERGENCY DEPT VISIT HI MDM: CPT | Mod: 25

## 2024-11-04 PROCEDURE — 96374 THER/PROPH/DIAG INJ IV PUSH: CPT

## 2024-11-04 PROCEDURE — 11000001 HC ACUTE MED/SURG PRIVATE ROOM

## 2024-11-04 PROCEDURE — 83735 ASSAY OF MAGNESIUM: CPT

## 2024-11-04 PROCEDURE — 80053 COMPREHEN METABOLIC PANEL: CPT

## 2024-11-04 PROCEDURE — 96361 HYDRATE IV INFUSION ADD-ON: CPT

## 2024-11-04 PROCEDURE — 85025 COMPLETE CBC W/AUTO DIFF WBC: CPT | Performed by: EMERGENCY MEDICINE

## 2024-11-04 PROCEDURE — 82150 ASSAY OF AMYLASE: CPT

## 2024-11-04 PROCEDURE — 84100 ASSAY OF PHOSPHORUS: CPT

## 2024-11-04 PROCEDURE — 80307 DRUG TEST PRSMV CHEM ANLYZR: CPT | Performed by: EMERGENCY MEDICINE

## 2024-11-04 PROCEDURE — 83036 HEMOGLOBIN GLYCOSYLATED A1C: CPT

## 2024-11-04 PROCEDURE — 25000003 PHARM REV CODE 250: Performed by: EMERGENCY MEDICINE

## 2024-11-04 PROCEDURE — 80053 COMPREHEN METABOLIC PANEL: CPT | Performed by: EMERGENCY MEDICINE

## 2024-11-04 PROCEDURE — 80061 LIPID PANEL: CPT

## 2024-11-04 PROCEDURE — 83735 ASSAY OF MAGNESIUM: CPT | Performed by: EMERGENCY MEDICINE

## 2024-11-04 PROCEDURE — 85025 COMPLETE CBC W/AUTO DIFF WBC: CPT

## 2024-11-04 RX ORDER — ONDANSETRON HYDROCHLORIDE 2 MG/ML
4 INJECTION, SOLUTION INTRAVENOUS
Status: COMPLETED | OUTPATIENT
Start: 2024-11-04 | End: 2024-11-04

## 2024-11-04 RX ORDER — MORPHINE SULFATE 10 MG/ML
10 INJECTION INTRAMUSCULAR; INTRAVENOUS; SUBCUTANEOUS
Status: COMPLETED | OUTPATIENT
Start: 2024-11-04 | End: 2024-11-04

## 2024-11-04 RX ORDER — TALC
6 POWDER (GRAM) TOPICAL NIGHTLY PRN
Status: DISCONTINUED | OUTPATIENT
Start: 2024-11-04 | End: 2024-11-07 | Stop reason: HOSPADM

## 2024-11-04 RX ORDER — HYDROMORPHONE HYDROCHLORIDE 1 MG/ML
1 INJECTION, SOLUTION INTRAMUSCULAR; INTRAVENOUS; SUBCUTANEOUS EVERY 4 HOURS PRN
Status: DISCONTINUED | OUTPATIENT
Start: 2024-11-04 | End: 2024-11-05

## 2024-11-04 RX ORDER — AMLODIPINE BESYLATE 10 MG/1
10 TABLET ORAL DAILY
Status: DISCONTINUED | OUTPATIENT
Start: 2024-11-04 | End: 2024-11-07 | Stop reason: HOSPADM

## 2024-11-04 RX ORDER — DIPHENHYDRAMINE HYDROCHLORIDE 50 MG/ML
25 INJECTION INTRAMUSCULAR; INTRAVENOUS
Status: COMPLETED | OUTPATIENT
Start: 2024-11-04 | End: 2024-11-04

## 2024-11-04 RX ORDER — LABETALOL HCL 20 MG/4 ML
10 SYRINGE (ML) INTRAVENOUS EVERY 4 HOURS PRN
Status: DISCONTINUED | OUTPATIENT
Start: 2024-11-04 | End: 2024-11-07 | Stop reason: HOSPADM

## 2024-11-04 RX ORDER — ONDANSETRON HYDROCHLORIDE 2 MG/ML
4 INJECTION, SOLUTION INTRAVENOUS EVERY 8 HOURS PRN
Status: DISCONTINUED | OUTPATIENT
Start: 2024-11-04 | End: 2024-11-07 | Stop reason: HOSPADM

## 2024-11-04 RX ORDER — SODIUM CHLORIDE, SODIUM LACTATE, POTASSIUM CHLORIDE, CALCIUM CHLORIDE 600; 310; 30; 20 MG/100ML; MG/100ML; MG/100ML; MG/100ML
INJECTION, SOLUTION INTRAVENOUS CONTINUOUS
Status: DISCONTINUED | OUTPATIENT
Start: 2024-11-04 | End: 2024-11-04

## 2024-11-04 RX ORDER — LORAZEPAM 1 MG/1
2 TABLET ORAL EVERY 4 HOURS PRN
Status: DISCONTINUED | OUTPATIENT
Start: 2024-11-04 | End: 2024-11-07 | Stop reason: HOSPADM

## 2024-11-04 RX ORDER — THIAMINE HCL 100 MG
100 TABLET ORAL DAILY
Status: DISCONTINUED | OUTPATIENT
Start: 2024-11-04 | End: 2024-11-07 | Stop reason: HOSPADM

## 2024-11-04 RX ORDER — CHLORDIAZEPOXIDE HYDROCHLORIDE 25 MG/1
50 CAPSULE, GELATIN COATED ORAL
Status: COMPLETED | OUTPATIENT
Start: 2024-11-04 | End: 2024-11-04

## 2024-11-04 RX ORDER — SODIUM CHLORIDE 0.9 % (FLUSH) 0.9 %
10 SYRINGE (ML) INJECTION
Status: DISCONTINUED | OUTPATIENT
Start: 2024-11-04 | End: 2024-11-07 | Stop reason: HOSPADM

## 2024-11-04 RX ORDER — MORPHINE SULFATE 2 MG/ML
2 INJECTION, SOLUTION INTRAMUSCULAR; INTRAVENOUS EVERY 4 HOURS PRN
Status: DISCONTINUED | OUTPATIENT
Start: 2024-11-04 | End: 2024-11-04

## 2024-11-04 RX ORDER — ENOXAPARIN SODIUM 100 MG/ML
40 INJECTION SUBCUTANEOUS EVERY 24 HOURS
Status: DISCONTINUED | OUTPATIENT
Start: 2024-11-04 | End: 2024-11-07 | Stop reason: HOSPADM

## 2024-11-04 RX ORDER — MORPHINE SULFATE 2 MG/ML
6 INJECTION, SOLUTION INTRAMUSCULAR; INTRAVENOUS
Status: COMPLETED | OUTPATIENT
Start: 2024-11-04 | End: 2024-11-04

## 2024-11-04 RX ORDER — FOLIC ACID 1 MG/1
1 TABLET ORAL DAILY
Status: DISCONTINUED | OUTPATIENT
Start: 2024-11-04 | End: 2024-11-07 | Stop reason: HOSPADM

## 2024-11-04 RX ORDER — HYDRALAZINE HYDROCHLORIDE 20 MG/ML
10 INJECTION INTRAMUSCULAR; INTRAVENOUS EVERY 4 HOURS PRN
Status: DISCONTINUED | OUTPATIENT
Start: 2024-11-04 | End: 2024-11-07

## 2024-11-04 RX ORDER — MORPHINE SULFATE 2 MG/ML
2 INJECTION, SOLUTION INTRAMUSCULAR; INTRAVENOUS EVERY 4 HOURS
Status: DISCONTINUED | OUTPATIENT
Start: 2024-11-04 | End: 2024-11-04

## 2024-11-04 RX ORDER — SODIUM CHLORIDE 9 MG/ML
INJECTION, SOLUTION INTRAVENOUS CONTINUOUS
Status: DISCONTINUED | OUTPATIENT
Start: 2024-11-04 | End: 2024-11-05

## 2024-11-04 RX ADMIN — SODIUM CHLORIDE 2448 ML: 9 INJECTION, SOLUTION INTRAVENOUS at 01:11

## 2024-11-04 RX ADMIN — SODIUM CHLORIDE, POTASSIUM CHLORIDE, SODIUM LACTATE AND CALCIUM CHLORIDE: 600; 310; 30; 20 INJECTION, SOLUTION INTRAVENOUS at 05:11

## 2024-11-04 RX ADMIN — ONDANSETRON 4 MG: 2 INJECTION INTRAMUSCULAR; INTRAVENOUS at 11:11

## 2024-11-04 RX ADMIN — FOLIC ACID 1 MG: 1 TABLET ORAL at 08:11

## 2024-11-04 RX ADMIN — IOHEXOL 100 ML: 350 INJECTION, SOLUTION INTRAVENOUS at 02:11

## 2024-11-04 RX ADMIN — HYDROMORPHONE HYDROCHLORIDE 1 MG: 1 INJECTION, SOLUTION INTRAMUSCULAR; INTRAVENOUS; SUBCUTANEOUS at 09:11

## 2024-11-04 RX ADMIN — Medication 10 ML: at 04:11

## 2024-11-04 RX ADMIN — SODIUM CHLORIDE: 9 INJECTION, SOLUTION INTRAVENOUS at 08:11

## 2024-11-04 RX ADMIN — LORAZEPAM 2 MG: 1 TABLET ORAL at 12:11

## 2024-11-04 RX ADMIN — HYDROMORPHONE HYDROCHLORIDE 1 MG: 1 INJECTION, SOLUTION INTRAMUSCULAR; INTRAVENOUS; SUBCUTANEOUS at 11:11

## 2024-11-04 RX ADMIN — MORPHINE SULFATE 2 MG: 2 INJECTION, SOLUTION INTRAMUSCULAR; INTRAVENOUS at 07:11

## 2024-11-04 RX ADMIN — LABETALOL HYDROCHLORIDE 10 MG: 5 INJECTION, SOLUTION INTRAVENOUS at 03:11

## 2024-11-04 RX ADMIN — CHLORDIAZEPOXIDE HYDROCHLORIDE 50 MG: 25 CAPSULE ORAL at 04:11

## 2024-11-04 RX ADMIN — ONDANSETRON 4 MG: 2 INJECTION INTRAMUSCULAR; INTRAVENOUS at 02:11

## 2024-11-04 RX ADMIN — THIAMINE HCL TAB 100 MG 100 MG: 100 TAB at 08:11

## 2024-11-04 RX ADMIN — MORPHINE SULFATE 10 MG: 10 INJECTION, SOLUTION INTRAMUSCULAR; INTRAVENOUS at 02:11

## 2024-11-04 RX ADMIN — DIPHENHYDRAMINE HYDROCHLORIDE 25 MG: 50 INJECTION INTRAMUSCULAR; INTRAVENOUS at 01:11

## 2024-11-04 RX ADMIN — SODIUM CHLORIDE: 9 INJECTION, SOLUTION INTRAVENOUS at 01:11

## 2024-11-04 RX ADMIN — HYDROMORPHONE HYDROCHLORIDE 1 MG: 1 INJECTION, SOLUTION INTRAMUSCULAR; INTRAVENOUS; SUBCUTANEOUS at 03:11

## 2024-11-04 RX ADMIN — HYDRALAZINE HYDROCHLORIDE 10 MG: 20 INJECTION INTRAMUSCULAR; INTRAVENOUS at 04:11

## 2024-11-04 RX ADMIN — MORPHINE SULFATE 6 MG: 2 INJECTION, SOLUTION INTRAMUSCULAR; INTRAVENOUS at 01:11

## 2024-11-04 RX ADMIN — ENOXAPARIN SODIUM 40 MG: 40 INJECTION SUBCUTANEOUS at 05:11

## 2024-11-04 RX ADMIN — AMLODIPINE BESYLATE 10 MG: 10 TABLET ORAL at 11:11

## 2024-11-04 RX ADMIN — THERA TABS 1 TABLET: TAB at 12:11

## 2024-11-04 NOTE — PLAN OF CARE
11/04/24 1438   Discharge Assessment   Assessment Type Discharge Planning Assessment   Confirmed/corrected address, phone number and insurance Yes   Confirmed Demographics Correct on Facesheet   Source of Information patient   When was your last doctors appointment?   (No Pcp)   Reason For Admission Alcohol induced acute pancreatitis, N/V   People in Home significant other  (Donald Omalley (Spouse)  [phone])   Facility Arrived From: home   Do you expect to return to your current living situation? Yes   Do you have help at home or someone to help you manage your care at home? Yes   Who are your caregiver(s) and their phone number(s)? Donald Omalley (Spouse)  [phone]   Prior to hospitilization cognitive status: Alert/Oriented;No Deficits   Current cognitive status: Alert/Oriented;No Deficits   Walking or Climbing Stairs Difficulty no   Dressing/Bathing Difficulty no   Equipment Currently Used at Home none   Readmission within 30 days? No   Patient currently being followed by outpatient case management? No   Do you currently have service(s) that help you manage your care at home? No   Do you take prescription medications? Yes   Do you have prescription coverage? No  (Medicaid pending)   Do you have any problems affording any of your prescribed medications? No   Is the patient taking medications as prescribed? yes   Who is going to help you get home at discharge? Donald Omalley (Spouse)  [phone]   How do you get to doctors appointments? family or friend will provide   Are you on dialysis? No   Do you take coumadin? No   Discharge Plan A Home with family   DME Needed Upon Discharge  none   Discharge Plan discussed with: Patient   Transition of Care Barriers Underinsured   Physical Activity   On average, how many days per week do you engage in moderate to strenuous exercise (like a brisk walk)? 0 days   On average, how many minutes do you engage in exercise at this level? 0 min   Financial Resource  Strain   How hard is it for you to pay for the very basics like food, housing, medical care, and heating? Not hard   Housing Stability   In the last 12 months, was there a time when you were not able to pay the mortgage or rent on time? N   At any time in the past 12 months, were you homeless or living in a shelter (including now)? N   Transportation Needs   Has the lack of transportation kept you from medical appointments, meetings, work or from getting things needed for daily living? No   Food Insecurity   Within the past 12 months, you worried that your food would run out before you got the money to buy more. Never true   Within the past 12 months, the food you bought just didn't last and you didn't have money to get more. Never true   Stress   Do you feel stress - tense, restless, nervous, or anxious, or unable to sleep at night because your mind is troubled all the time - these days? Not at all   Social Isolation   How often do you feel lonely or isolated from those around you?  Never   Alcohol Use   Q1: How often do you have a drink containing alcohol? 4 or more ti   Q2: How many drinks containing alcohol do you have on a typical day when you are drinking? 5 or 6   Q3: How often do you have six or more drinks on one occasion? Daily   Utilities   In the past 12 months has the electric, gas, oil, or water company threatened to shut off services in your home? No   Health Literacy   How often do you need to have someone help you when you read instructions, pamphlets, or other written material from your doctor or pharmacy? Never   OTHER   Name(s) of People in Home Donald Omalley (Spouse)  [phone], 2 dependent children

## 2024-11-04 NOTE — H&P
[facility] History and Physical Note      Resident Team: [facility]  Attending Physician: Radha Vázquez MD  Resident: Keke    Date of Admit: 11/4/2024  Chief Complaint   Abdominal Pain, Nausea, and Vomiting     HPI   Osei Wellington is a 29 y.o. male with PMH significant for HTN and h/o alcohol use presented to ED on 11/4/2024  with a primary complaint of Abdominal Pain, Nausea, and Vomiting  Pt states his symptoms began 11/3 around 2pm in the afternoon. Tried to eat one of his son's candy bars and felt instantly nauseous. Began having 10/10 abdominal pain right in the upper center of his abdomen as well as nausea. Thought he could get through the symptoms at home however the nausea and abdominal pain became too much and he decided to come into the ED.  Admits to recent alcohol binge on 11/2; says he drank 3 beatboxes. Daily drinker; last episode of pancreatitis was about 12/2023. Also admits to occasional marijuana use.    ED Course - Vital signs on admission he was AF, HR 77, hypertensive 184/128, and O2 sat 100% on room air. Lab work showed leukocytosis up to 16.24, bicarb 18, Transaminitis, elevated lipase 2015. Lactic level wnl. UDS +Cannabinoids. CT abdomen and pelvis concerning for acute pancreatitis. He was given Zofran 4mg, Saline bolus and Morphine 16mg. Pain still present so ED physician ordered Librium. Internal Medicine consulted for admission of acute pancreatitis.     History    PMH:  Past Medical History:   Diagnosis Date    Pancreatitis 2020     Past Surgical History: History reviewed. No pertinent surgical history.  Family History: No family history on file.  Social:   Social History     Tobacco Use    Smoking status: Never    Smokeless tobacco: Never   Substance Use Topics    Alcohol use: Yes     Comment: Binge drinker    Drug use: Yes     Types: Marijuana     Comment: Vape     Allergies: Review of patient's allergies indicates:  No Known Allergies  Home Medications  This patient does have evidence of infective focus  My overall impression is sepsis.  Source: Respiratory  Antibiotics given-   Antibiotics (72h ago, onward)      Start     Stop Route Frequency Ordered    01/04/24 1815  ampicillin-sulbactam (UNASYN) 3 g in sodium chloride 0.9 % 100 mL IVPB (MB+)         01/09/24 1814 IV Every 6 hours (non-standard times) 01/04/24 1707    01/04/24 1815  levoFLOXacin 750 mg/150 mL IVPB 750 mg         01/09/24 1814 IV Every 24 hours (non-standard times) 01/04/24 1707          Latest lactate reviewed-  Recent Labs   Lab 01/04/24  1822 01/04/24  2102 01/04/24  2338   LACTATE 3.1* 2.3* 1.7       Organ dysfunction indicated by Acute respiratory failure    Fluid challenge Not needed - patient is not hypotensive      Post- resuscitation assessment No - Post resuscitation assessment not needed       Will Not start Pressors- Levophed for MAP of 65  Source control achieved by:     Patient volume overload, hold fluid for now  Abx  Cultures  Lactate    Continue abx  Continue abx  Continue abx     "  Prior to Admission medications    Medication Sig Start Date End Date Taking? Authorizing Provider   amLODIPine (NORVASC) 10 MG tablet Take 1 tablet (10 mg total) by mouth once daily. 23  Monet Forrester MD       Review of Systems   Review of Systems   Constitutional:  Positive for malaise/fatigue. Negative for chills and fever.   Eyes:  Negative for blurred vision.   Respiratory:  Negative for shortness of breath.    Cardiovascular:  Negative for chest pain and palpitations.   Gastrointestinal:  Positive for abdominal pain, diarrhea, nausea and vomiting.   Neurological:  Positive for weakness. Negative for headaches.      Vital Signs    BP  Min: 180/112  Max: 224/118  Temp  Av.4 °F (36.3 °C)  Min: 97.4 °F (36.3 °C)  Max: 97.4 °F (36.3 °C)  Pulse  Av.7  Min: 60  Max: 77  Resp  Av  Min: 18  Max: 18  SpO2  Av.3 %  Min: 98 %  Max: 100 %  Height  Av' 2" (188 cm)  Min: 6' 2" (188 cm)  Max: 6' 2" (188 cm)  Weight  Av.6 kg (180 lb)  Min: 81.6 kg (180 lb)  Max: 81.6 kg (180 lb)  Body mass index is 23.11 kg/m².  No intake/output data recorded.    Physical Exam    Physical Exam  Vitals and nursing note reviewed.   Constitutional:       Appearance: He is ill-appearing. He is not diaphoretic.   Cardiovascular:      Rate and Rhythm: Normal rate and regular rhythm.      Heart sounds: No murmur heard.  Pulmonary:      Effort: Pulmonary effort is normal. No respiratory distress.      Breath sounds: No wheezing or rhonchi.   Abdominal:      General: Bowel sounds are normal.      Palpations: There is no mass.      Tenderness: There is abdominal tenderness. There is no rebound.      Comments: Mid-epigastric tenderness present. No tenderness present in BLQ.    Musculoskeletal:      Right lower leg: No edema.      Left lower leg: No edema.   Neurological:      Mental Status: He is alert.       Labs    Most Recent Data:  CBC:   Lab Results   Component Value Date    WBC 16.24 (H) 2024    " HGB 17.7 11/04/2024    HCT 50.1 11/04/2024     11/04/2024    MCV 92.1 11/04/2024    RDW 13.9 11/04/2024     WBC Differential:   Recent Labs   Lab 11/04/24  0119   WBC 16.24*   HGB 17.7   HCT 50.1      MCV 92.1     BMP:   Lab Results   Component Value Date     (L) 11/04/2024    K 4.1 11/04/2024     11/04/2024    CO2 18 (L) 11/04/2024    BUN 11.3 11/04/2024    CREATININE 0.86 11/04/2024    CALCIUM 9.7 11/04/2024    MG 1.80 11/04/2024    PHOS 2.9 12/03/2023     LFTs:   Lab Results   Component Value Date    ALBUMIN 4.2 11/04/2024    BILITOT 1.2 11/04/2024    AST 92 (H) 11/04/2024    ALKPHOS 93 11/04/2024     (H) 11/04/2024     FLP:   Lab Results   Component Value Date    CHOL 162 01/11/2023    HDL 65 (H) 01/11/2023    TRIG 129 01/11/2023     DM:   Lab Results   Component Value Date    HGBA1C 5.0 04/19/2021    CREATININE 0.86 11/04/2024     Urinalysis:   Lab Results   Component Value Date    COLORU Light-Orange (A) 11/04/2024    NITRITE Negative 11/04/2024    KETONESU Trace (A) 04/18/2021    UROBILINOGEN 1+ (A) 11/04/2024    WBCUA 0-5 11/04/2024       Imaging      Imaging Results              CT Abdomen Pelvis With IV Contrast NO Oral Contrast (Preliminary result)  Result time 11/04/24 02:40:03      Preliminary result by Lalo Rubin MD (11/04/24 02:40:03)                   Narrative:    START OF REPORT:  Technique: CT of the abdomen and pelvis was performed with axial images as well as sagittal and coronal reconstruction images with intravenous contrast.    Comparison: Comparison is with study dated 2023-12-03 04:21:55.    Clinical History: Suspected Pancreatitis,.    Dosage Information: Automated Exposure Control was utilized 246.28 mGy.cm.    Findings:  Lines and Tubes: None.  Thorax:  Lungs: The visualized lung bases appear unremarkable. No focal infiltrate or consolidation is seen.  Pleura: No effusions or thickening.  Heart: The heart size is within normal  limits.  Abdomen:  Abdominal Wall: No abdominal wall pathology is seen.  Liver: Pronounced fatty infiltration of the liver is present. The liver otherwise appears unremarkable.  Biliary System: No intrahepatic or extrahepatic biliary duct dilatation is seen.  Gallbladder: The gallbladder appears unremarkable.  Pancreas: The pancreas appears unremarkable. Compared to the prior study, the pancreas appears perisistently edematous with continued presence of pronounced surrounding peripancreatic fat stranding and with interval increase in the surrounding intraperitoneal fluid in the bilateral anterior pararenal spaces and paracolic gutters as well as in the perisplenic, perihepatic and pelvic spaces. This is consistent with severe pancreatitis.  Spleen: The spleen appears unremarkable.  Adrenals: The adrenal glands appear unremarkable.  Kidneys: The kidneys appear unremarkable with no stones cysts masses or hydronephrosis.  Aorta: The abdominal aorta appears unremarkable.  IVC: Unremarkable.  Bowel:  Esophagus: The visualized esophagus appears unremarkable.  Stomach: The stomach appears unremarkable.  Duodenum: Unremarkable appearing duodenum.  Small Bowel: The small bowel appears unremarkable.  Colon: Nondistended.  Appendix: The appendix appears unremarkable and is seen on Image 125, Series 2 through Image 110, Series 2.  Peritoneum: No free intraperitoneal air is seen.    Pelvis:  Bladder: The bladder appears unremarkable.  Male:  Prostate gland: The prostate gland appears unremarkable.    Bony structures:  Dorsal Spine: The visualized dorsal spine appears unremarkable.  Bony Pelvis: The visualized bony structures of the pelvis appear unremarkable.      Impression:  1. Compared to the prior study, the pancreas appears perisistently edematous with continued presence of pronounced surrounding peripancreatic fat stranding and with interval increase in the surrounding intraperitoneal fluid in the bilateral anterior  pararenal spaces and paracolic gutters as well as in the perisplenic, perihepatic and pelvic spaces. This is consistent with severe pancreatitis. Correlate with clinical and laboratory findings as regards further evaluation and followup.  2. Details and other findings as discussed above.                                         Assessment and Plan     Acute Pancreatitis   H/O Daily Alcohol Use  -Long alcohol hx; last drink was 11/2  -Lipase 2015, amylase ordered  -UDS with + cannabinoids  -CT scan confirmed acute pancreatitis  -[place] 1 (LDH pending),   -Received 1L of NS bolus in ED. Also received 16mg of Morphine.  -Continue on LR 100cc/hr until tolerating PO   -Clear Liquid diet; advance as tolerated  -Zofran PRN  -Morphine 2mg q4h prn for pain; can increase if needed  -Lipid panel pending  -Continuous pulse ox ordered  -CIWA precautions ordered  -Will order folic acid and thiamine    Transaminitis  -AST 92,   -Fatty infiltration seen on liver in CT scan   -Will continue to monitor; consider U/S if continues to worsen     Elevated BP   -Does not take any medications at home for BP; previously given Norvasc 5mg but pt states he self discontinued the medication  -Likely secondary to pain  -PRN Hydralazine and Labetalol ordered           CODE STATUS: FULL  Access: Peripheral IV  Antibiotics: None  Diet: Clear Liquid; AAT  DVT Prophylaxis: Lovenox   GI Prophylaxis: None   Fluids: LR at 100 ml/hr      Disposition: Pt admitted for acute pancreatitis. Morphine 2mg prn for pain control; Zofran prn for nausea. Rehydration with LR @ 100cc/hr. Advance diet as tolerated      Srini Davies MD  [facility]

## 2024-11-04 NOTE — PLAN OF CARE
Problem: Adult Inpatient Plan of Care  Goal: Plan of Care Review  Outcome: Progressing  Goal: Patient-Specific Goal (Individualized)  Outcome: Progressing  Goal: Absence of Hospital-Acquired Illness or Injury  Outcome: Progressing  Goal: Optimal Comfort and Wellbeing  Outcome: Progressing  Goal: Readiness for Transition of Care  Outcome: Progressing     Problem: Pain Acute  Goal: Optimal Pain Control and Function  Outcome: Progressing     Problem: Pancreatitis  Goal: Fluid and Electrolyte Balance  Outcome: Progressing  Goal: Absence of Infection Signs and Symptoms  Outcome: Progressing  Goal: Optimal Nutrition Delivery  Outcome: Progressing  Goal: Optimal Pain Control and Function  Outcome: Progressing  Goal: Effective Oxygenation and Ventilation  Outcome: Progressing

## 2024-11-04 NOTE — PROGRESS NOTES
"[facility] Progress Note      Resident Team: [facility] 2  Attending Physician: Radha Vázquez MD  Resident: Landy Franco DO  Intern: Milton Rob MD     Date of Admit: 11/4/2024  Chief Complaint   Abdominal Pain, Nausea, and Vomiting       HPI   Osei Wellington is a 29 y.o. male with PMH of HTN and h/o alcohol use who presented to the ED on 11/4/2024  c/o of Abdominal Pain, Nausea, and Vomiting    Per H&P, pt states his symptoms began 11/3 around 2pm in the afternoon immediately after eating one of his son's candy bars. Began having 10/10 abdominal pain right in the upper and center of his abdomen as well as nausea. Thought he could get through the symptoms at home however nausea and pain overwhelming so  he decided to seek medical attention.  Admits to recent alcohol binge on 11/2; says he drank 3 beatboxes (approximately 3 standard drinks). Daily drinker; last episode of pancreatitis was about 12/2023. Also admits to occasional marijuana use.     ED Course - Vital signs on admission he was AF, HR 77, hypertensive 184/128, and O2 sat 100% on room air. Lab work showed leukocytosis up to 16.24, bicarb 18, Transaminitis, elevated lipase 2015. Lactic level wnl. UDS +Cannabinoids. CT abdomen and pelvis concerning for acute pancreatitis. He was given Zofran 4mg, Saline bolus and Morphine 16mg. Pain still present so ED physician ordered Librium. Internal Medicine consulted for admission of acute pancreatitis.    Interval history:  Pt reports nausea and vomiting "32 times" since admission. CIWA score 8 this morning with some symptoms overlapping with symptoms expected with pancreatitis such as constant nausea, frequent vomiting. No tremors, agitation, hallucinations. Last drink 11/02 but reports 3 standard drinks. Currently experiencing 9/10 epigastric pain. PRN morphine squeduled q4h and pt reports relief lasting 3 hours.       History    PMH:  Past Medical History:   Diagnosis Date    Pancreatitis " "     Past Surgical History: History reviewed. No pertinent surgical history.  Family History: No family history on file.  Social:   Social History     Tobacco Use    Smoking status: Never    Smokeless tobacco: Never   Substance Use Topics    Alcohol use: Yes     Comment: Binge drinker    Drug use: Yes     Types: Marijuana     Comment: Vape     Allergies: Review of patient's allergies indicates:  No Known Allergies  Home Medications   Prior to Admission medications    Medication Sig Start Date End Date Taking? Authorizing Provider   amLODIPine (NORVASC) 10 MG tablet Take 1 tablet (10 mg total) by mouth once daily. 23  Monet Forrester MD       Review of Systems   Review of Systems   Constitutional:  Positive for diaphoresis and malaise/fatigue.   Respiratory:  Negative for shortness of breath.    Cardiovascular:  Negative for chest pain.   Gastrointestinal:  Positive for abdominal pain, nausea and vomiting. Negative for diarrhea.        Vital Signs    BP  Min: 170/89  Max: 224/118  Temp  Av.9 °F (36.6 °C)  Min: 97.4 °F (36.3 °C)  Max: 98.2 °F (36.8 °C)  Pulse  Av.2  Min: 56  Max: 77  Resp  Av.7  Min: 16  Max: 18  SpO2  Av.6 %  Min: 97 %  Max: 100 %  Height  Av' 2" (188 cm)  Min: 6' 2" (188 cm)  Max: 6' 2" (188 cm)  Weight  Av.6 kg (180 lb)  Min: 81.6 kg (180 lb)  Max: 81.6 kg (180 lb)  Body mass index is 23.11 kg/m².  I/O last 3 completed shifts:  In: 2448 [IV Piggyback:2448]  Out: -     Physical Exam    Physical Exam  Vitals reviewed.   Constitutional:       Appearance: Normal appearance. He is not ill-appearing or diaphoretic.   Cardiovascular:      Rate and Rhythm: Normal rate and regular rhythm.      Pulses: Normal pulses.      Heart sounds: Normal heart sounds.   Pulmonary:      Effort: Pulmonary effort is normal.      Breath sounds: Normal breath sounds.   Abdominal:      General: Abdomen is flat. Bowel sounds are normal. There is no distension.      Palpations: " "Abdomen is soft.      Tenderness: There is abdominal tenderness in the epigastric area.   Neurological:      Mental Status: He is alert and oriented to person, place, and time.      Comments: CIWA score 8    Psychiatric:         Attention and Perception: Attention normal.         Mood and Affect: Mood and affect normal.         Speech: Speech normal.         Behavior: Behavior is cooperative.         Labs    Most Recent Data:  CBC:   Lab Results   Component Value Date    WBC 13.15 (H) 11/04/2024    HGB 15.7 11/04/2024    HCT 45.6 11/04/2024     11/04/2024    MCV 93.6 11/04/2024    RDW 14.2 11/04/2024     WBC Differential:   Recent Labs   Lab 11/04/24  0119 11/04/24  0610   WBC 16.24* 13.15*   HGB 17.7 15.7   HCT 50.1 45.6    182   MCV 92.1 93.6     BMP:   Lab Results   Component Value Date     11/04/2024    K 4.2 11/04/2024     11/04/2024    CO2 24 11/04/2024    BUN 10.5 11/04/2024    CREATININE 0.82 11/04/2024    CALCIUM 8.6 11/04/2024    MG 1.70 11/04/2024    PHOS 3.4 11/04/2024     LFTs:   Lab Results   Component Value Date    ALBUMIN 3.6 11/04/2024    BILITOT 0.9 11/04/2024    AST 69 (H) 11/04/2024    ALKPHOS 81 11/04/2024    ALT 96 (H) 11/04/2024     Coags: No results found for: "INR", "PROTIME", "PTT"  FLP:   Lab Results   Component Value Date    CHOL 204 (H) 11/04/2024    HDL 96 (H) 11/04/2024    TRIG 80 11/04/2024     DM:   Lab Results   Component Value Date    HGBA1C 4.9 11/04/2024    HGBA1C 5.0 04/19/2021    CREATININE 0.82 11/04/2024     Thyroid:   Lab Results   Component Value Date    TSH 0.6212 04/19/2021      Anemia: No results found for: "IRON", "TIBC", "FERRITIN", "SATURATEDIRO"  No results found for: "[id number]"  No results found for: "FOLATE"     Cardiac: No results found for: "TROPONINI", "CKTOTAL", "CKMB", "BNP"  Urinalysis:   Lab Results   Component Value Date    COLORU Light-Orange (A) 11/04/2024    NITRITE Negative 11/04/2024    KETONESU Trace (A) 04/18/2021    " UROBILINOGEN 1+ (A) 11/04/2024    WBCUA 0-5 11/04/2024       Trended Lab Data:  Recent Labs   Lab 11/04/24  0119 11/04/24  0610   WBC 16.24* 13.15*   HGB 17.7 15.7   HCT 50.1 45.6    182   MCV 92.1 93.6   RDW 13.9 14.2   * 137   K 4.1 4.2    104   CO2 18* 24   BUN 11.3 10.5   CREATININE 0.86 0.82   ALBUMIN 4.2 3.6   BILITOT 1.2 0.9   AST 92* 69*   ALKPHOS 93 81   * 96*       Imaging      Imaging Results              CT Abdomen Pelvis With IV Contrast NO Oral Contrast (Final result)  Result time 11/04/24 07:49:26      Final result by Petey Munson MD (11/04/24 07:49:26)                   Impression:      Compared to the prior study, the pancreas appears perisistently edematous with continued presence of pronounced surrounding peripancreatic fat stranding and with interval increase in the surrounding intraperitoneal fluid in the bilateral anterior pararenal spaces and paracolic gutters as well as in the perisplenic, perihepatic and pelvic spaces. This is consistent with severe pancreatitis. Correlate with clinical and laboratory findings as regards further evaluation and followup.  Of note prior study was 1 year prior so findings likely represent recurrent pancreatitis.      Electronically signed by: Petey Munson  Date:    11/04/2024  Time:    07:49               Narrative:    EXAMINATION:  CT ABDOMEN PELVIS WITH IV CONTRAST    CLINICAL HISTORY:  pancreatis appears likely;    TECHNIQUE:  Multidetector IV contrast enhanced axial CT images of the abdomen and pelvis were obtained with coronal and sagittal reconstructions.    Automatic exposure control was utilized to reduce the patient's radiation dose.    DLP= 246    COMPARISON:  12/03/2023    FINDINGS:  Lines and Tubes: None.Thorax:Lungs: The visualized lung bases appear unremarkable. No focal infiltrate or consolidation is seen.Pleura: No effusions or thickening.Heart: The heart size is within normal limits.Abdomen:Abdominal Wall: No  abdominal wall pathology is seen.Liver: Pronounced fatty infiltration of the liver is present. The liver otherwise appears unremarkable.Biliary System: No intrahepatic or extrahepatic biliary duct dilatation is seen.Gallbladder: The gallbladder appears unremarkable.Pancreas: The pancreas appears unremarkable. Compared to the prior study, the pancreas appears perisistently edematous with continued presence of pronounced surrounding peripancreatic fat stranding and with interval increase in the surrounding intraperitoneal fluid in the bilateral anterior pararenal spaces and paracolic gutters as well as in the perisplenic, perihepatic and pelvic spaces. This is consistent with severe pancreatitis.Spleen: The spleen appears unremarkable.Adrenals: The adrenal glands appear unremarkable.Kidneys: The kidneys appear unremarkable with no stones cysts masses or hydronephrosis.Aorta: The abdominal aorta appears unremarkable.IVC: Unremarkable.Bowel:Esophagus: The visualized esophagus appears unremarkable.Stomach: The stomach appears unremarkable.Duodenum: Unremarkable appearing duodenum.Small Bowel: The small bowel appears unremarkable.Colon: Nondistended.Appendix: The appendix appears unremarkable and is seen on Image 125, Series 2 through Image 110, Series 2.Peritoneum: No free intraperitoneal air is seen.Pelvis:Bladder: The bladder appears unremarkable.Male:Prostate gland: The prostate gland appears unremarkable.Bony structures:Dorsal Spine: The visualized dorsal spine appears unremarkable.Bony Pelvis: The visualized bony structures of the pelvis appear unremarkable.                        Preliminary result by Lalo Rubin MD (11/04/24 02:40:03)                   Impression:    1. Compared to the prior study, the pancreas appears perisistently edematous with continued presence of pronounced surrounding peripancreatic fat stranding and with interval increase in the surrounding intraperitoneal fluid in the bilateral  anterior pararenal spaces and paracolic gutters as well as in the perisplenic, perihepatic and pelvic spaces. This is consistent with severe pancreatitis. Correlate with clinical and laboratory findings as regards further evaluation and followup.  2. Details and other findings as discussed above.               Narrative:    START OF REPORT:  Technique: CT of the abdomen and pelvis was performed with axial images as well as sagittal and coronal reconstruction images with intravenous contrast.    Comparison: Comparison is with study dated 2023-12-03 04:21:55.    Clinical History: Suspected Pancreatitis,.    Dosage Information: Automated Exposure Control was utilized 246.28 mGy.cm.    Findings:  Lines and Tubes: None.  Thorax:  Lungs: The visualized lung bases appear unremarkable. No focal infiltrate or consolidation is seen.  Pleura: No effusions or thickening.  Heart: The heart size is within normal limits.  Abdomen:  Abdominal Wall: No abdominal wall pathology is seen.  Liver: Pronounced fatty infiltration of the liver is present. The liver otherwise appears unremarkable.  Biliary System: No intrahepatic or extrahepatic biliary duct dilatation is seen.  Gallbladder: The gallbladder appears unremarkable.  Pancreas: The pancreas appears unremarkable. Compared to the prior study, the pancreas appears perisistently edematous with continued presence of pronounced surrounding peripancreatic fat stranding and with interval increase in the surrounding intraperitoneal fluid in the bilateral anterior pararenal spaces and paracolic gutters as well as in the perisplenic, perihepatic and pelvic spaces. This is consistent with severe pancreatitis.  Spleen: The spleen appears unremarkable.  Adrenals: The adrenal glands appear unremarkable.  Kidneys: The kidneys appear unremarkable with no stones cysts masses or hydronephrosis.  Aorta: The abdominal aorta appears unremarkable.  IVC: Unremarkable.  Bowel:  Esophagus: The visualized  esophagus appears unremarkable.  Stomach: The stomach appears unremarkable.  Duodenum: Unremarkable appearing duodenum.  Small Bowel: The small bowel appears unremarkable.  Colon: Nondistended.  Appendix: The appendix appears unremarkable and is seen on Image 125, Series 2 through Image 110, Series 2.  Peritoneum: No free intraperitoneal air is seen.    Pelvis:  Bladder: The bladder appears unremarkable.  Male:  Prostate gland: The prostate gland appears unremarkable.    Bony structures:  Dorsal Spine: The visualized dorsal spine appears unremarkable.  Bony Pelvis: The visualized bony structures of the pelvis appear unremarkable.                                         Assessment and Plan       Acute Pancreatitis   Refractory pain  H/O Daily Alcohol Use  hx alcohol; last drink reported 11/2  Lipase 2015, amylase 969 Lactate dehydrogenase 252   CT scan confirmed acute pancreatitis  Pt tolerating jello and popscicle but not tolerating beef broth  Received 16 mg morphine 11/04 between 8914-1770 and 2 mg at 0705.  Reports morphine not helping with pain  -increasing IVF to 150cc/hr until tolerating PO   -Discontinuing morphine  -start Hydromorphone 1mg q4h prn severe pain  -advance diet as tolerated  -Zofran PRN  -CIWA precautions with Librium prn   -Will order folic acid and thiamine     Transaminitis  -AST 92,   -Fatty infiltration seen on liver in CT scan   -Will continue to monitor; consider U/S if continues to worsen     Elevated BP   24 hour range (170-224) / ()  -start amlodipine 10 mg daily   -PRN Hydralazine and Labetalol      Glucosuria  Trace glucose on UA  -A1c 4.9  -stress hyperglycemia 2/2 to pancreatitis       CODE STATUS: FULL  Access: Peripheral IV  Antibiotics: None  Diet: Clear Liquid; AAT  DVT Prophylaxis: Lovenox   GI Prophylaxis: None   Fluids: LR at 100 ml/hr        Disposition: Pt admitted for acute pancreatitis.reporting persistent nausea/vomiting. pain refractory to morphine.  Increasing IVF from 100 to 150 ml/hr and changing pain medication from morphine to Dilaudid.     Milton Rob MD  [facility]

## 2024-11-04 NOTE — PROGRESS NOTES
Inpatient Nutrition Evaluation    Admit Date: 11/4/2024   Total duration of encounter: 1 day   Patient Age: 29 y.o.    Nutrition Recommendation/Prescription     ADAT to Low Na diet  Boost Breeze (provides 250 kcal, 9 g protein per serving) TID to supplement CL diet  Monitor Weight Weekly     Nutrition Assessment     Chart Review    Reason Seen: continuous nutrition monitoring    Malnutrition Screening Tool Results   Have you recently lost weight without trying?: No  Have you been eating poorly because of a decreased appetite?: No   MST Score: 0   Diagnosis:  Acute pancreatitis, h/o daily ETOH use, Transaminitis, Elevated BP    Relevant Medical History: HTN, Alcohol use    Scheduled Medications:  amLODIPine, 10 mg, Daily  enoxparin, 40 mg, Q24H (prophylaxis, 1700)  folic acid, 1 mg, Daily  multivitamin, 1 tablet, Daily  thiamine, 100 mg, Daily    Continuous Infusions:  0.9% NaCl  lactated ringers, Last Rate: 150 mL/hr at 11/04/24 1106    PRN Medications:   Current Facility-Administered Medications:     hydrALAZINE, 10 mg, Intravenous, Q4H PRN    HYDROmorphone, 1 mg, Intravenous, Q4H PRN    labetalol, 10 mg, Intravenous, Q4H PRN    LORazepam, 2 mg, Oral, Q4H PRN    melatonin, 6 mg, Oral, Nightly PRN    ondansetron, 4 mg, Intravenous, Q8H PRN    sodium chloride 0.9%, 10 mL, Intravenous, PRN    Recent Labs   Lab 11/04/24  0119 11/04/24  0146 11/04/24  0610 11/04/24  0741   *  --  137  --    K 4.1  --  4.2  --    CALCIUM 9.7  --  8.6  --    PHOS  --   --  3.4  --    MG 1.80  --  1.70  --    CO2 18*  --  24  --    BUN 11.3  --  10.5  --    CREATININE 0.86  --  0.82  --    EGFRNORACEVR >60  --  >60  --    GLUCOSE 149*  --  116*  --    BILITOT 1.2  --  0.9  --    ALKPHOS 93  --  81  --    *  --  96*  --    AST 92*  --  69*  --    ALBUMIN 4.2  --  3.6  --    TRIG  --  80  --   --    HGBA1C  --   --   --  4.9   LIPASE 2,015*  --   --   --    AMYLASE  --  969*  --   --    WBC 16.24*  --  13.15*  --    HGB 17.7  --  " 15.7  --    HCT 50.1  --  45.6  --      Nutrition Orders:  Diet Clear Liquid  Dietary nutrition supplements TID; Boost Breeze - Any flavor    Appetite/Oral Intake: poor/0-25% of meals  Factors Affecting Nutritional Intake: decreased appetite, nausea, and vomiting  Social Needs Impacting Access to Food: none identified  Food/Judaism/Cultural Preferences: none reported  Food Allergies: no known food allergies  Last Bowel Movement: 24  Wound(s):  skin intact    Comments    24 -- Pt with n/v that began on 11/3, reports emesis after trying juice this am; reports normal good appetite prior to onset of symptoms; denies n/v reporting UBW as 180 lb, current weight verified via bed scale this visit; will monitor diet progression & need for alternative nutrition    Anthropometrics    Height: 6' 2" (188 cm), Height Method: Stated  Last Weight: 83.3 kg (183 lb 10.3 oz) (24 1202), Weight Method: Bed Scale  BMI (Calculated): 23.6  BMI Classification: normal (BMI 18.5-24.9)        Ideal Body Weight (IBW), Male: 190 lb     % Ideal Body Weight, Male (lb): 94.74 %                 Usual Body Weight (UBW), k.8 kg  % Usual Body Weight: 102.05  % Weight Change From Usual Weight: 1.83 %  Usual Weight Provided By: patient    Wt Readings from Last 5 Encounters:   24 83.3 kg (183 lb 10.3 oz)   23 81.6 kg (180 lb)   23 76.6 kg (168 lb 14 oz)     Weight Change(s) Since Admission:   Wt Readings from Last 1 Encounters:   24 1202 83.3 kg (183 lb 10.3 oz)   24 81.6 kg (180 lb)   Admit Weight: 81.6 kg (180 lb) (24), Weight Method: Standard Scale    Patient Education     Not applicable.    Nutrition Goals & Monitoring     Dietitian will monitor: food and beverage intake, weight, and gastrointestinal profile  Discharge planning: resume home regimen  Nutrition Risk/Follow-Up: low (follow-up in 5-7 days)  Patients assigned 'low nutrition risk' status do not qualify for a full " nutritional assessment but will be monitored and re-evaluated in a 5-7 day time period. Please consult if re-evaluation needed sooner.

## 2024-11-04 NOTE — PLAN OF CARE
Problem: Pain Acute  Goal: Optimal Pain Control and Function  Outcome: Progressing  Intervention: Develop Pain Management Plan  Flowsheets (Taken 11/4/2024 0528)  Pain Management Interventions: pain management plan reviewed with patient/caregiver  Intervention: Prevent or Manage Pain  Flowsheets (Taken 11/4/2024 0528)  Bowel Elimination Promotion: adequate fluid intake promoted  Intervention: Optimize Psychosocial Wellbeing  Flowsheets (Taken 11/4/2024 0528)  Supportive Measures: active listening utilized     Problem: Pancreatitis  Goal: Fluid and Electrolyte Balance  Outcome: Progressing  Intervention: Monitor and Manage Fluid and Electrolyte Balance  Flowsheets (Taken 11/4/2024 0528)  Fluid/Electrolyte Management:   fluids provided   intravenous fluids adjusted     Problem: Pancreatitis  Goal: Fluid and Electrolyte Balance  Intervention: Monitor and Manage Fluid and Electrolyte Balance  Flowsheets (Taken 11/4/2024 0528)  Fluid/Electrolyte Management:   fluids provided   intravenous fluids adjusted     Problem: Pancreatitis  Goal: Absence of Infection Signs and Symptoms  Outcome: Progressing  Intervention: Prevent or Manage Infection  Flowsheets (Taken 11/4/2024 0528)  Infection Management: aseptic technique maintained     Problem: Pancreatitis  Goal: Optimal Nutrition Delivery  Outcome: Progressing     Problem: Pancreatitis  Goal: Optimal Pain Control and Function  Outcome: Progressing  Intervention: Monitor and Manage Pain  Flowsheets (Taken 11/4/2024 0528)  Pain Management Interventions: pain management plan reviewed with patient/caregiver     Problem: Pancreatitis  Goal: Effective Oxygenation and Ventilation  Outcome: Progressing

## 2024-11-04 NOTE — ED PROVIDER NOTES
Encounter Date: 11/4/2024       History     Chief Complaint   Patient presents with    Abdominal Pain    Nausea    Vomiting     Drinking binge followed by epigastric abdominal pain similar to previous episodes diagnosed as alcohol-induced pancreatitis;        Review of patient's allergies indicates:  No Known Allergies  Past Medical History:   Diagnosis Date    Pancreatitis 2020     History reviewed. No pertinent surgical history.  No family history on file.  Social History     Tobacco Use    Smoking status: Never    Smokeless tobacco: Never   Substance Use Topics    Alcohol use: Yes     Comment: Binge drinker    Drug use: Yes     Types: Marijuana     Comment: Vape     Review of Systems    Physical Exam     Initial Vitals [11/04/24 0038]   BP Pulse Resp Temp SpO2   (!) 184/128 77 18 97.4 °F (36.3 °C) 100 %      MAP       --         Physical Exam    Nursing note and vitals reviewed.  Constitutional: He appears well-developed and well-nourished. He is not diaphoretic. No distress.   HENT:   Head: Normocephalic and atraumatic.   Eyes: EOM are normal. Pupils are equal, round, and reactive to light. Right eye exhibits no discharge. Left eye exhibits no discharge.   Neck: Neck supple. No thyromegaly present. No tracheal deviation present. No JVD present.   Normal range of motion.  Cardiovascular:  Normal rate, regular rhythm, normal heart sounds and intact distal pulses.           No murmur heard.  Pulmonary/Chest: Breath sounds normal. No stridor. No respiratory distress. He has no wheezes. He has no rhonchi. He has no rales.   Abdominal: Abdomen is soft. He exhibits no distension. There is no abdominal tenderness. There is no rebound and no guarding.   Musculoskeletal:         General: No tenderness or edema. Normal range of motion.      Cervical back: Normal range of motion and neck supple.     Neurological: He is alert and oriented to person, place, and time. He has normal strength. No cranial nerve deficit. GCS score  is 15. GCS eye subscore is 4. GCS verbal subscore is 5. GCS motor subscore is 6.   Skin: Skin is warm and dry. Capillary refill takes less than 2 seconds. No rash and no abscess noted. No erythema. No pallor.   Psychiatric: He has a normal mood and affect. His behavior is normal. Judgment and thought content normal.         ED Course   Procedures  Labs Reviewed   COMPREHENSIVE METABOLIC PANEL - Abnormal       Result Value    Sodium 133 (*)     Potassium 4.1      Chloride 101      CO2 18 (*)     Glucose 149 (*)     Blood Urea Nitrogen 11.3      Creatinine 0.86      Calcium 9.7      Protein Total 7.6      Albumin 4.2      Globulin 3.4      Albumin/Globulin Ratio 1.2      Bilirubin Total 1.2      ALP 93       (*)     AST 92 (*)     eGFR >60      Anion Gap 14.0      BUN/Creatinine Ratio 13     LIPASE - Abnormal    Lipase Level 2,015 (*)    URINALYSIS, REFLEX TO URINE CULTURE - Abnormal    Color, UA Light-Orange (*)     Appearance, UA Turbid (*)     Specific Gravity, UA 1.047 (*)     pH, UA 6.5      Protein, UA 3+ (*)     Glucose, UA Trace (*)     Ketones, UA 2+ (*)     Blood, UA Negative      Bilirubin, UA 1+ (*)     Urobilinogen, UA 1+ (*)     Nitrites, UA Negative      Leukocyte Esterase, UA Negative      RBC, UA None Seen      WBC, UA 0-5      Bacteria, UA Trace (*)     Squamous Epithelial Cells, UA None Seen      Mucous, UA Many (*)     Hyaline Casts, UA None Seen     DRUG SCREEN, URINE (BEAKER) - Abnormal    Amphetamines, Urine Negative      Barbiturates, Urine Negative      Benzodiazepine, Urine Negative      Cannabinoids, Urine Positive (*)     Cocaine, Urine Negative      Fentanyl, Urine Negative      MDMA, Urine Negative      Opiates, Urine Negative      Phencyclidine, Urine Negative      pH, Urine 6.5      Specific Gravity, Urine Auto 1.047 (*)     Narrative:     Cut off concentrations:    Amphetamines - 1000 ng/ml  Barbiturates - 200 ng/ml  Benzodiazepine - 200 ng/ml  Cannabinoids (THC) - 50  ng/ml  Cocaine - 300 ng/ml  Fentanyl - 1.0 ng/ml  MDMA - 500 ng/ml  Opiates - 300 ng/ml   Phencyclidine (PCP) - 25 ng/ml    Specimen submitted for drug analysis and tested for pH and specific gravity in order to evaluate sample integrity. Suspect tampering if specific gravity is <1.003 and/or pH is not within the range of 4.5 - 8.0  False negatives may result form substances such as bleach added to urine.  False positives may result for the presence of a substance with similar chemical structure to the drug or its metabolite.    This test provides only a PRELIMINARY analytical test result. A more specific alternate chemical method must be used in order to obtain a confirmed analytical result. Gas chromatography/mass spectrometry (GC/MS) is the preferred confirmatory method. Other chemical confirmation methods are available. Clinical consideration and professional judgement should be applied to any drug of abuse test result, particularly when preliminary positive results are used.    Positive results will be confirmed only at the physicians request. Unconfirmed screening results are to be used only for medical purposes (treatment).        CBC WITH DIFFERENTIAL - Abnormal    WBC 16.24 (*)     RBC 5.44      Hgb 17.7      Hct 50.1      MCV 92.1      MCH 32.5 (*)     MCHC 35.3      RDW 13.9      Platelet 219      MPV 11.7 (*)     Neut % 89.9      Lymph % 4.1      Mono % 5.0      Eos % 0.2      Basophil % 0.4      Lymph # 0.66      Neut # 14.59 (*)     Mono # 0.82      Eos # 0.04      Baso # 0.06      IG# 0.07 (*)     IG% 0.4      NRBC% 0.0     MAGNESIUM - Normal    Magnesium Level 1.80     LACTIC ACID, PLASMA - Normal    Lactic Acid Level 2.2     ALCOHOL,MEDICAL (ETHANOL) - Normal    Ethanol Level <10.0     CBC W/ AUTO DIFFERENTIAL    Narrative:     The following orders were created for panel order CBC auto differential.  Procedure                               Abnormality         Status                     ---------                                -----------         ------                     CBC with Differential[[id number]]       Abnormal            Final result                 Please view results for these tests on the individual orders.   LACTIC ACID, PLASMA          Imaging Results              CT Abdomen Pelvis With IV Contrast NO Oral Contrast (Preliminary result)  Result time 11/04/24 02:40:03      Preliminary result by Lalo Rubin MD (11/04/24 02:40:03)                   Narrative:    START OF REPORT:  Technique: CT of the abdomen and pelvis was performed with axial images as well as sagittal and coronal reconstruction images with intravenous contrast.    Comparison: Comparison is with study dated 2023-12-03 04:21:55.    Clinical History: Suspected Pancreatitis,.    Dosage Information: Automated Exposure Control was utilized 246.28 mGy.cm.    Findings:  Lines and Tubes: None.  Thorax:  Lungs: The visualized lung bases appear unremarkable. No focal infiltrate or consolidation is seen.  Pleura: No effusions or thickening.  Heart: The heart size is within normal limits.  Abdomen:  Abdominal Wall: No abdominal wall pathology is seen.  Liver: Pronounced fatty infiltration of the liver is present. The liver otherwise appears unremarkable.  Biliary System: No intrahepatic or extrahepatic biliary duct dilatation is seen.  Gallbladder: The gallbladder appears unremarkable.  Pancreas: The pancreas appears unremarkable. Compared to the prior study, the pancreas appears perisistently edematous with continued presence of pronounced surrounding peripancreatic fat stranding and with interval increase in the surrounding intraperitoneal fluid in the bilateral anterior pararenal spaces and paracolic gutters as well as in the perisplenic, perihepatic and pelvic spaces. This is consistent with severe pancreatitis.  Spleen: The spleen appears unremarkable.  Adrenals: The adrenal glands appear unremarkable.  Kidneys: The kidneys appear  unremarkable with no stones cysts masses or hydronephrosis.  Aorta: The abdominal aorta appears unremarkable.  IVC: Unremarkable.  Bowel:  Esophagus: The visualized esophagus appears unremarkable.  Stomach: The stomach appears unremarkable.  Duodenum: Unremarkable appearing duodenum.  Small Bowel: The small bowel appears unremarkable.  Colon: Nondistended.  Appendix: The appendix appears unremarkable and is seen on Image 125, Series 2 through Image 110, Series 2.  Peritoneum: No free intraperitoneal air is seen.    Pelvis:  Bladder: The bladder appears unremarkable.  Male:  Prostate gland: The prostate gland appears unremarkable.    Bony structures:  Dorsal Spine: The visualized dorsal spine appears unremarkable.  Bony Pelvis: The visualized bony structures of the pelvis appear unremarkable.      Impression:  1. Compared to the prior study, the pancreas appears perisistently edematous with continued presence of pronounced surrounding peripancreatic fat stranding and with interval increase in the surrounding intraperitoneal fluid in the bilateral anterior pararenal spaces and paracolic gutters as well as in the perisplenic, perihepatic and pelvic spaces. This is consistent with severe pancreatitis. Correlate with clinical and laboratory findings as regards further evaluation and followup.  2. Details and other findings as discussed above.                                      X-Rays:   Independently Interpreted Readings:   Other Readings:  CT compatible with pancreatitis;    Medications   chlordiazepoxide capsule 50 mg (has no administration in time range)   sodium chloride 0.9% bolus 2,448 mL 2,448 mL (2,448 mLs Intravenous New Bag 11/4/24 0117)   morphine injection 6 mg (6 mg Intravenous Given 11/4/24 0118)   diphenhydrAMINE injection 25 mg (25 mg Intravenous Given 11/4/24 0118)   iohexoL (OMNIPAQUE 350) 350 mg iodine/mL injection (100 mLs  Given 11/4/24 0219)   morphine injection 10 mg (10 mg Intravenous Given  11/4/24 0245)   ondansetron injection 4 mg (4 mg Intravenous Given 11/4/24 0245)     Medical Decision Making  Differential diagnosis pancreatitis, gastritis, alcohol intoxication versus withdrawal, alcoholic gastritis, biliary colic versus cholecystitis, others ...    Amount and/or Complexity of Data Reviewed  External Data Reviewed: notes.  Labs: ordered.     Details: As above;  Radiology: ordered and independent interpretation performed. Decision-making details documented in ED Course.     Details: CT compatible with pancreatitis;  Discussion of management or test interpretation with external provider(s): Inpatient medicine team aware of case, plan admit;    Risk  Prescription drug management.  Decision regarding hospitalization.  Risk Details: Risk found sufficient to warrant admission for further evaluation, supervision of clinical course;               ED Course as of 11/04/24 0342 Mon Nov 04, 2024   0138 Hemogram with leukocytosis; [CT]   0206 Lipase 2015; [CT]   0206 Generally reassuring chemistries; [CT]   0206 Urine toxicology positive for cannabinoids; [CT]   0206 Negative lactate; [CT]   0337 Negative serum ethanol level; [CT]      ED Course User Index  [CT] Yanick Peralta MD                           Clinical Impression:  Final diagnoses:  [K85.20] Alcohol-induced acute pancreatitis without infection or necrosis (Primary)          ED Disposition Condition    Observation Stable                Yanick Peralta MD  11/04/24 0342

## 2024-11-04 NOTE — CARE UPDATE
Spoke with patient who reports resolution of pain with change from morphine to Dilaudid.     Milton Rob MD  \[facility] [place] [facility]\"" Family Medicine -I

## 2024-11-05 LAB
ALBUMIN SERPL-MCNC: 3.1 G/DL (ref 3.5–5)
ALBUMIN/GLOB SERPL: 1.1 RATIO (ref 1.1–2)
ALP SERPL-CCNC: 60 UNIT/L (ref 40–150)
ALT SERPL-CCNC: 63 UNIT/L (ref 0–55)
ANION GAP SERPL CALC-SCNC: 6 MEQ/L
AST SERPL-CCNC: 41 UNIT/L (ref 5–34)
BASOPHILS # BLD AUTO: 0.01 X10(3)/MCL
BASOPHILS NFR BLD AUTO: 0.1 %
BILIRUB SERPL-MCNC: 0.7 MG/DL
BUN SERPL-MCNC: 7 MG/DL (ref 8.9–20.6)
CALCIUM SERPL-MCNC: 8.4 MG/DL (ref 8.4–10.2)
CHLORIDE SERPL-SCNC: 103 MMOL/L (ref 98–107)
CO2 SERPL-SCNC: 26 MMOL/L (ref 22–29)
CREAT SERPL-MCNC: 0.75 MG/DL (ref 0.72–1.25)
CREAT/UREA NIT SERPL: 9
EOSINOPHIL # BLD AUTO: 0.32 X10(3)/MCL (ref 0–0.9)
EOSINOPHIL NFR BLD AUTO: 3.6 %
ERYTHROCYTE [DISTWIDTH] IN BLOOD BY AUTOMATED COUNT: 14.3 % (ref 11.5–17)
GFR SERPLBLD CREATININE-BSD FMLA CKD-EPI: >60 ML/MIN/1.73/M2
GLOBULIN SER-MCNC: 2.7 GM/DL (ref 2.4–3.5)
GLUCOSE SERPL-MCNC: 96 MG/DL (ref 74–100)
HCT VFR BLD AUTO: 40.6 % (ref 42–52)
HGB BLD-MCNC: 14.1 G/DL (ref 14–18)
IMM GRANULOCYTES # BLD AUTO: 0.04 X10(3)/MCL (ref 0–0.04)
IMM GRANULOCYTES NFR BLD AUTO: 0.5 %
LYMPHOCYTES # BLD AUTO: 0.83 X10(3)/MCL (ref 0.6–4.6)
LYMPHOCYTES NFR BLD AUTO: 9.4 %
MAGNESIUM SERPL-MCNC: 1.7 MG/DL (ref 1.6–2.6)
MCH RBC QN AUTO: 33 PG (ref 27–31)
MCHC RBC AUTO-ENTMCNC: 34.7 G/DL (ref 33–36)
MCV RBC AUTO: 95.1 FL (ref 80–94)
MONOCYTES # BLD AUTO: 0.79 X10(3)/MCL (ref 0.1–1.3)
MONOCYTES NFR BLD AUTO: 8.9 %
NEUTROPHILS # BLD AUTO: 6.88 X10(3)/MCL (ref 2.1–9.2)
NEUTROPHILS NFR BLD AUTO: 77.5 %
NRBC BLD AUTO-RTO: 0 %
PHOSPHATE SERPL-MCNC: 2.5 MG/DL (ref 2.3–4.7)
PLATELET # BLD AUTO: 133 X10(3)/MCL (ref 130–400)
PLATELETS.RETICULATED NFR BLD AUTO: 7.2 % (ref 0.9–11.2)
PMV BLD AUTO: 11.2 FL (ref 7.4–10.4)
POTASSIUM SERPL-SCNC: 3.8 MMOL/L (ref 3.5–5.1)
PROT SERPL-MCNC: 5.8 GM/DL (ref 6.4–8.3)
RBC # BLD AUTO: 4.27 X10(6)/MCL (ref 4.7–6.1)
SODIUM SERPL-SCNC: 135 MMOL/L (ref 136–145)
WBC # BLD AUTO: 8.87 X10(3)/MCL (ref 4.5–11.5)

## 2024-11-05 PROCEDURE — 63600175 PHARM REV CODE 636 W HCPCS

## 2024-11-05 PROCEDURE — 25000003 PHARM REV CODE 250

## 2024-11-05 PROCEDURE — 36415 COLL VENOUS BLD VENIPUNCTURE: CPT

## 2024-11-05 PROCEDURE — 85025 COMPLETE CBC W/AUTO DIFF WBC: CPT

## 2024-11-05 PROCEDURE — 94761 N-INVAS EAR/PLS OXIMETRY MLT: CPT

## 2024-11-05 PROCEDURE — 84100 ASSAY OF PHOSPHORUS: CPT

## 2024-11-05 PROCEDURE — 11000001 HC ACUTE MED/SURG PRIVATE ROOM

## 2024-11-05 PROCEDURE — 80053 COMPREHEN METABOLIC PANEL: CPT

## 2024-11-05 PROCEDURE — 83735 ASSAY OF MAGNESIUM: CPT

## 2024-11-05 RX ORDER — LISINOPRIL 10 MG/1
20 TABLET ORAL DAILY
Status: DISCONTINUED | OUTPATIENT
Start: 2024-11-05 | End: 2024-11-07

## 2024-11-05 RX ORDER — ACETAMINOPHEN 325 MG/1
650 TABLET ORAL EVERY 6 HOURS PRN
Status: DISCONTINUED | OUTPATIENT
Start: 2024-11-05 | End: 2024-11-07 | Stop reason: HOSPADM

## 2024-11-05 RX ORDER — SODIUM CHLORIDE 9 MG/ML
INJECTION, SOLUTION INTRAVENOUS CONTINUOUS
Status: DISCONTINUED | OUTPATIENT
Start: 2024-11-05 | End: 2024-11-07

## 2024-11-05 RX ORDER — HYDROMORPHONE HYDROCHLORIDE 1 MG/ML
1 INJECTION, SOLUTION INTRAMUSCULAR; INTRAVENOUS; SUBCUTANEOUS EVERY 6 HOURS PRN
Status: DISCONTINUED | OUTPATIENT
Start: 2024-11-05 | End: 2024-11-06

## 2024-11-05 RX ADMIN — SODIUM CHLORIDE: 9 INJECTION, SOLUTION INTRAVENOUS at 01:11

## 2024-11-05 RX ADMIN — HYDROMORPHONE HYDROCHLORIDE 1 MG: 1 INJECTION, SOLUTION INTRAMUSCULAR; INTRAVENOUS; SUBCUTANEOUS at 08:11

## 2024-11-05 RX ADMIN — HYDROMORPHONE HYDROCHLORIDE 1 MG: 1 INJECTION, SOLUTION INTRAMUSCULAR; INTRAVENOUS; SUBCUTANEOUS at 03:11

## 2024-11-05 RX ADMIN — SODIUM CHLORIDE: 9 INJECTION, SOLUTION INTRAVENOUS at 10:11

## 2024-11-05 RX ADMIN — ENOXAPARIN SODIUM 40 MG: 40 INJECTION SUBCUTANEOUS at 05:11

## 2024-11-05 RX ADMIN — FOLIC ACID 1 MG: 1 TABLET ORAL at 08:11

## 2024-11-05 RX ADMIN — SODIUM CHLORIDE: 9 INJECTION, SOLUTION INTRAVENOUS at 03:11

## 2024-11-05 RX ADMIN — ONDANSETRON 4 MG: 2 INJECTION INTRAMUSCULAR; INTRAVENOUS at 02:11

## 2024-11-05 RX ADMIN — ACETAMINOPHEN 325MG 650 MG: 325 TABLET ORAL at 12:11

## 2024-11-05 RX ADMIN — AMLODIPINE BESYLATE 10 MG: 10 TABLET ORAL at 08:11

## 2024-11-05 RX ADMIN — HYDROMORPHONE HYDROCHLORIDE 1 MG: 1 INJECTION, SOLUTION INTRAMUSCULAR; INTRAVENOUS; SUBCUTANEOUS at 02:11

## 2024-11-05 RX ADMIN — THERA TABS 1 TABLET: TAB at 08:11

## 2024-11-05 RX ADMIN — THIAMINE HCL TAB 100 MG 100 MG: 100 TAB at 08:11

## 2024-11-05 RX ADMIN — LISINOPRIL 20 MG: 10 TABLET ORAL at 12:11

## 2024-11-05 NOTE — PROGRESS NOTES
[facility] Medicine Wards Progress Note      Resident Team: [facility] Medicine List 2  Attending Physician: Radha Vázquez MD  Resident: Landy Franco DO  Intern: Milton Rob MD     Date of Admit: 11/4/2024  Chief Complaint   Abdominal Pain, Nausea, and Vomiting       HPI   Osei Wellington is a 29 y.o. male with PMH of HTN and h/o alcohol use who presented to the ED on 11/4/2024  c/o of Abdominal Pain, Nausea, and Vomiting     Per H&P, pt states his symptoms began 11/3 around 2pm in the afternoon immediately after eating one of his son's candy bars. Began having 10/10 abdominal pain right in the upper and center of his abdomen as well as nausea. Thought he could get through the symptoms at home however nausea and pain overwhelming so  he decided to seek medical attention.  Admits to recent alcohol binge on 11/2; says he drank 3 beatboxes (approximately 3 standard drinks). Daily drinker; last episode of pancreatitis was about 12/2023. Also admits to occasional marijuana use.     ED Course - Vital signs on admission he was AF, HR 77, hypertensive 184/128, and O2 sat 100% on room air. Lab work showed leukocytosis up to 16.24, bicarb 18, Transaminitis, elevated lipase 2015. Lactic level wnl. UDS +Cannabinoids. CT abdomen and pelvis concerning for acute pancreatitis. He was given Zofran 4mg, Saline bolus and Morphine 16mg. Pain still present so ED physician ordered Librium. Internal Medicine consulted for admission of acute pancreatitis.     Interval history:  BP elevated ranging (151-182) / () in last 24 hours. Afebrile; complaining of dysuria; denies nausea/vomiting. Endorses 7/10 abdominal pain despite 1mg dilaudid; fatigue and diaphoresis. Still not tolerating broth but jello ok. No anxiety or tremor noted on PE.     History    PMH:  Past Medical History:   Diagnosis Date    Pancreatitis 2020     Past Surgical History: History reviewed. No pertinent surgical history.  Family History: No family history on  file.  Social:   Social History     Tobacco Use    Smoking status: Never    Smokeless tobacco: Never   Substance Use Topics    Alcohol use: Yes     Comment: Binge drinker    Drug use: Yes     Types: Marijuana     Comment: Vape     Allergies: Review of patient's allergies indicates:  No Known Allergies  Home Medications   Prior to Admission medications    Medication Sig Start Date End Date Taking? Authorizing Provider   amLODIPine (NORVASC) 10 MG tablet Take 1 tablet (10 mg total) by mouth once daily. 23  Monet Forrester MD       Review of Systems   Review of Systems   Constitutional:  Positive for diaphoresis and malaise/fatigue. Negative for chills and fever.   Respiratory:  Negative for shortness of breath.    Cardiovascular:  Negative for chest pain.   Gastrointestinal:  Positive for abdominal pain. Negative for constipation, nausea and vomiting.        7/10 abdominal pain   Genitourinary:  Positive for dysuria.   Neurological:  Negative for tremors.        Vital Signs    BP  Min: 143/71  Max: 182/107  Temp  Av.1 °F (36.7 °C)  Min: 97.9 °F (36.6 °C)  Max: 98.7 °F (37.1 °C)  Pulse  Av.7  Min: 64  Max: 89  Resp  Av.9  Min: 17  Max: 20  SpO2  Av.3 %  Min: 97 %  Max: 100 %  Weight  Av.3 kg (183 lb 10.3 oz)  Min: 83.3 kg (183 lb 10.3 oz)  Max: 83.3 kg (183 lb 10.3 oz)  Body mass index is 23.58 kg/m².  I/O last 3 completed shifts:  In: 2625 [P.O.:177; IV Piggyback:3524]  Out: -     Physical Exam    Physical Exam  Vitals reviewed.   Constitutional:       General: He is not in acute distress.     Appearance: Normal appearance.      Comments: Pt laying in bed with slightly anxious affect possibly 2/2 to 7/10 pain   Cardiovascular:      Rate and Rhythm: Normal rate and regular rhythm.      Pulses: Normal pulses.      Heart sounds: Normal heart sounds.   Pulmonary:      Effort: Pulmonary effort is normal.      Breath sounds: Normal breath sounds.   Abdominal:      General: Abdomen is  "flat. There is no distension.      Tenderness: There is abdominal tenderness in the epigastric area and suprapubic area. There is no guarding.   Musculoskeletal:      Right lower leg: No edema.      Left lower leg: No edema.   Skin:     General: Skin is warm and dry.   Neurological:      General: No focal deficit present.      Mental Status: He is alert and oriented to person, place, and time.   Psychiatric:         Mood and Affect: Mood normal.         Behavior: Behavior normal.         Thought Content: Thought content normal.         Labs    Most Recent Data:  CBC:   Lab Results   Component Value Date    WBC 8.87 11/05/2024    HGB 14.1 11/05/2024    HCT 40.6 (L) 11/05/2024     11/05/2024    MCV 95.1 (H) 11/05/2024    RDW 14.3 11/05/2024     WBC Differential:   Recent Labs   Lab 11/04/24  0119 11/04/24  0610 11/05/24  0438   WBC 16.24* 13.15* 8.87   HGB 17.7 15.7 14.1   HCT 50.1 45.6 40.6*    182 133   MCV 92.1 93.6 95.1*     BMP:   Lab Results   Component Value Date     (L) 11/05/2024    K 3.8 11/05/2024     11/05/2024    CO2 26 11/05/2024    BUN 7.0 (L) 11/05/2024    CREATININE 0.75 11/05/2024    CALCIUM 8.4 11/05/2024    MG 1.70 11/05/2024    PHOS 2.5 11/05/2024     LFTs:   Lab Results   Component Value Date    ALBUMIN 3.1 (L) 11/05/2024    BILITOT 0.7 11/05/2024    AST 41 (H) 11/05/2024    ALKPHOS 60 11/05/2024    ALT 63 (H) 11/05/2024     Coags: No results found for: "INR", "PROTIME", "PTT"  FLP:   Lab Results   Component Value Date    CHOL 204 (H) 11/04/2024    HDL 96 (H) 11/04/2024    TRIG 80 11/04/2024     DM:   Lab Results   Component Value Date    HGBA1C 4.9 11/04/2024    HGBA1C 5.0 04/19/2021    CREATININE 0.75 11/05/2024     Thyroid:   Lab Results   Component Value Date    TSH 0.6212 04/19/2021      Anemia: No results found for: "IRON", "TIBC", "FERRITIN", "SATURATEDIRO"  No results found for: "OPXTTGTF91"  No results found for: "FOLATE"     Cardiac: No results found for: " ""TROPONINI", "CKTOTAL", "CKMB", "BNP"  Urinalysis:   Lab Results   Component Value Date    COLORU Light-Orange (A) 11/04/2024    NITRITE Negative 11/04/2024    KETONESU Trace (A) 04/18/2021    UROBILINOGEN 1+ (A) 11/04/2024    WBCUA 0-5 11/04/2024       Trended Lab Data:  Recent Labs   Lab 11/04/24  0119 11/04/24  0610 11/05/24  0438   WBC 16.24* 13.15* 8.87   HGB 17.7 15.7 14.1   HCT 50.1 45.6 40.6*    182 133   MCV 92.1 93.6 95.1*   RDW 13.9 14.2 14.3   * 137 135*   K 4.1 4.2 3.8    104 103   CO2 18* 24 26   BUN 11.3 10.5 7.0*   CREATININE 0.86 0.82 0.75   ALBUMIN 4.2 3.6 3.1*   BILITOT 1.2 0.9 0.7   AST 92* 69* 41*   ALKPHOS 93 81 60   * 96* 63*       Trended Cardiac Data:  No results for input(s): "TROPONINI", "CKTOTAL", "CKMB", "BNP" in the last 168 hours.    Microbiology Data:  Microbiology Results (last 7 days)       ** No results found for the last 168 hours. **          Imaging      Imaging Results              CT Abdomen Pelvis With IV Contrast NO Oral Contrast (Final result)  Result time 11/04/24 07:49:26      Final result by Petey Munson MD (11/04/24 07:49:26)                   Impression:      Compared to the prior study, the pancreas appears perisistently edematous with continued presence of pronounced surrounding peripancreatic fat stranding and with interval increase in the surrounding intraperitoneal fluid in the bilateral anterior pararenal spaces and paracolic gutters as well as in the perisplenic, perihepatic and pelvic spaces. This is consistent with severe pancreatitis. Correlate with clinical and laboratory findings as regards further evaluation and followup.  Of note prior study was 1 year prior so findings likely represent recurrent pancreatitis.      Electronically signed by: Petey Munson  Date:    11/04/2024  Time:    07:49               Narrative:    EXAMINATION:  CT ABDOMEN PELVIS WITH IV CONTRAST    CLINICAL HISTORY:  pancreatis appears " likely;    TECHNIQUE:  Multidetector IV contrast enhanced axial CT images of the abdomen and pelvis were obtained with coronal and sagittal reconstructions.    Automatic exposure control was utilized to reduce the patient's radiation dose.    DLP= 246    COMPARISON:  12/03/2023    FINDINGS:  Lines and Tubes: None.Thorax:Lungs: The visualized lung bases appear unremarkable. No focal infiltrate or consolidation is seen.Pleura: No effusions or thickening.Heart: The heart size is within normal limits.Abdomen:Abdominal Wall: No abdominal wall pathology is seen.Liver: Pronounced fatty infiltration of the liver is present. The liver otherwise appears unremarkable.Biliary System: No intrahepatic or extrahepatic biliary duct dilatation is seen.Gallbladder: The gallbladder appears unremarkable.Pancreas: The pancreas appears unremarkable. Compared to the prior study, the pancreas appears perisistently edematous with continued presence of pronounced surrounding peripancreatic fat stranding and with interval increase in the surrounding intraperitoneal fluid in the bilateral anterior pararenal spaces and paracolic gutters as well as in the perisplenic, perihepatic and pelvic spaces. This is consistent with severe pancreatitis.Spleen: The spleen appears unremarkable.Adrenals: The adrenal glands appear unremarkable.Kidneys: The kidneys appear unremarkable with no stones cysts masses or hydronephrosis.Aorta: The abdominal aorta appears unremarkable.IVC: Unremarkable.Bowel:Esophagus: The visualized esophagus appears unremarkable.Stomach: The stomach appears unremarkable.Duodenum: Unremarkable appearing duodenum.Small Bowel: The small bowel appears unremarkable.Colon: Nondistended.Appendix: The appendix appears unremarkable and is seen on Image 125, Series 2 through Image 110, Series 2.Peritoneum: No free intraperitoneal air is seen.Pelvis:Bladder: The bladder appears unremarkable.Male:Prostate gland: The prostate gland appears  unremarkable.Bony structures:Dorsal Spine: The visualized dorsal spine appears unremarkable.Bony Pelvis: The visualized bony structures of the pelvis appear unremarkable.                        Preliminary result by Lalo Rubin MD (11/04/24 02:40:03)                   Impression:    1. Compared to the prior study, the pancreas appears perisistently edematous with continued presence of pronounced surrounding peripancreatic fat stranding and with interval increase in the surrounding intraperitoneal fluid in the bilateral anterior pararenal spaces and paracolic gutters as well as in the perisplenic, perihepatic and pelvic spaces. This is consistent with severe pancreatitis. Correlate with clinical and laboratory findings as regards further evaluation and followup.  2. Details and other findings as discussed above.               Narrative:    START OF REPORT:  Technique: CT of the abdomen and pelvis was performed with axial images as well as sagittal and coronal reconstruction images with intravenous contrast.    Comparison: Comparison is with study dated 2023-12-03 04:21:55.    Clinical History: Suspected Pancreatitis,.    Dosage Information: Automated Exposure Control was utilized 246.28 mGy.cm.    Findings:  Lines and Tubes: None.  Thorax:  Lungs: The visualized lung bases appear unremarkable. No focal infiltrate or consolidation is seen.  Pleura: No effusions or thickening.  Heart: The heart size is within normal limits.  Abdomen:  Abdominal Wall: No abdominal wall pathology is seen.  Liver: Pronounced fatty infiltration of the liver is present. The liver otherwise appears unremarkable.  Biliary System: No intrahepatic or extrahepatic biliary duct dilatation is seen.  Gallbladder: The gallbladder appears unremarkable.  Pancreas: The pancreas appears unremarkable. Compared to the prior study, the pancreas appears perisistently edematous with continued presence of pronounced surrounding peripancreatic fat  stranding and with interval increase in the surrounding intraperitoneal fluid in the bilateral anterior pararenal spaces and paracolic gutters as well as in the perisplenic, perihepatic and pelvic spaces. This is consistent with severe pancreatitis.  Spleen: The spleen appears unremarkable.  Adrenals: The adrenal glands appear unremarkable.  Kidneys: The kidneys appear unremarkable with no stones cysts masses or hydronephrosis.  Aorta: The abdominal aorta appears unremarkable.  IVC: Unremarkable.  Bowel:  Esophagus: The visualized esophagus appears unremarkable.  Stomach: The stomach appears unremarkable.  Duodenum: Unremarkable appearing duodenum.  Small Bowel: The small bowel appears unremarkable.  Colon: Nondistended.  Appendix: The appendix appears unremarkable and is seen on Image 125, Series 2 through Image 110, Series 2.  Peritoneum: No free intraperitoneal air is seen.    Pelvis:  Bladder: The bladder appears unremarkable.  Male:  Prostate gland: The prostate gland appears unremarkable.    Bony structures:  Dorsal Spine: The visualized dorsal spine appears unremarkable.  Bony Pelvis: The visualized bony structures of the pelvis appear unremarkable.                                         Assessment and Plan     Acute Pancreatitis   Refractory pain (resolved)  H/O Daily Alcohol Use  hx alcohol; last drink reported 11/2  Lipase 2015, amylase 969 Lactate dehydrogenase 252   CT scan confirmed acute pancreatitis  Pt tolerating jello and popscicle but not tolerating beef broth  -adjust Hydromorphone 1mg from q4h to q6h  prn severe pain  -decrease IVF from 150 to 100 ml/hr  -advance diet as tolerated  -Zofran PRN  -CIWA precautions with Librium prn   -continue folic acid, thiamine, and multivitamin     Transaminitis (improving)  -AST 41, ALT 63  -Fatty infiltration seen on liver in CT scan   -Will continue to monitor; consider U/S if continues to worsen     Elevated BP   24 hour range (170-224) /  ()  -continue amlodipine 10 mg daily   -start Lisinopril 20 mg daily   -PRN Hydralazine and Labetalol      Glucosuria  Trace glucose on UA  -A1c 4.9  -stress hyperglycemia 2/2 to pancreatitis     Dysuria  -urinalysis reflux to culture     CODE STATUS: FULL  Access: Peripheral IV  Antibiotics: None  Diet: Clear Liquid; AAT  DVT Prophylaxis: Lovenox   GI Prophylaxis: None   Fluids: LR at 100 ml/hr        Disposition: Pt admitted for acute pancreatitis.Advancing diet as tolerated. Adjusting Dilaudid to Q6h from q4h; decreasing IVF from 150 to 100 ml/hr; starting lisinopril 20 mg.      Milton Rob MD  [facility]

## 2024-11-06 LAB
ALBUMIN SERPL-MCNC: 3 G/DL (ref 3.5–5)
ALBUMIN/GLOB SERPL: 1 RATIO (ref 1.1–2)
ALP SERPL-CCNC: 65 UNIT/L (ref 40–150)
ALT SERPL-CCNC: 51 UNIT/L (ref 0–55)
ANION GAP SERPL CALC-SCNC: 9 MEQ/L
AST SERPL-CCNC: 36 UNIT/L (ref 5–34)
BACTERIA #/AREA URNS AUTO: ABNORMAL /HPF
BASOPHILS # BLD AUTO: 0.04 X10(3)/MCL
BASOPHILS NFR BLD AUTO: 0.6 %
BILIRUB SERPL-MCNC: 0.7 MG/DL
BILIRUB UR QL STRIP.AUTO: NEGATIVE
BUN SERPL-MCNC: 5.8 MG/DL (ref 8.9–20.6)
CALCIUM SERPL-MCNC: 8.6 MG/DL (ref 8.4–10.2)
CHLORIDE SERPL-SCNC: 103 MMOL/L (ref 98–107)
CLARITY UR: CLEAR
CO2 SERPL-SCNC: 25 MMOL/L (ref 22–29)
COLOR UR AUTO: ABNORMAL
CREAT SERPL-MCNC: 0.71 MG/DL (ref 0.72–1.25)
CREAT/UREA NIT SERPL: 8
EOSINOPHIL # BLD AUTO: 0.3 X10(3)/MCL (ref 0–0.9)
EOSINOPHIL NFR BLD AUTO: 4.3 %
ERYTHROCYTE [DISTWIDTH] IN BLOOD BY AUTOMATED COUNT: 14.1 % (ref 11.5–17)
GFR SERPLBLD CREATININE-BSD FMLA CKD-EPI: >60 ML/MIN/1.73/M2
GLOBULIN SER-MCNC: 3 GM/DL (ref 2.4–3.5)
GLUCOSE SERPL-MCNC: 94 MG/DL (ref 74–100)
GLUCOSE UR QL STRIP: NORMAL
HCT VFR BLD AUTO: 39.6 % (ref 42–52)
HGB BLD-MCNC: 13.4 G/DL (ref 14–18)
HGB UR QL STRIP: NEGATIVE
HYALINE CASTS #/AREA URNS LPF: ABNORMAL /LPF
IMM GRANULOCYTES # BLD AUTO: 0.01 X10(3)/MCL (ref 0–0.04)
IMM GRANULOCYTES NFR BLD AUTO: 0.1 %
KETONES UR QL STRIP: NEGATIVE
LEUKOCYTE ESTERASE UR QL STRIP: NEGATIVE
LYMPHOCYTES # BLD AUTO: 0.93 X10(3)/MCL (ref 0.6–4.6)
LYMPHOCYTES NFR BLD AUTO: 13.2 %
MAGNESIUM SERPL-MCNC: 1.9 MG/DL (ref 1.6–2.6)
MCH RBC QN AUTO: 32.6 PG (ref 27–31)
MCHC RBC AUTO-ENTMCNC: 33.8 G/DL (ref 33–36)
MCV RBC AUTO: 96.4 FL (ref 80–94)
MONOCYTES # BLD AUTO: 0.76 X10(3)/MCL (ref 0.1–1.3)
MONOCYTES NFR BLD AUTO: 10.8 %
MUCOUS THREADS URNS QL MICRO: ABNORMAL /LPF
NEUTROPHILS # BLD AUTO: 4.99 X10(3)/MCL (ref 2.1–9.2)
NEUTROPHILS NFR BLD AUTO: 71 %
NITRITE UR QL STRIP: NEGATIVE
NRBC BLD AUTO-RTO: 0 %
PH UR STRIP: 7 [PH]
PHOSPHATE SERPL-MCNC: 3.1 MG/DL (ref 2.3–4.7)
PLATELET # BLD AUTO: 134 X10(3)/MCL (ref 130–400)
PLATELETS.RETICULATED NFR BLD AUTO: 7.1 % (ref 0.9–11.2)
PMV BLD AUTO: 11.9 FL (ref 7.4–10.4)
POTASSIUM SERPL-SCNC: 3.9 MMOL/L (ref 3.5–5.1)
PROT SERPL-MCNC: 6 GM/DL (ref 6.4–8.3)
PROT UR QL STRIP: ABNORMAL
RBC # BLD AUTO: 4.11 X10(6)/MCL (ref 4.7–6.1)
RBC #/AREA URNS AUTO: ABNORMAL /HPF
SODIUM SERPL-SCNC: 137 MMOL/L (ref 136–145)
SP GR UR STRIP.AUTO: 1.01 (ref 1–1.03)
SQUAMOUS #/AREA URNS LPF: ABNORMAL /HPF
UROBILINOGEN UR STRIP-ACNC: NORMAL
WBC # BLD AUTO: 7.03 X10(3)/MCL (ref 4.5–11.5)
WBC #/AREA URNS AUTO: ABNORMAL /HPF

## 2024-11-06 PROCEDURE — 83735 ASSAY OF MAGNESIUM: CPT

## 2024-11-06 PROCEDURE — 63600175 PHARM REV CODE 636 W HCPCS

## 2024-11-06 PROCEDURE — 25000003 PHARM REV CODE 250

## 2024-11-06 PROCEDURE — 11000001 HC ACUTE MED/SURG PRIVATE ROOM

## 2024-11-06 PROCEDURE — 94761 N-INVAS EAR/PLS OXIMETRY MLT: CPT

## 2024-11-06 PROCEDURE — 80053 COMPREHEN METABOLIC PANEL: CPT

## 2024-11-06 PROCEDURE — 81001 URINALYSIS AUTO W/SCOPE: CPT

## 2024-11-06 PROCEDURE — 84100 ASSAY OF PHOSPHORUS: CPT

## 2024-11-06 PROCEDURE — 85025 COMPLETE CBC W/AUTO DIFF WBC: CPT

## 2024-11-06 PROCEDURE — 36415 COLL VENOUS BLD VENIPUNCTURE: CPT

## 2024-11-06 RX ORDER — DOCUSATE SODIUM 100 MG
400 CAPSULE ORAL
Status: DISCONTINUED | OUTPATIENT
Start: 2024-11-06 | End: 2024-11-06

## 2024-11-06 RX ORDER — HYDROMORPHONE HYDROCHLORIDE 1 MG/ML
1 INJECTION, SOLUTION INTRAMUSCULAR; INTRAVENOUS; SUBCUTANEOUS EVERY 8 HOURS PRN
Status: DISCONTINUED | OUTPATIENT
Start: 2024-11-06 | End: 2024-11-07

## 2024-11-06 RX ORDER — DOCUSATE SODIUM 100 MG
100 CAPSULE ORAL
Status: DISCONTINUED | OUTPATIENT
Start: 2024-11-06 | End: 2024-11-07 | Stop reason: HOSPADM

## 2024-11-06 RX ORDER — HYDROMORPHONE HYDROCHLORIDE 1 MG/ML
0.5 INJECTION, SOLUTION INTRAMUSCULAR; INTRAVENOUS; SUBCUTANEOUS ONCE
Status: COMPLETED | OUTPATIENT
Start: 2024-11-06 | End: 2024-11-06

## 2024-11-06 RX ADMIN — THIAMINE HCL TAB 100 MG 100 MG: 100 TAB at 09:11

## 2024-11-06 RX ADMIN — THERA TABS 1 TABLET: TAB at 09:11

## 2024-11-06 RX ADMIN — HYDROMORPHONE HYDROCHLORIDE 1 MG: 1 INJECTION, SOLUTION INTRAMUSCULAR; INTRAVENOUS; SUBCUTANEOUS at 09:11

## 2024-11-06 RX ADMIN — HYDROMORPHONE HYDROCHLORIDE 1 MG: 1 INJECTION, SOLUTION INTRAMUSCULAR; INTRAVENOUS; SUBCUTANEOUS at 05:11

## 2024-11-06 RX ADMIN — AMLODIPINE BESYLATE 10 MG: 10 TABLET ORAL at 09:11

## 2024-11-06 RX ADMIN — HYDROMORPHONE HYDROCHLORIDE 1 MG: 1 INJECTION, SOLUTION INTRAMUSCULAR; INTRAVENOUS; SUBCUTANEOUS at 03:11

## 2024-11-06 RX ADMIN — Medication 100 ML: at 03:11

## 2024-11-06 RX ADMIN — Medication 100 ML: at 07:11

## 2024-11-06 RX ADMIN — ACETAMINOPHEN 325MG 650 MG: 325 TABLET ORAL at 03:11

## 2024-11-06 RX ADMIN — FOLIC ACID 1 MG: 1 TABLET ORAL at 09:11

## 2024-11-06 RX ADMIN — LISINOPRIL 20 MG: 10 TABLET ORAL at 09:11

## 2024-11-06 RX ADMIN — HYDROMORPHONE HYDROCHLORIDE 0.5 MG: 1 INJECTION, SOLUTION INTRAMUSCULAR; INTRAVENOUS; SUBCUTANEOUS at 05:11

## 2024-11-06 RX ADMIN — SODIUM CHLORIDE: 9 INJECTION, SOLUTION INTRAVENOUS at 06:11

## 2024-11-06 RX ADMIN — SODIUM CHLORIDE: 9 INJECTION, SOLUTION INTRAVENOUS at 05:11

## 2024-11-06 RX ADMIN — ENOXAPARIN SODIUM 40 MG: 40 INJECTION SUBCUTANEOUS at 05:11

## 2024-11-06 NOTE — PROGRESS NOTES
[facility] Wards Progress Note       Resident Team: [facility] List 2  Attending Physician: Radha Vázquez MD  Resident: Landy Franco DO  Intern: Milton Rob MD     Date of Admit: 11/4/2024  Chief Complaint   Abdominal Pain, Nausea, and Vomiting       HPI   Osei Wellington is a 29 y.o. male with PMH of HTN and h/o alcohol use who presented to the ED on 11/4/2024  c/o of Abdominal Pain, Nausea, and Vomiting     Per H&P, pt states his symptoms began 11/3 around 2pm in the afternoon immediately after eating one of his son's candy bars. Began having 10/10 abdominal pain right in the upper and center of his abdomen as well as nausea. Thought he could get through the symptoms at home however nausea and pain overwhelming so  he decided to seek medical attention.  Admits to recent alcohol binge on 11/2; says he drank 3 beatboxes (approximately 3 standard drinks). Daily drinker; last episode of pancreatitis was about 12/2023. Also admits to occasional marijuana use.     ED Course - Vital signs on admission he was AF, HR 77, hypertensive 184/128, and O2 sat 100% on room air. Lab work showed leukocytosis up to 16.24, bicarb 18, Transaminitis, elevated lipase 2015. Lactic level wnl. UDS +Cannabinoids. CT abdomen and pelvis concerning for acute pancreatitis. He was given Zofran 4mg, Saline bolus and Morphine 16mg. Pain still present so ED physician ordered Librium. Internal Medicine consulted for admission of acute pancreatitis.     Interval history:  BP elevated ranging (139-158) / (77-90) in last 24 hours. Afebrile;Tried eating chicken yesterday for lunch then subsequently felt ill, dry-heaving 3-4 episodes and with epigastric pain at level similar to day of admission.  Also states he is unable to ambulate without abdominal pain.     History    PMH:  Past Medical History:   Diagnosis Date    Pancreatitis 2020     Past Surgical History: History reviewed. No pertinent surgical history.  Family History: No family  history on file.  Social:   Social History     Tobacco Use    Smoking status: Never    Smokeless tobacco: Never   Substance Use Topics    Alcohol use: Yes     Comment: Binge drinker    Drug use: Yes     Types: Marijuana     Comment: Vape     Allergies: Review of patient's allergies indicates:  No Known Allergies  Home Medications   Prior to Admission medications    Medication Sig Start Date End Date Taking? Authorizing Provider   amLODIPine (NORVASC) 10 MG tablet Take 1 tablet (10 mg total) by mouth once daily. 23  Monet Forrester MD       Review of Systems   Review of Systems   Constitutional:  Negative for chills and fever.   Respiratory:  Negative for shortness of breath.    Cardiovascular:  Negative for chest pain.   Gastrointestinal:  Positive for abdominal pain.        (-)nausea (+) Dry-heaving 3-4 times s/p attempt at eating solid food    Genitourinary:  Negative for dysuria.        Vital Signs    BP  Min: 139/80  Max: 158/77  Temp  Av.5 °F (36.9 °C)  Min: 98.1 °F (36.7 °C)  Max: 99.1 °F (37.3 °C)  Pulse  Av.7  Min: 57  Max: 102  Resp  Av.9  Min: 18  Max: 20  SpO2  Av %  Min: 96 %  Max: 99 %  Body mass index is 23.58 kg/m².  I/O last 3 completed shifts:  In: 1560 [P.O.:360; I.V.:1200]  Out: 500 [Urine:500]    Physical Exam    Physical Exam  Constitutional:       General: He is not in acute distress.     Appearance: Normal appearance.   Cardiovascular:      Rate and Rhythm: Normal rate and regular rhythm.      Pulses: Normal pulses.      Heart sounds: Normal heart sounds.   Pulmonary:      Effort: Pulmonary effort is normal.      Breath sounds: Normal breath sounds. No wheezing.   Abdominal:      Comments: Epigastric tenderness; normal bowel sounds   Musculoskeletal:      Right lower leg: No edema.      Left lower leg: No edema.   Skin:     General: Skin is warm and dry.   Neurological:      General: No focal deficit present.      Mental Status: He is alert and oriented to  person, place, and time.   Psychiatric:         Mood and Affect: Mood normal.         Behavior: Behavior normal.         Thought Content: Thought content normal.         Labs    Most Recent Data:  CBC:   Lab Results   Component Value Date    WBC 7.03 11/06/2024    HGB 13.4 (L) 11/06/2024    HCT 39.6 (L) 11/06/2024     11/06/2024    MCV 96.4 (H) 11/06/2024    RDW 14.1 11/06/2024     WBC Differential:   Recent Labs   Lab 11/04/24  0119 11/04/24  0610 11/05/24  0438 11/06/24  0431   WBC 16.24* 13.15* 8.87 7.03   HGB 17.7 15.7 14.1 13.4*   HCT 50.1 45.6 40.6* 39.6*    182 133 134   MCV 92.1 93.6 95.1* 96.4*     BMP:   Lab Results   Component Value Date     11/06/2024    K 3.9 11/06/2024     11/06/2024    CO2 25 11/06/2024    BUN 5.8 (L) 11/06/2024    CREATININE 0.71 (L) 11/06/2024    CALCIUM 8.6 11/06/2024    MG 1.90 11/06/2024    PHOS 3.1 11/06/2024     LFTs:   Lab Results   Component Value Date    ALBUMIN 3.0 (L) 11/06/2024    BILITOT 0.7 11/06/2024    AST 36 (H) 11/06/2024    ALKPHOS 65 11/06/2024    ALT 51 11/06/2024       FLP:   Lab Results   Component Value Date    CHOL 204 (H) 11/04/2024    HDL 96 (H) 11/04/2024    TRIG 80 11/04/2024     DM:   Lab Results   Component Value Date    HGBA1C 4.9 11/04/2024    HGBA1C 5.0 04/19/2021    CREATININE 0.71 (L) 11/06/2024     Thyroid:   Lab Results   Component Value Date    TSH 0.6212 04/19/2021        Urinalysis:   Lab Results   Component Value Date    COLORU Light-Yellow 11/06/2024    NITRITE Negative 11/06/2024    KETONESU Trace (A) 04/18/2021    UROBILINOGEN Normal 11/06/2024    WBCUA 0-5 11/06/2024       Trended Lab Data:  Recent Labs   Lab 11/04/24  0610 11/05/24 0438 11/06/24 0431   WBC 13.15* 8.87 7.03   HGB 15.7 14.1 13.4*   HCT 45.6 40.6* 39.6*    133 134   MCV 93.6 95.1* 96.4*   RDW 14.2 14.3 14.1    135* 137   K 4.2 3.8 3.9    103 103   CO2 24 26 25   BUN 10.5 7.0* 5.8*   CREATININE 0.82 0.75 0.71*   ALBUMIN 3.6 3.1*  3.0*   BILITOT 0.9 0.7 0.7   AST 69* 41* 36*   ALKPHOS 81 60 65   ALT 96* 63* 51         Imaging      Imaging Results              CT Abdomen Pelvis With IV Contrast NO Oral Contrast (Final result)  Result time 11/04/24 07:49:26      Final result by Petey Munson MD (11/04/24 07:49:26)                   Impression:      Compared to the prior study, the pancreas appears perisistently edematous with continued presence of pronounced surrounding peripancreatic fat stranding and with interval increase in the surrounding intraperitoneal fluid in the bilateral anterior pararenal spaces and paracolic gutters as well as in the perisplenic, perihepatic and pelvic spaces. This is consistent with severe pancreatitis. Correlate with clinical and laboratory findings as regards further evaluation and followup.  Of note prior study was 1 year prior so findings likely represent recurrent pancreatitis.      Electronically signed by: Petey Munson  Date:    11/04/2024  Time:    07:49               Narrative:    EXAMINATION:  CT ABDOMEN PELVIS WITH IV CONTRAST    CLINICAL HISTORY:  pancreatis appears likely;    TECHNIQUE:  Multidetector IV contrast enhanced axial CT images of the abdomen and pelvis were obtained with coronal and sagittal reconstructions.    Automatic exposure control was utilized to reduce the patient's radiation dose.    DLP= 246    COMPARISON:  12/03/2023    FINDINGS:  Lines and Tubes: None.Thorax:Lungs: The visualized lung bases appear unremarkable. No focal infiltrate or consolidation is seen.Pleura: No effusions or thickening.Heart: The heart size is within normal limits.Abdomen:Abdominal Wall: No abdominal wall pathology is seen.Liver: Pronounced fatty infiltration of the liver is present. The liver otherwise appears unremarkable.Biliary System: No intrahepatic or extrahepatic biliary duct dilatation is seen.Gallbladder: The gallbladder appears unremarkable.Pancreas: The pancreas appears unremarkable.  Compared to the prior study, the pancreas appears perisistently edematous with continued presence of pronounced surrounding peripancreatic fat stranding and with interval increase in the surrounding intraperitoneal fluid in the bilateral anterior pararenal spaces and paracolic gutters as well as in the perisplenic, perihepatic and pelvic spaces. This is consistent with severe pancreatitis.Spleen: The spleen appears unremarkable.Adrenals: The adrenal glands appear unremarkable.Kidneys: The kidneys appear unremarkable with no stones cysts masses or hydronephrosis.Aorta: The abdominal aorta appears unremarkable.IVC: Unremarkable.Bowel:Esophagus: The visualized esophagus appears unremarkable.Stomach: The stomach appears unremarkable.Duodenum: Unremarkable appearing duodenum.Small Bowel: The small bowel appears unremarkable.Colon: Nondistended.Appendix: The appendix appears unremarkable and is seen on Image 125, Series 2 through Image 110, Series 2.Peritoneum: No free intraperitoneal air is seen.Pelvis:Bladder: The bladder appears unremarkable.Male:Prostate gland: The prostate gland appears unremarkable.Bony structures:Dorsal Spine: The visualized dorsal spine appears unremarkable.Bony Pelvis: The visualized bony structures of the pelvis appear unremarkable.                        Preliminary result by Lalo Rubin MD (11/04/24 02:40:03)                   Impression:    1. Compared to the prior study, the pancreas appears perisistently edematous with continued presence of pronounced surrounding peripancreatic fat stranding and with interval increase in the surrounding intraperitoneal fluid in the bilateral anterior pararenal spaces and paracolic gutters as well as in the perisplenic, perihepatic and pelvic spaces. This is consistent with severe pancreatitis. Correlate with clinical and laboratory findings as regards further evaluation and followup.  2. Details and other findings as discussed above.                Narrative:    START OF REPORT:  Technique: CT of the abdomen and pelvis was performed with axial images as well as sagittal and coronal reconstruction images with intravenous contrast.    Comparison: Comparison is with study dated 2023-12-03 04:21:55.    Clinical History: Suspected Pancreatitis,.    Dosage Information: Automated Exposure Control was utilized 246.28 mGy.cm.    Findings:  Lines and Tubes: None.  Thorax:  Lungs: The visualized lung bases appear unremarkable. No focal infiltrate or consolidation is seen.  Pleura: No effusions or thickening.  Heart: The heart size is within normal limits.  Abdomen:  Abdominal Wall: No abdominal wall pathology is seen.  Liver: Pronounced fatty infiltration of the liver is present. The liver otherwise appears unremarkable.  Biliary System: No intrahepatic or extrahepatic biliary duct dilatation is seen.  Gallbladder: The gallbladder appears unremarkable.  Pancreas: The pancreas appears unremarkable. Compared to the prior study, the pancreas appears perisistently edematous with continued presence of pronounced surrounding peripancreatic fat stranding and with interval increase in the surrounding intraperitoneal fluid in the bilateral anterior pararenal spaces and paracolic gutters as well as in the perisplenic, perihepatic and pelvic spaces. This is consistent with severe pancreatitis.  Spleen: The spleen appears unremarkable.  Adrenals: The adrenal glands appear unremarkable.  Kidneys: The kidneys appear unremarkable with no stones cysts masses or hydronephrosis.  Aorta: The abdominal aorta appears unremarkable.  IVC: Unremarkable.  Bowel:  Esophagus: The visualized esophagus appears unremarkable.  Stomach: The stomach appears unremarkable.  Duodenum: Unremarkable appearing duodenum.  Small Bowel: The small bowel appears unremarkable.  Colon: Nondistended.  Appendix: The appendix appears unremarkable and is seen on Image 125, Series 2 through Image 110, Series  2.  Peritoneum: No free intraperitoneal air is seen.    Pelvis:  Bladder: The bladder appears unremarkable.  Male:  Prostate gland: The prostate gland appears unremarkable.    Bony structures:  Dorsal Spine: The visualized dorsal spine appears unremarkable.  Bony Pelvis: The visualized bony structures of the pelvis appear unremarkable.                                         Assessment and Plan     Acute Pancreatitis   Refractory pain (resolved)  H/O Daily Alcohol Use  hx alcohol; last drink reported 11/2  Lipase 2015, amylase 969 Lactate dehydrogenase 252   CT scan confirmed acute pancreatitis  Pt tolerating jello and popscicle but with 3-4 episodes of dry heaving and acute epigastric pain after trying to eat solids 11/5 for lunch.  -resume clear liquid diet  -adjust Hydromorphone 1mg from q6h to q8h  prn severe pain   -pt reports improvement in pain 11/6   -continue  ml/hr  -pedialyte oral solution 100 ml q4h   -pt reports clear urine  -advance diet as tolerated  -Zofran PRN  -CIWA precautions with Librium prn   -continue folic acid, thiamine, and multivitamin     Transaminitis (improving)  -AST 36, ALT 51  -Fatty infiltration seen on liver in CT scan   -Will continue to monitor; consider U/S  worsens     Elevated BP   BP  Min: 139/80  Max: 158/77  -continue amlodipine 10 mg daily   -start Lisinopril 20 mg daily   -PRN Hydralazine and Labetalol   -recommend outpatient follow up with PCP to manage HTN diagnosis and med regimen     Glucosuria  Trace glucose on UA  -A1c 4.9  -stress hyperglycemia 2/2 to pancreatitis      Dysuria  -urinalysis negative for WBC     CODE STATUS: FULL  Access: Peripheral IV  Antibiotics: None  Diet: Clear Liquid; AAT  DVT Prophylaxis: Lovenox   GI Prophylaxis: None   Fluids: LR at 100 ml/hr        Disposition: Pt admitted for acute pancreatitis.Reporting epigastric pain upon ambulation and Dry heaving and epigastric pain when attempts to eat solid foods. Encourage patient to try  broth before solids. Pain well controlled on current regimen. Barriers to discharge are inability to tolerate PO diet and pain on ambulation.     Milton Rob MD  \[facility]\"" Family Medicine [facility]

## 2024-11-07 VITALS
WEIGHT: 183.63 LBS | RESPIRATION RATE: 18 BRPM | SYSTOLIC BLOOD PRESSURE: 137 MMHG | OXYGEN SATURATION: 100 % | TEMPERATURE: 98 F | HEART RATE: 69 BPM | BODY MASS INDEX: 23.57 KG/M2 | HEIGHT: 74 IN | DIASTOLIC BLOOD PRESSURE: 71 MMHG

## 2024-11-07 PROBLEM — R10.13 EPIGASTRIC PAIN: Status: RESOLVED | Noted: 2023-01-13 | Resolved: 2024-11-07

## 2024-11-07 PROBLEM — K85.90 ACUTE PANCREATITIS: Status: RESOLVED | Noted: 2023-01-13 | Resolved: 2024-11-07

## 2024-11-07 LAB
ALBUMIN SERPL-MCNC: 3.3 G/DL (ref 3.5–5)
ALBUMIN/GLOB SERPL: 1 RATIO (ref 1.1–2)
ALP SERPL-CCNC: 77 UNIT/L (ref 40–150)
ALT SERPL-CCNC: 64 UNIT/L (ref 0–55)
ANION GAP SERPL CALC-SCNC: 10 MEQ/L
AST SERPL-CCNC: 64 UNIT/L (ref 5–34)
BASOPHILS # BLD AUTO: 0.05 X10(3)/MCL
BASOPHILS NFR BLD AUTO: 1 %
BILIRUB SERPL-MCNC: 0.6 MG/DL
BUN SERPL-MCNC: 5 MG/DL (ref 8.9–20.6)
CALCIUM SERPL-MCNC: 8.9 MG/DL (ref 8.4–10.2)
CHLORIDE SERPL-SCNC: 103 MMOL/L (ref 98–107)
CO2 SERPL-SCNC: 24 MMOL/L (ref 22–29)
CREAT SERPL-MCNC: 0.69 MG/DL (ref 0.72–1.25)
CREAT/UREA NIT SERPL: 7
EOSINOPHIL # BLD AUTO: 0.26 X10(3)/MCL (ref 0–0.9)
EOSINOPHIL NFR BLD AUTO: 5.3 %
ERYTHROCYTE [DISTWIDTH] IN BLOOD BY AUTOMATED COUNT: 13.4 % (ref 11.5–17)
GFR SERPLBLD CREATININE-BSD FMLA CKD-EPI: >60 ML/MIN/1.73/M2
GLOBULIN SER-MCNC: 3.3 GM/DL (ref 2.4–3.5)
GLUCOSE SERPL-MCNC: 90 MG/DL (ref 74–100)
HCT VFR BLD AUTO: 38 % (ref 42–52)
HGB BLD-MCNC: 13.2 G/DL (ref 14–18)
IMM GRANULOCYTES # BLD AUTO: 0.02 X10(3)/MCL (ref 0–0.04)
IMM GRANULOCYTES NFR BLD AUTO: 0.4 %
LYMPHOCYTES # BLD AUTO: 0.78 X10(3)/MCL (ref 0.6–4.6)
LYMPHOCYTES NFR BLD AUTO: 15.8 %
MAGNESIUM SERPL-MCNC: 2.1 MG/DL (ref 1.6–2.6)
MCH RBC QN AUTO: 32.6 PG (ref 27–31)
MCHC RBC AUTO-ENTMCNC: 34.7 G/DL (ref 33–36)
MCV RBC AUTO: 93.8 FL (ref 80–94)
MONOCYTES # BLD AUTO: 0.69 X10(3)/MCL (ref 0.1–1.3)
MONOCYTES NFR BLD AUTO: 14 %
NEUTROPHILS # BLD AUTO: 3.13 X10(3)/MCL (ref 2.1–9.2)
NEUTROPHILS NFR BLD AUTO: 63.5 %
NRBC BLD AUTO-RTO: 0 %
PHOSPHATE SERPL-MCNC: 3.6 MG/DL (ref 2.3–4.7)
PLATELET # BLD AUTO: 152 X10(3)/MCL (ref 130–400)
PMV BLD AUTO: 11 FL (ref 7.4–10.4)
POTASSIUM SERPL-SCNC: 3.4 MMOL/L (ref 3.5–5.1)
PROT SERPL-MCNC: 6.6 GM/DL (ref 6.4–8.3)
RBC # BLD AUTO: 4.05 X10(6)/MCL (ref 4.7–6.1)
SODIUM SERPL-SCNC: 137 MMOL/L (ref 136–145)
WBC # BLD AUTO: 4.93 X10(3)/MCL (ref 4.5–11.5)

## 2024-11-07 PROCEDURE — 80053 COMPREHEN METABOLIC PANEL: CPT

## 2024-11-07 PROCEDURE — 25000003 PHARM REV CODE 250

## 2024-11-07 PROCEDURE — 83735 ASSAY OF MAGNESIUM: CPT

## 2024-11-07 PROCEDURE — 94761 N-INVAS EAR/PLS OXIMETRY MLT: CPT

## 2024-11-07 PROCEDURE — 85025 COMPLETE CBC W/AUTO DIFF WBC: CPT

## 2024-11-07 PROCEDURE — 63600175 PHARM REV CODE 636 W HCPCS

## 2024-11-07 PROCEDURE — 84100 ASSAY OF PHOSPHORUS: CPT

## 2024-11-07 PROCEDURE — 36415 COLL VENOUS BLD VENIPUNCTURE: CPT

## 2024-11-07 RX ORDER — LISINOPRIL 10 MG/1
40 TABLET ORAL DAILY
Status: DISCONTINUED | OUTPATIENT
Start: 2024-11-08 | End: 2024-11-07 | Stop reason: HOSPADM

## 2024-11-07 RX ORDER — HYDRALAZINE HYDROCHLORIDE 20 MG/ML
10 INJECTION INTRAMUSCULAR; INTRAVENOUS EVERY 4 HOURS PRN
Status: DISCONTINUED | OUTPATIENT
Start: 2024-11-07 | End: 2024-11-07 | Stop reason: HOSPADM

## 2024-11-07 RX ORDER — LISINOPRIL 40 MG/1
40 TABLET ORAL DAILY
Qty: 90 TABLET | Refills: 3 | Status: SHIPPED | OUTPATIENT
Start: 2024-11-08 | End: 2025-11-08

## 2024-11-07 RX ORDER — LISINOPRIL 10 MG/1
20 TABLET ORAL ONCE
Status: COMPLETED | OUTPATIENT
Start: 2024-11-07 | End: 2024-11-07

## 2024-11-07 RX ADMIN — THERA TABS 1 TABLET: TAB at 08:11

## 2024-11-07 RX ADMIN — Medication 100 ML: at 08:11

## 2024-11-07 RX ADMIN — HYDROMORPHONE HYDROCHLORIDE 1 MG: 1 INJECTION, SOLUTION INTRAMUSCULAR; INTRAVENOUS; SUBCUTANEOUS at 04:11

## 2024-11-07 RX ADMIN — LISINOPRIL 20 MG: 10 TABLET ORAL at 10:11

## 2024-11-07 RX ADMIN — THIAMINE HCL TAB 100 MG 100 MG: 100 TAB at 08:11

## 2024-11-07 RX ADMIN — LISINOPRIL 20 MG: 10 TABLET ORAL at 08:11

## 2024-11-07 RX ADMIN — FOLIC ACID 1 MG: 1 TABLET ORAL at 08:11

## 2024-11-07 RX ADMIN — SODIUM CHLORIDE: 9 INJECTION, SOLUTION INTRAVENOUS at 06:11

## 2024-11-07 RX ADMIN — SODIUM CHLORIDE: 9 INJECTION, SOLUTION INTRAVENOUS at 04:11

## 2024-11-07 RX ADMIN — AMLODIPINE BESYLATE 10 MG: 10 TABLET ORAL at 08:11

## 2024-11-07 RX ADMIN — Medication 100 ML: at 11:11

## 2024-11-07 NOTE — PLAN OF CARE
Problem: Adult Inpatient Plan of Care  Goal: Plan of Care Review  11/7/2024 1326 by Edilma Bill RN  Outcome: Met  11/7/2024 1122 by Edilma Bill RN  Outcome: Progressing  Goal: Patient-Specific Goal (Individualized)  11/7/2024 1326 by Edilma Bill RN  Outcome: Met  11/7/2024 1122 by Edilma Bill RN  Outcome: Progressing  Goal: Absence of Hospital-Acquired Illness or Injury  11/7/2024 1326 by Edilma Bill RN  Outcome: Met  11/7/2024 1122 by Edilma Bill RN  Outcome: Progressing  Goal: Optimal Comfort and Wellbeing  11/7/2024 1326 by Edilma Bill RN  Outcome: Met  11/7/2024 1122 by Edilma Bill RN  Outcome: Progressing  Goal: Readiness for Transition of Care  11/7/2024 1326 by Edilma Bill RN  Outcome: Met  11/7/2024 1122 by Edilma Bill RN  Outcome: Progressing     Problem: Pain Acute  Goal: Optimal Pain Control and Function  11/7/2024 1326 by Edilma Bill RN  Outcome: Met  11/7/2024 1122 by Edilma Bill RN  Outcome: Progressing     Problem: Pancreatitis  Goal: Fluid and Electrolyte Balance  11/7/2024 1326 by Edilma Bill RN  Outcome: Met  11/7/2024 1122 by Edilma Bill RN  Outcome: Progressing  Goal: Absence of Infection Signs and Symptoms  11/7/2024 1326 by Edilma Bill RN  Outcome: Met  11/7/2024 1122 by Edilma Bill RN  Outcome: Progressing  Goal: Optimal Nutrition Delivery  11/7/2024 1326 by Edilma Bill RN  Outcome: Met  11/7/2024 1122 by Edilma Bill RN  Outcome: Progressing  Goal: Optimal Pain Control and Function  11/7/2024 1326 by Edilma Bill RN  Outcome: Met  11/7/2024 1122 by Edilma Bill RN  Outcome: Progressing  Goal: Effective Oxygenation and Ventilation  11/7/2024 1326 by Edilma Bill RN  Outcome: Met  11/7/2024 1122 by Edilma Bill RN  Outcome: Progressing     Problem: Pain Acute  Goal: Optimal Pain Control and Function  11/7/2024 1326 by Edilma Bill RN  Outcome: Met  11/7/2024 1122 by Edilma Bill RN  Outcome: Progressing

## 2024-11-07 NOTE — DISCHARGE SUMMARY
[facility] Discharge Summary     Admitting Physician: Radha Vázquez MD  Attending Physician: Radha Vázquez MD  Date of Admit: 11/4/2024  Date of Discharge: 11/7/2024    Condition: Stable  Outcome: Condition has improved and patient is now ready for discharge.  DISPOSITION: Home or Self Care    Discharge Diagnoses     Patient Active Problem List   Diagnosis   (none) - all problems resolved or deleted       Principal Problem:  Acute pancreatitis    Consultants and Procedures     Consultants:  IP CONSULT TO HOSPITAL MEDICINE    Procedures:   * No surgery found *     Brief Admission History      PT admitted for acute pancreatitis and refractory pain. Pt also with persistently elevated blood pressure in hospital.     Hospital Course with Pertinent Findings     CT scan confirming acute pancreatitis. Pt put on clear liquid diet, given fluid bolus and IVF with eventual taper. Refractory pain treated with Dilaudid PRN and also successfully weaned by 11/07. Successful transition from clear liquid diet to  soft solid foods and pt able to take medications PO  and reports resolution of epigastric pain, nausea and vomiting. Started on Lisinopril 20 mg and titrated to 40 mg daily with meds to bed before discharge.     Discharge physical exam:  Physical Exam  Constitutional:       General: He is not in acute distress.     Appearance: Normal appearance. He is not toxic-appearing.   Cardiovascular:      Rate and Rhythm: Normal rate and regular rhythm.      Pulses: Normal pulses.      Heart sounds: Normal heart sounds.   Pulmonary:      Effort: Pulmonary effort is normal.      Breath sounds: Normal breath sounds.   Abdominal:      General: Abdomen is flat. There is no distension.      Palpations: Abdomen is soft.      Tenderness: There is no abdominal tenderness. There is no guarding.   Musculoskeletal:      Right lower leg: No edema.      Left lower leg: No edema.   Skin:     General: Skin is warm and dry.    Neurological:      General: No focal deficit present.      Mental Status: He is alert and oriented to person, place, and time.   Psychiatric:         Mood and Affect: Mood normal.         Behavior: Behavior normal.         Thought Content: Thought content normal.           Discharge Medications        Medication List        START taking these medications      amLODIPine 10 MG tablet  Commonly known as: NORVASC  Take 1 tablet (10 mg total) by mouth once daily.     lisinopriL 40 MG tablet  Commonly known as: PRINIVIL,ZESTRIL  Take 1 tablet (40 mg total) by mouth once daily.  Start taking on: November 8, 2024               Where to Get Your Medications        These medications were sent to [address] [address]      Phone: [phone]   lisinopriL 40 MG tablet         Discharge Information:     Pt is cleared to resume scheduled home medications and given new medications with meds to bed.  Strict ED precautions given; pt expressed understanding.   Follow up with PCP in 2-3 weeks.     Milton Rob MD  \[facility]\"" Family Medicine [facility]

## 2024-11-07 NOTE — PLAN OF CARE
Problem: Adult Inpatient Plan of Care  Goal: Plan of Care Review  Outcome: Progressing  Goal: Patient-Specific Goal (Individualized)  Outcome: Progressing  Goal: Absence of Hospital-Acquired Illness or Injury  Outcome: Progressing  Goal: Optimal Comfort and Wellbeing  Outcome: Progressing  Goal: Readiness for Transition of Care  Outcome: Progressing     Problem: Pain Acute  Goal: Optimal Pain Control and Function  Outcome: Progressing     Problem: Pancreatitis  Goal: Fluid and Electrolyte Balance  Outcome: Progressing  Goal: Absence of Infection Signs and Symptoms  Outcome: Progressing  Goal: Optimal Nutrition Delivery  Outcome: Progressing  Goal: Optimal Pain Control and Function  Outcome: Progressing  Goal: Effective Oxygenation and Ventilation  Outcome: Progressing     Problem: Skin Injury Risk Increased  Goal: Skin Health and Integrity  Outcome: Progressing

## 2024-12-29 ENCOUNTER — HOSPITAL ENCOUNTER (EMERGENCY)
Facility: HOSPITAL | Age: 29
Discharge: HOME | End: 2024-12-29
Attending: EMERGENCY MEDICINE
Payer: MEDICAID

## 2024-12-29 VITALS
TEMPERATURE: 98 F | BODY MASS INDEX: 23.1 KG/M2 | OXYGEN SATURATION: 99 % | HEART RATE: 64 BPM | RESPIRATION RATE: 16 BRPM | SYSTOLIC BLOOD PRESSURE: 161 MMHG | WEIGHT: 180 LBS | HEIGHT: 74 IN | DIASTOLIC BLOOD PRESSURE: 97 MMHG

## 2024-12-29 DIAGNOSIS — K86.0 ALCOHOL-INDUCED CHRONIC PANCREATITIS: Primary | ICD-10-CM

## 2024-12-29 LAB
ALBUMIN SERPL-MCNC: 4.6 G/DL (ref 3.5–5)
ALBUMIN/GLOB SERPL: 1.2 RATIO (ref 1.1–2)
ALP SERPL-CCNC: 113 UNIT/L (ref 40–150)
ALT SERPL-CCNC: 126 UNIT/L (ref 0–55)
ANION GAP SERPL CALC-SCNC: 12 MEQ/L
AST SERPL-CCNC: 138 UNIT/L (ref 5–34)
BACTERIA #/AREA URNS AUTO: ABNORMAL /HPF
BASOPHILS # BLD AUTO: 0.04 X10(3)/MCL
BASOPHILS NFR BLD AUTO: 0.8 %
BILIRUB SERPL-MCNC: 0.9 MG/DL
BILIRUB UR QL STRIP.AUTO: ABNORMAL
BNP BLD-MCNC: 12.8 PG/ML
BUN SERPL-MCNC: 11 MG/DL (ref 8.9–20.6)
CALCIUM SERPL-MCNC: 10.3 MG/DL (ref 8.4–10.2)
CHLORIDE SERPL-SCNC: 98 MMOL/L (ref 98–107)
CLARITY UR: CLEAR
CO2 SERPL-SCNC: 21 MMOL/L (ref 22–29)
COLOR UR AUTO: YELLOW
CREAT SERPL-MCNC: 0.81 MG/DL (ref 0.72–1.25)
CREAT/UREA NIT SERPL: 14
EOSINOPHIL # BLD AUTO: 0.07 X10(3)/MCL (ref 0–0.9)
EOSINOPHIL NFR BLD AUTO: 1.3 %
ERYTHROCYTE [DISTWIDTH] IN BLOOD BY AUTOMATED COUNT: 12.5 % (ref 11.5–17)
ETHANOL SERPL-MCNC: <10 MG/DL
GFR SERPLBLD CREATININE-BSD FMLA CKD-EPI: >60 ML/MIN/1.73/M2
GLOBULIN SER-MCNC: 3.8 GM/DL (ref 2.4–3.5)
GLUCOSE SERPL-MCNC: 93 MG/DL (ref 74–100)
GLUCOSE UR QL STRIP: NORMAL
HCT VFR BLD AUTO: 45.7 % (ref 42–52)
HGB BLD-MCNC: 15.9 G/DL (ref 14–18)
HGB UR QL STRIP: NEGATIVE
HOLD SPECIMEN: NORMAL
HYALINE CASTS #/AREA URNS LPF: ABNORMAL /LPF
IMM GRANULOCYTES # BLD AUTO: 0.01 X10(3)/MCL (ref 0–0.04)
IMM GRANULOCYTES NFR BLD AUTO: 0.2 %
KETONES UR QL STRIP: ABNORMAL
LEUKOCYTE ESTERASE UR QL STRIP: NEGATIVE
LIPASE SERPL-CCNC: 815 U/L
LYMPHOCYTES # BLD AUTO: 0.62 X10(3)/MCL (ref 0.6–4.6)
LYMPHOCYTES NFR BLD AUTO: 11.8 %
MAGNESIUM SERPL-MCNC: 1.8 MG/DL (ref 1.6–2.6)
MCH RBC QN AUTO: 32.2 PG (ref 27–31)
MCHC RBC AUTO-ENTMCNC: 34.8 G/DL (ref 33–36)
MCV RBC AUTO: 92.5 FL (ref 80–94)
MONOCYTES # BLD AUTO: 0.54 X10(3)/MCL (ref 0.1–1.3)
MONOCYTES NFR BLD AUTO: 10.2 %
MUCOUS THREADS URNS QL MICRO: ABNORMAL /LPF
NEUTROPHILS # BLD AUTO: 3.99 X10(3)/MCL (ref 2.1–9.2)
NEUTROPHILS NFR BLD AUTO: 75.7 %
NITRITE UR QL STRIP: NEGATIVE
NRBC BLD AUTO-RTO: 0 %
PH UR STRIP: 6.5 [PH]
PLATELET # BLD AUTO: 136 X10(3)/MCL (ref 130–400)
PLATELETS.RETICULATED NFR BLD AUTO: 9.2 % (ref 0.9–11.2)
PMV BLD AUTO: 11.3 FL (ref 7.4–10.4)
POTASSIUM SERPL-SCNC: 4.1 MMOL/L (ref 3.5–5.1)
PROT SERPL-MCNC: 8.4 GM/DL (ref 6.4–8.3)
PROT UR QL STRIP: ABNORMAL
RBC # BLD AUTO: 4.94 X10(6)/MCL (ref 4.7–6.1)
RBC #/AREA URNS AUTO: ABNORMAL /HPF
SODIUM SERPL-SCNC: 131 MMOL/L (ref 136–145)
SP GR UR STRIP.AUTO: 1.05 (ref 1–1.03)
SQUAMOUS #/AREA URNS LPF: ABNORMAL /HPF
UROBILINOGEN UR STRIP-ACNC: ABNORMAL
WBC # BLD AUTO: 5.27 X10(3)/MCL (ref 4.5–11.5)
WBC #/AREA URNS AUTO: ABNORMAL /HPF

## 2024-12-29 PROCEDURE — 96361 HYDRATE IV INFUSION ADD-ON: CPT

## 2024-12-29 PROCEDURE — 80053 COMPREHEN METABOLIC PANEL: CPT | Performed by: EMERGENCY MEDICINE

## 2024-12-29 PROCEDURE — 85025 COMPLETE CBC W/AUTO DIFF WBC: CPT | Performed by: EMERGENCY MEDICINE

## 2024-12-29 PROCEDURE — 83880 ASSAY OF NATRIURETIC PEPTIDE: CPT | Performed by: EMERGENCY MEDICINE

## 2024-12-29 PROCEDURE — 63600175 PHARM REV CODE 636 W HCPCS: Performed by: EMERGENCY MEDICINE

## 2024-12-29 PROCEDURE — 99285 EMERGENCY DEPT VISIT HI MDM: CPT | Mod: 25

## 2024-12-29 PROCEDURE — 81001 URINALYSIS AUTO W/SCOPE: CPT | Performed by: EMERGENCY MEDICINE

## 2024-12-29 PROCEDURE — 96375 TX/PRO/DX INJ NEW DRUG ADDON: CPT

## 2024-12-29 PROCEDURE — 96374 THER/PROPH/DIAG INJ IV PUSH: CPT

## 2024-12-29 PROCEDURE — 83735 ASSAY OF MAGNESIUM: CPT | Performed by: EMERGENCY MEDICINE

## 2024-12-29 PROCEDURE — 82077 ASSAY SPEC XCP UR&BREATH IA: CPT | Performed by: EMERGENCY MEDICINE

## 2024-12-29 PROCEDURE — 25500020 PHARM REV CODE 255

## 2024-12-29 PROCEDURE — 25000003 PHARM REV CODE 250: Performed by: EMERGENCY MEDICINE

## 2024-12-29 PROCEDURE — 83690 ASSAY OF LIPASE: CPT | Performed by: EMERGENCY MEDICINE

## 2024-12-29 RX ORDER — KETOROLAC TROMETHAMINE 30 MG/ML
15 INJECTION, SOLUTION INTRAMUSCULAR; INTRAVENOUS
Status: COMPLETED | OUTPATIENT
Start: 2024-12-29 | End: 2024-12-29

## 2024-12-29 RX ORDER — SUCRALFATE 1 G/10ML
1 SUSPENSION ORAL 4 TIMES DAILY
Qty: 280 ML | Refills: 0 | Status: SHIPPED | OUTPATIENT
Start: 2024-12-29 | End: 2025-01-05

## 2024-12-29 RX ORDER — ONDANSETRON 4 MG/1
4 TABLET, ORALLY DISINTEGRATING ORAL EVERY 6 HOURS PRN
Qty: 15 TABLET | Refills: 0 | Status: SHIPPED | OUTPATIENT
Start: 2024-12-29

## 2024-12-29 RX ORDER — LIDOCAINE HYDROCHLORIDE 20 MG/ML
15 SOLUTION OROPHARYNGEAL
Status: COMPLETED | OUTPATIENT
Start: 2024-12-29 | End: 2024-12-29

## 2024-12-29 RX ORDER — PROCHLORPERAZINE EDISYLATE 5 MG/ML
10 INJECTION INTRAMUSCULAR; INTRAVENOUS
Status: COMPLETED | OUTPATIENT
Start: 2024-12-29 | End: 2024-12-29

## 2024-12-29 RX ADMIN — PROCHLORPERAZINE EDISYLATE 10 MG: 5 INJECTION INTRAMUSCULAR; INTRAVENOUS at 06:12

## 2024-12-29 RX ADMIN — ALUMINUM HYDROXIDE AND MAGNESIUM HYDROXIDE 30 ML: 200; 200 SUSPENSION ORAL at 06:12

## 2024-12-29 RX ADMIN — LIDOCAINE HYDROCHLORIDE 15 ML: 20 SOLUTION ORAL at 06:12

## 2024-12-29 RX ADMIN — IOHEXOL 100 ML: 350 INJECTION, SOLUTION INTRAVENOUS at 07:12

## 2024-12-29 RX ADMIN — KETOROLAC TROMETHAMINE 15 MG: 30 INJECTION, SOLUTION INTRAMUSCULAR; INTRAVENOUS at 06:12

## 2024-12-29 RX ADMIN — SODIUM CHLORIDE 1000 ML: 9 INJECTION, SOLUTION INTRAVENOUS at 06:12

## 2024-12-29 NOTE — ED PROVIDER NOTES
"ED PROVIDER NOTE  12/29/2024    CHIEF COMPLAINT:   Chief Complaint   Patient presents with    Abdominal Pain     Epigastric pain x2 days. States drank beer x2 days ago. Hx of pancreatitis.        HISTORY OF PRESENT ILLNESS:   Osei Wellington is a 29 y.o. male who presents with chief complaint Abdominal pain.  Onset was 2 days ago whenever he began having epigastric abdominal pain radiating around to the left side associated with nausea and vomiting.  States it feels like when he has had pancreatitis in the past.  He reports that he has a daily drinker and usually he can "feel it coming on" and stopped drinking before his pancreas gets inflamed but apparently he did not stop in time.  Denies hematemesis.    The history is provided by the patient.         REVIEW OF SYSTEMS: as noted in the HPI.  NURSING NOTES REVIEWED      PAST MEDICAL/SURGICAL HISTORY:   Past Medical History:   Diagnosis Date    Pancreatitis 2020    No past surgical history on file.    FAMILY HISTORY: No family history on file.    SOCIAL HISTORY:   Social History     Tobacco Use    Smoking status: Never    Smokeless tobacco: Never   Substance Use Topics    Alcohol use: Yes     Comment: Binge drinker    Drug use: Yes     Types: Marijuana     Comment: Vape       ALLERGIES: Review of patient's allergies indicates:  No Known Allergies    PHYSICAL EXAM:  Initial Vitals   BP Pulse Resp Temp SpO2   12/29/24 1727 12/29/24 1727 12/29/24 1727 12/29/24 1727 12/29/24 1803   (!) 92/57 74 18 98 °F (36.7 °C) 99 %      MAP       --                Physical Exam    Nursing note and vitals reviewed.  Constitutional: He appears well-developed and well-nourished. No distress.   HENT:   Head: Normocephalic and atraumatic.   Nose: Nose normal. Mouth/Throat: Oropharynx is clear and moist and mucous membranes are normal.   Eyes: Conjunctivae and EOM are normal. Pupils are equal, round, and reactive to light.   Neck: Neck supple. No tracheal deviation present. "   Cardiovascular:  Normal rate, regular rhythm, normal heart sounds, intact distal pulses and normal pulses.           Pulmonary/Chest: Effort normal and breath sounds normal. No respiratory distress.   Abdominal: Abdomen is soft. There is abdominal tenderness in the epigastric area and left upper quadrant. There is no rebound and no guarding.   Musculoskeletal:         General: Normal range of motion.      Cervical back: Neck supple.     Neurological: He is alert and oriented to person, place, and time. GCS eye subscore is 4. GCS verbal subscore is 5. GCS motor subscore is 6.   CN II-XII intact. Moves all extremities. No gross sensory or motor deficits.   Skin: Skin is warm, dry and intact.   Psychiatric: He has a normal mood and affect. His speech is normal and behavior is normal. Judgment and thought content normal. Cognition and memory are normal.         RESULTS:  Labs Reviewed   COMPREHENSIVE METABOLIC PANEL - Abnormal       Result Value    Sodium 131 (*)     Potassium 4.1      Chloride 98      CO2 21 (*)     Glucose 93      Blood Urea Nitrogen 11.0      Creatinine 0.81      Calcium 10.3 (*)     Protein Total 8.4 (*)     Albumin 4.6      Globulin 3.8 (*)     Albumin/Globulin Ratio 1.2      Bilirubin Total 0.9             (*)      (*)     eGFR >60      Anion Gap 12.0      BUN/Creatinine Ratio 14     LIPASE - Abnormal    Lipase Level 815 (*)    URINALYSIS, REFLEX TO URINE CULTURE - Abnormal    Color, UA Yellow      Appearance, UA Clear      Specific Gravity, UA 1.048 (*)     pH, UA 6.5      Protein, UA 4+ (*)     Glucose, UA Normal      Ketones, UA 2+ (*)     Blood, UA Negative      Bilirubin, UA 1+ (*)     Urobilinogen, UA 3+ (*)     Nitrites, UA Negative      Leukocyte Esterase, UA Negative      RBC, UA 0-5      WBC, UA 0-5      Bacteria, UA None Seen      Squamous Epithelial Cells, UA None Seen      Mucous, UA Many (*)     Hyaline Casts, UA None Seen     CBC WITH DIFFERENTIAL - Abnormal     WBC 5.27      RBC 4.94      Hgb 15.9      Hct 45.7      MCV 92.5      MCH 32.2 (*)     MCHC 34.8      RDW 12.5      Platelet 136      MPV 11.3 (*)     IPF 9.2      Neut % 75.7      Lymph % 11.8      Mono % 10.2      Eos % 1.3      Basophil % 0.8      Lymph # 0.62      Neut # 3.99      Mono # 0.54      Eos # 0.07      Baso # 0.04      IG# 0.01      IG% 0.2      NRBC% 0.0     B-TYPE NATRIURETIC PEPTIDE - Normal    Natriuretic Peptide 12.8     MAGNESIUM - Normal    Magnesium Level 1.80     ALCOHOL,MEDICAL (ETHANOL) - Normal    Ethanol Level <10.0     CBC W/ AUTO DIFFERENTIAL    Narrative:     The following orders were created for panel order CBC auto differential.  Procedure                               Abnormality         Status                     ---------                               -----------         ------                     CBC with Differential[[id number]]       Abnormal            Final result                 Please view results for these tests on the individual orders.   EXTRA TUBES    Narrative:     The following orders were created for panel order EXTRA TUBES.  Procedure                               Abnormality         Status                     ---------                               -----------         ------                     Light Blue Top Hold[[id number]]                             Final result               Light Green Top Hold[[id number]]                            Final result               Gold Top Hold[[id number]]                                   Final result                 Please view results for these tests on the individual orders.   LIGHT BLUE TOP HOLD    Extra Tube Hold for add-ons.     LIGHT GREEN TOP HOLD    Extra Tube Hold for add-ons.     GOLD TOP HOLD    Extra Tube Hold for add-ons.       Imaging Results              CT Abdomen Pelvis With IV Contrast NO Oral Contrast (Final result)  Result time 12/29/24 20:14:34      Final result by Ramon Stallworth MD (12/29/24  20:14:34)                   Impression:      1. Sequelae of acute pancreatitis with pancreatic and peripancreatic inflammatory changes improved compared to 11/04/2024.  2. Filling defects within mesenteric venous branches.  Differential includes artifact related to contrast bolus timing versus true intraluminal thrombus.  Although the CT findings are nonspecific, slightly favor artifact.      Electronically signed by: Ramon Stallworth MD  Date:    12/29/2024  Time:    20:14               Narrative:    EXAMINATION:  CT ABDOMEN PELVIS WITH IV CONTRAST    CLINICAL HISTORY:  Epigastric pain, pancreatitis    TECHNIQUE:  Axial CT images were obtained through the abdomen and pelvis following IV administration of contrast.  100 mL Omnipaque 350.  Coronal and sagittal reconstructions submitted and interpreted.  Automated exposure control, dose radiation lowering technique, was utilized.    COMPARISON:  CT abdomen and pelvis from 11/04/2024    CT abdomen and pelvis from 12/03/2023    FINDINGS:  Heart size is normal.  Lung bases are clear.  Gallbladder is mildly distended.  Low-density lesion at the left hepatic lobe measures 2.4 x 1.7 cm, unchanged compared to 11/04/2024.  Otherwise, liver appears normal.  Patent portal and splenic veins.  There are filling defects within mesenteric venous branches supplying the small bowel (image 79, series 2).    Spleen, adrenal glands and kidneys appear normal.  No hydronephrosis.  Peripancreatic fat stranding is present, significantly decreased compared to 11/04/2024.  Pancreas still demonstrates heterogeneous parenchymal enhancement is markedly improved since 11/04/2024.  No rim enhancing fluid collection to suggest abscess or pseudocyst.    The bowel is nonobstructed.  Air and stool are present throughout the colon which appears within normal limits.  Normal appendix.  Trace amount of free fluid is present within the pelvis.  No free air.    No suspicious osteolytic or osteoblastic  lesion.                                      PROCEDURES:  Procedures    ECG:       ED COURSE AND MEDICAL DECISION MAKING:  Medications   ketorolac injection 15 mg (15 mg Intravenous Given 12/29/24 1809)   sodium chloride 0.9% bolus 1,000 mL 1,000 mL (1,000 mLs Intravenous New Bag 12/29/24 1810)   prochlorperazine injection Soln 10 mg (10 mg Intravenous Given 12/29/24 1816)   aluminum-magnesium hydroxide 200-200 mg/5 mL suspension 30 mL (30 mLs Oral Given 12/29/24 1803)   LIDOcaine viscous HCl 2% oral solution 15 mL (15 mLs Oral Given 12/29/24 1803)   iohexoL (OMNIPAQUE 350) 350 mg iodine/mL injection (100 mLs  Given 12/29/24 1949)     ED Course as of 12/29/24 2030   Sun Dec 29, 2024   1836 Lipase(!): 815 [IB]   1836 Creatinine: 0.81 [IB]   1836 Magnesium : 1.80 [IB]   1836 WBC: 5.27 [IB]   1836 Hemoglobin: 15.9 [IB]   1836 Platelet Count: 136 [IB]   1836 Magnesium : 1.80 [IB]   1836 Alcohol, Serum: <10.0 [IB]   1846 REPORTS IMPROVEMENT IN HIS PAIN AFTER IV TORADOL AND GI COCKTAIL OF MAALOX AND LIDOCAINE. [IB]   1851 NITRITE UA: Negative [IB]   1851 Leukocyte Esterase, UA: Negative [IB]   1851 RBC, UA: 0-5 [IB]   1851 WBC, UA: 0-5 [IB]   1851 Bacteria, UA: None Seen [IB]      ED Course User Index  [IB] Valentino Augustine, DO        Medical Decision Making  29-year-old male who presents with 2 days of epigastric abdominal pain radiating to the left upper quadrant associated with nausea and vomiting.  Differential diagnosis includes acute pancreatitis, chronic pancreatitis, alcoholic gastritis.    Amount and/or Complexity of Data Reviewed  Labs: ordered. Decision-making details documented in ED Course.  Radiology: ordered.    Risk  OTC drugs.  Prescription drug management.    8:28 PM    At this time pt stable, states pain free, tolerating PO intake. Oriented regarding danger of alcohol related pancreatitis, states will start going to AA meetings.     CLINICAL IMPRESSION:  1. Alcohol-induced chronic pancreatitis         DISPOSITION:   ED Disposition Condition    Discharge Fair            ED Prescriptions       Medication Sig Dispense Start Date End Date Auth. Provider    sucralfate (CARAFATE) 100 mg/mL suspension Take 10 mLs (1 g total) by mouth 4 (four) times daily. for 7 days 280 mL 12/29/2024 1/5/2025 Kareem Morejon MD    ondansetron (ZOFRAN-ODT) 4 MG TbDL Take 1 tablet (4 mg total) by mouth every 6 (six) hours as needed. 15 tablet 12/29/2024 -- Kareem Morejon MD          Follow-up Information       Follow up With Specialties Details Why Contact Info    [facility] Emergency Medicine  If symptoms worsen [address] [facility] [id number] [phone]    Monet Forrester MD Internal Medicine Schedule an appointment as soon as possible for a visit in 1 month  [address] [facility] [address]  [phone]                 Kareem Morejon MD  12/29/24 2030

## 2024-12-30 ENCOUNTER — HOSPITAL ENCOUNTER (EMERGENCY)
Facility: HOSPITAL | Age: 29
Discharge: PSYCHIATRIC OR SUBSTANCE USE FACILITY | End: 2024-12-30
Attending: EMERGENCY MEDICINE
Payer: MEDICAID

## 2024-12-30 VITALS
HEIGHT: 74 IN | WEIGHT: 176.38 LBS | DIASTOLIC BLOOD PRESSURE: 99 MMHG | TEMPERATURE: 98 F | RESPIRATION RATE: 16 BRPM | BODY MASS INDEX: 22.63 KG/M2 | HEART RATE: 73 BPM | SYSTOLIC BLOOD PRESSURE: 152 MMHG | OXYGEN SATURATION: 97 %

## 2024-12-30 DIAGNOSIS — F10.932 ALCOHOL WITHDRAWAL SYNDROME WITH PERCEPTUAL DISTURBANCE: Primary | ICD-10-CM

## 2024-12-30 DIAGNOSIS — F10.951: ICD-10-CM

## 2024-12-30 DIAGNOSIS — Z71.51 ENCOUNTER FOR DRUG REHABILITATION: ICD-10-CM

## 2024-12-30 LAB
ALBUMIN SERPL-MCNC: 4.5 G/DL (ref 3.5–5)
ALBUMIN/GLOB SERPL: 1.2 RATIO (ref 1.1–2)
ALP SERPL-CCNC: 104 UNIT/L (ref 40–150)
ALT SERPL-CCNC: 125 UNIT/L (ref 0–55)
AMPHET UR QL SCN: NEGATIVE
ANION GAP SERPL CALC-SCNC: 13 MEQ/L
APAP SERPL-MCNC: <3 UG/ML (ref 10–30)
AST SERPL-CCNC: 130 UNIT/L (ref 5–34)
BACTERIA #/AREA URNS AUTO: ABNORMAL /HPF
BARBITURATE SCN PRESENT UR: NEGATIVE
BASOPHILS # BLD AUTO: 0.02 X10(3)/MCL
BASOPHILS NFR BLD AUTO: 0.4 %
BENZODIAZ UR QL SCN: NEGATIVE
BILIRUB SERPL-MCNC: 0.9 MG/DL
BILIRUB UR QL STRIP.AUTO: NEGATIVE
BUN SERPL-MCNC: 10.7 MG/DL (ref 8.9–20.6)
CALCIUM SERPL-MCNC: 10.2 MG/DL (ref 8.4–10.2)
CANNABINOIDS UR QL SCN: POSITIVE
CHLORIDE SERPL-SCNC: 98 MMOL/L (ref 98–107)
CLARITY UR: CLEAR
CO2 SERPL-SCNC: 23 MMOL/L (ref 22–29)
COCAINE UR QL SCN: NEGATIVE
COLOR UR AUTO: ABNORMAL
CREAT SERPL-MCNC: 0.75 MG/DL (ref 0.72–1.25)
CREAT/UREA NIT SERPL: 14
EOSINOPHIL # BLD AUTO: 0.01 X10(3)/MCL (ref 0–0.9)
EOSINOPHIL NFR BLD AUTO: 0.2 %
ERYTHROCYTE [DISTWIDTH] IN BLOOD BY AUTOMATED COUNT: 12.5 % (ref 11.5–17)
ETHANOL SERPL-MCNC: <10 MG/DL
FENTANYL UR QL SCN: NEGATIVE
GFR SERPLBLD CREATININE-BSD FMLA CKD-EPI: >60 ML/MIN/1.73/M2
GLOBULIN SER-MCNC: 3.7 GM/DL (ref 2.4–3.5)
GLUCOSE SERPL-MCNC: 116 MG/DL (ref 74–100)
GLUCOSE UR QL STRIP: NORMAL
HCT VFR BLD AUTO: 43.4 % (ref 42–52)
HGB BLD-MCNC: 15.4 G/DL (ref 14–18)
HGB UR QL STRIP: NEGATIVE
HOLD SPECIMEN: NORMAL
HYALINE CASTS #/AREA URNS LPF: ABNORMAL /LPF
IMM GRANULOCYTES # BLD AUTO: 0.01 X10(3)/MCL (ref 0–0.04)
IMM GRANULOCYTES NFR BLD AUTO: 0.2 %
KETONES UR QL STRIP: ABNORMAL
LEUKOCYTE ESTERASE UR QL STRIP: NEGATIVE
LYMPHOCYTES # BLD AUTO: 0.4 X10(3)/MCL (ref 0.6–4.6)
LYMPHOCYTES NFR BLD AUTO: 7.6 %
MCH RBC QN AUTO: 32.4 PG (ref 27–31)
MCHC RBC AUTO-ENTMCNC: 35.5 G/DL (ref 33–36)
MCV RBC AUTO: 91.4 FL (ref 80–94)
MDMA UR QL SCN: NEGATIVE
MONOCYTES # BLD AUTO: 0.47 X10(3)/MCL (ref 0.1–1.3)
MONOCYTES NFR BLD AUTO: 8.9 %
MUCOUS THREADS URNS QL MICRO: ABNORMAL /LPF
NEUTROPHILS # BLD AUTO: 4.35 X10(3)/MCL (ref 2.1–9.2)
NEUTROPHILS NFR BLD AUTO: 82.7 %
NITRITE UR QL STRIP: NEGATIVE
NRBC BLD AUTO-RTO: 0 %
OPIATES UR QL SCN: NEGATIVE
PCP UR QL: NEGATIVE
PH UR STRIP: 7 [PH]
PH UR: 7 [PH] (ref 3–11)
PLATELET # BLD AUTO: 135 X10(3)/MCL (ref 130–400)
PMV BLD AUTO: 12.2 FL (ref 7.4–10.4)
POTASSIUM SERPL-SCNC: 3.8 MMOL/L (ref 3.5–5.1)
PROT SERPL-MCNC: 8.2 GM/DL (ref 6.4–8.3)
PROT UR QL STRIP: ABNORMAL
RBC # BLD AUTO: 4.75 X10(6)/MCL (ref 4.7–6.1)
RBC #/AREA URNS AUTO: ABNORMAL /HPF
SODIUM SERPL-SCNC: 134 MMOL/L (ref 136–145)
SP GR UR STRIP.AUTO: 1.04 (ref 1–1.03)
SPECIFIC GRAVITY, URINE AUTO (.000) (OHS): 1.04 (ref 1–1.03)
SQUAMOUS #/AREA URNS LPF: ABNORMAL /HPF
TSH SERPL-ACNC: 1.6 UIU/ML (ref 0.35–4.94)
UROBILINOGEN UR STRIP-ACNC: NORMAL
WBC # BLD AUTO: 5.26 X10(3)/MCL (ref 4.5–11.5)
WBC #/AREA URNS AUTO: ABNORMAL /HPF

## 2024-12-30 PROCEDURE — 99285 EMERGENCY DEPT VISIT HI MDM: CPT | Mod: 25

## 2024-12-30 PROCEDURE — 85025 COMPLETE CBC W/AUTO DIFF WBC: CPT | Performed by: EMERGENCY MEDICINE

## 2024-12-30 PROCEDURE — 96376 TX/PRO/DX INJ SAME DRUG ADON: CPT

## 2024-12-30 PROCEDURE — 80053 COMPREHEN METABOLIC PANEL: CPT | Performed by: EMERGENCY MEDICINE

## 2024-12-30 PROCEDURE — 80307 DRUG TEST PRSMV CHEM ANLYZR: CPT | Performed by: EMERGENCY MEDICINE

## 2024-12-30 PROCEDURE — 81001 URINALYSIS AUTO W/SCOPE: CPT | Mod: XB | Performed by: EMERGENCY MEDICINE

## 2024-12-30 PROCEDURE — 96374 THER/PROPH/DIAG INJ IV PUSH: CPT

## 2024-12-30 PROCEDURE — 25000003 PHARM REV CODE 250: Performed by: EMERGENCY MEDICINE

## 2024-12-30 PROCEDURE — 82077 ASSAY SPEC XCP UR&BREATH IA: CPT | Performed by: EMERGENCY MEDICINE

## 2024-12-30 PROCEDURE — 96375 TX/PRO/DX INJ NEW DRUG ADDON: CPT

## 2024-12-30 PROCEDURE — 80143 DRUG ASSAY ACETAMINOPHEN: CPT | Performed by: EMERGENCY MEDICINE

## 2024-12-30 PROCEDURE — 84443 ASSAY THYROID STIM HORMONE: CPT | Performed by: EMERGENCY MEDICINE

## 2024-12-30 PROCEDURE — 63600175 PHARM REV CODE 636 W HCPCS: Performed by: EMERGENCY MEDICINE

## 2024-12-30 RX ORDER — LORAZEPAM 2 MG/ML
2 INJECTION INTRAMUSCULAR
Status: COMPLETED | OUTPATIENT
Start: 2024-12-30 | End: 2024-12-30

## 2024-12-30 RX ORDER — THIAMINE HYDROCHLORIDE 100 MG/ML
100 INJECTION, SOLUTION INTRAMUSCULAR; INTRAVENOUS
Status: COMPLETED | OUTPATIENT
Start: 2024-12-30 | End: 2024-12-30

## 2024-12-30 RX ORDER — CHLORDIAZEPOXIDE HYDROCHLORIDE 25 MG/1
25 CAPSULE, GELATIN COATED ORAL
Status: COMPLETED | OUTPATIENT
Start: 2024-12-30 | End: 2024-12-30

## 2024-12-30 RX ORDER — THIAMINE HYDROCHLORIDE 100 MG/ML
100 INJECTION, SOLUTION INTRAMUSCULAR; INTRAVENOUS
Status: DISCONTINUED | OUTPATIENT
Start: 2024-12-30 | End: 2024-12-30

## 2024-12-30 RX ORDER — CHLORDIAZEPOXIDE HYDROCHLORIDE 25 MG/1
25 CAPSULE, GELATIN COATED ORAL 4 TIMES DAILY PRN
Status: DISCONTINUED | OUTPATIENT
Start: 2024-12-30 | End: 2024-12-30 | Stop reason: HOSPADM

## 2024-12-30 RX ADMIN — THIAMINE HYDROCHLORIDE 100 MG: 100 INJECTION, SOLUTION INTRAMUSCULAR; INTRAVENOUS at 09:12

## 2024-12-30 RX ADMIN — THIAMINE HYDROCHLORIDE 100 MG: 100 INJECTION, SOLUTION INTRAMUSCULAR; INTRAVENOUS at 04:12

## 2024-12-30 RX ADMIN — CHLORDIAZEPOXIDE HYDROCHLORIDE 25 MG: 25 CAPSULE ORAL at 09:12

## 2024-12-30 RX ADMIN — LORAZEPAM 2 MG: 2 INJECTION INTRAMUSCULAR; INTRAVENOUS at 04:12

## 2024-12-30 RX ADMIN — LORAZEPAM 2 MG: 2 INJECTION INTRAMUSCULAR; INTRAVENOUS at 09:12

## 2024-12-30 RX ADMIN — LORAZEPAM 2 MG: 2 INJECTION INTRAMUSCULAR; INTRAVENOUS at 12:12

## 2024-12-30 RX ADMIN — LORAZEPAM 2 MG: 2 INJECTION INTRAMUSCULAR; INTRAVENOUS at 11:12

## 2024-12-30 RX ADMIN — LORAZEPAM 2 MG: 2 INJECTION INTRAMUSCULAR; INTRAVENOUS at 01:12

## 2024-12-30 NOTE — ED PROVIDER NOTES
ED PROVIDER NOTE  12/30/2024    CHIEF COMPLAINT:   Chief Complaint   Patient presents with    Delirium Tremens (DTS)     Pt reports alcohol withdrawal with visual hallucinations x 3 days.        HISTORY OF PRESENT ILLNESS:   Osei Wellington is a 29 y.o. male who presents with chief complaint Alcohol withdrawal.  Patient reports he has not drank any alcohol since 3 days ago and is having alcohol withdrawal symptoms with visual hallucinations.  States he wants to go into rehab.  Reports his abdominal pain has improved and he was actually able to eat today.  Denies vomiting or diarrhea, chest pain or palpitations or shortness of breath.    The history is provided by the patient.         REVIEW OF SYSTEMS: as noted in the HPI.  NURSING NOTES REVIEWED      PAST MEDICAL/SURGICAL HISTORY:   Past Medical History:   Diagnosis Date    Pancreatitis 2020    History reviewed. No pertinent surgical history.    FAMILY HISTORY: No family history on file.    SOCIAL HISTORY:   Social History     Tobacco Use    Smoking status: Never    Smokeless tobacco: Never   Substance Use Topics    Alcohol use: Yes     Comment: Binge drinker    Drug use: Yes     Types: Marijuana     Comment: Vape       ALLERGIES: Review of patient's allergies indicates:  No Known Allergies    PHYSICAL EXAM:  Initial Vitals [12/30/24 0849]   BP Pulse Resp Temp SpO2   (!) 154/109 86 18 98.4 °F (36.9 °C) 99 %      MAP       --         Physical Exam    Nursing note and vitals reviewed.  Constitutional: He appears well-developed and well-nourished. No distress.   HENT:   Head: Normocephalic and atraumatic.   Nose: Nose normal. Mouth/Throat: Oropharynx is clear and moist and mucous membranes are normal.   Eyes: Conjunctivae and EOM are normal. Pupils are equal, round, and reactive to light.   Neck: Neck supple. No tracheal deviation present.   Cardiovascular:  Normal rate, regular rhythm, normal heart sounds, intact distal pulses and normal pulses.            Pulmonary/Chest: Effort normal and breath sounds normal. No respiratory distress.   Abdominal: Abdomen is soft. There is no rebound and no guarding.   Musculoskeletal:         General: Normal range of motion.      Cervical back: Neck supple.     Neurological: He is alert and oriented to person, place, and time. GCS eye subscore is 4. GCS verbal subscore is 5. GCS motor subscore is 6.   CN II-XII intact. Moves all extremities. No gross sensory or motor deficits.   Skin: Skin is warm, dry and intact.   Psychiatric: His speech is normal. His mood appears anxious. He is agitated and actively hallucinating. Cognition and memory are impaired. He expresses inappropriate judgment.         RESULTS:  Labs Reviewed   DRUG SCREEN, URINE (BEAKER) - Abnormal       Result Value    Amphetamines, Urine Negative      Barbiturates, Urine Negative      Benzodiazepine, Urine Negative      Cannabinoids, Urine Positive (*)     Cocaine, Urine Negative      Fentanyl, Urine Negative      MDMA, Urine Negative      Opiates, Urine Negative      Phencyclidine, Urine Negative      pH, Urine 7.0      Specific Gravity, Urine Auto 1.042 (*)     Narrative:     Cut off concentrations:    Amphetamines - 1000 ng/ml  Barbiturates - 200 ng/ml  Benzodiazepine - 200 ng/ml  Cannabinoids (THC) - 50 ng/ml  Cocaine - 300 ng/ml  Fentanyl - 1.0 ng/ml  MDMA - 500 ng/ml  Opiates - 300 ng/ml   Phencyclidine (PCP) - 25 ng/ml    Specimen submitted for drug analysis and tested for pH and specific gravity in order to evaluate sample integrity. Suspect tampering if specific gravity is <1.003 and/or pH is not within the range of 4.5 - 8.0  False negatives may result form substances such as bleach added to urine.  False positives may result for the presence of a substance with similar chemical structure to the drug or its metabolite.    This test provides only a PRELIMINARY analytical test result. A more specific alternate chemical method must be used in order to obtain  a confirmed analytical result. Gas chromatography/mass spectrometry (GC/MS) is the preferred confirmatory method. Other chemical confirmation methods are available. Clinical consideration and professional judgement should be applied to any drug of abuse test result, particularly when preliminary positive results are used.    Positive results will be confirmed only at the physicians request. Unconfirmed screening results are to be used only for medical purposes (treatment).        COMPREHENSIVE METABOLIC PANEL - Abnormal    Sodium 134 (*)     Potassium 3.8      Chloride 98      CO2 23      Glucose 116 (*)     Blood Urea Nitrogen 10.7      Creatinine 0.75      Calcium 10.2      Protein Total 8.2      Albumin 4.5      Globulin 3.7 (*)     Albumin/Globulin Ratio 1.2      Bilirubin Total 0.9             (*)      (*)     eGFR >60      Anion Gap 13.0      BUN/Creatinine Ratio 14     ACETAMINOPHEN LEVEL - Abnormal    Acetaminophen Level <3.0 (*)    CBC WITH DIFFERENTIAL - Abnormal    WBC 5.26      RBC 4.75      Hgb 15.4      Hct 43.4      MCV 91.4      MCH 32.4 (*)     MCHC 35.5      RDW 12.5      Platelet 135      MPV 12.2 (*)     Neut % 82.7      Lymph % 7.6      Mono % 8.9      Eos % 0.2      Basophil % 0.4      Lymph # 0.40 (*)     Neut # 4.35      Mono # 0.47      Eos # 0.01      Baso # 0.02      IG# 0.01      IG% 0.2      NRBC% 0.0     ALCOHOL,MEDICAL (ETHANOL) - Normal    Ethanol Level <10.0     EXTRA TUBES    Narrative:     The following orders were created for panel order EXTRA TUBES.  Procedure                               Abnormality         Status                     ---------                               -----------         ------                     Light Blue Top Hold[[id number]]                             Final result               Red Top Hold[[id number]]                                    Final result               Lavender Top Hold[[id number]]                               Final  result               Gold Top Hold[[id number]]                                   Final result                 Please view results for these tests on the individual orders.   LIGHT BLUE TOP HOLD    Extra Tube Hold for add-ons.     RED TOP HOLD    Extra Tube Hold for add-ons.     LAVENDER TOP HOLD    Extra Tube Hold for add-ons.     GOLD TOP HOLD    Extra Tube Hold for add-ons.     CBC W/ AUTO DIFFERENTIAL    Narrative:     The following orders were created for panel order CBC auto differential.  Procedure                               Abnormality         Status                     ---------                               -----------         ------                     CBC with Differential[[id number]]       Abnormal            Final result                 Please view results for these tests on the individual orders.   TSH   URINALYSIS, REFLEX TO URINE CULTURE     Imaging Results    None         PROCEDURES:  Critical Care    Date/Time: 12/30/2024 11:26 AM    Performed by: Valentino Augustine DO  Authorized by: Valentino Augustine DO  Direct patient critical care time: 20 minutes  Ordering / reviewing critical care time: 10 minutes  Documentation critical care time: 5 minutes  Total critical care time (exclusive of procedural time) : 35 minutes  Critical care was necessary to treat or prevent imminent or life-threatening deterioration of the following conditions: CNS failure or compromise and toxidrome.  Critical care was time spent personally by me on the following activities: development of treatment plan with patient or surrogate, interpretation of cardiac output measurements, evaluation of patient's response to treatment, examination of patient, obtaining history from patient or surrogate, ordering and performing treatments and interventions, ordering and review of laboratory studies, re-evaluation of patient's condition and review of old charts.          ECG:       ED COURSE AND MEDICAL DECISION MAKING:  Medications    chlordiazepoxide capsule 25 mg (has no administration in time range)   chlordiazepoxide capsule 25 mg (25 mg Oral Given 12/30/24 0913)   LORazepam injection 2 mg (2 mg Intravenous Given 12/30/24 0918)   thiamine injection 100 mg (100 mg Intravenous Given 12/30/24 0915)   LORazepam injection 2 mg (2 mg Intravenous Given 12/30/24 1113)     ED Course as of 12/30/24 1127   Mon Dec 30, 2024   0929 Alcohol, Serum: <10.0 [IB]   0959 Cannabinoids, Urine(!): Positive [IB]   1125 WBC: 5.26 [IB]   1125 Hemoglobin: 15.4 [IB]   1125 Platelet Count: 135 [IB]   1125 Creatinine: 0.75 [IB]      ED Course User Index  [IB] Valentino Augustine,         Medical Decision Making  29-year-old male who presents with severe alcohol withdrawal having visual hallucinations.  Differential diagnosis includes alcoholic hallucinosis, psychedelics drug use, delirium tremens.  Patient gravely disabled and for his safety he is placed under involuntary commitment so that we can get him treated for his alcohol withdrawal.  Requiring repeat IV benzodiazepines to try to manage his symptoms.  [facility] liaison consulted and has gotten a bed for him in [facility].  He is medically cleared for admission.    Amount and/or Complexity of Data Reviewed  External Data Reviewed: labs and notes.  Labs: ordered. Decision-making details documented in ED Course.  Discussion of management or test interpretation with external provider(s): [facility]    Risk  Prescription drug management.  Parenteral controlled substances.  Decision regarding hospitalization.  Diagnosis or treatment significantly limited by social determinants of health.        CLINICAL IMPRESSION:  1. Alcohol withdrawal syndrome with perceptual disturbance    2. Encounter for drug rehabilitation    3. Hallucinosis, alcoholic        DISPOSITION:   ED Disposition Condition    Transfer to Psych Facility Stable            ED Prescriptions    None       Follow-up Information     None            Valentino Augustine, DO  12/30/24 1129

## 2025-07-06 ENCOUNTER — HOSPITAL ENCOUNTER (OUTPATIENT)
Facility: HOSPITAL | Age: 30
Discharge: HOME | End: 2025-07-08
Attending: EMERGENCY MEDICINE | Admitting: INTERNAL MEDICINE
Payer: MEDICAID

## 2025-07-06 DIAGNOSIS — F10.90 ALCOHOL USE DISORDER: ICD-10-CM

## 2025-07-06 DIAGNOSIS — K85.20 ALCOHOL-INDUCED ACUTE PANCREATITIS, UNSPECIFIED COMPLICATION STATUS: Primary | ICD-10-CM

## 2025-07-06 DIAGNOSIS — I10 HYPERTENSION, POOR CONTROL: ICD-10-CM

## 2025-07-06 DIAGNOSIS — R07.9 CHEST PAIN: ICD-10-CM

## 2025-07-06 LAB
ACCEPTIBLE SP GR UR QL: 1.04 (ref 1–1.03)
ALBUMIN SERPL-MCNC: 4.7 G/DL (ref 3.5–5)
ALBUMIN/GLOB SERPL: 1.2 RATIO (ref 1.1–2)
ALP SERPL-CCNC: 96 UNIT/L (ref 40–150)
ALT SERPL-CCNC: 19 UNIT/L (ref 0–55)
AMPHET UR QL SCN: NEGATIVE
AMYLASE SERPL-CCNC: 255 UNIT/L (ref 25–125)
ANION GAP SERPL CALC-SCNC: 16 MEQ/L
AST SERPL-CCNC: 29 UNIT/L (ref 11–45)
BARBITURATE SCN PRESENT UR: NEGATIVE
BASOPHILS # BLD AUTO: 0.08 X10(3)/MCL
BASOPHILS NFR BLD AUTO: 0.6 %
BENZODIAZ UR QL SCN: POSITIVE
BILIRUB SERPL-MCNC: 1.8 MG/DL
BUN SERPL-MCNC: 8.5 MG/DL (ref 8.9–20.6)
CALCIUM SERPL-MCNC: 9.9 MG/DL (ref 8.4–10.2)
CANNABINOIDS UR QL SCN: POSITIVE
CHLORIDE SERPL-SCNC: 100 MMOL/L (ref 98–107)
CO2 SERPL-SCNC: 21 MMOL/L (ref 22–29)
COCAINE UR QL SCN: NEGATIVE
CREAT SERPL-MCNC: 0.83 MG/DL (ref 0.72–1.25)
CREAT/UREA NIT SERPL: 10
EOSINOPHIL # BLD AUTO: 0.04 X10(3)/MCL (ref 0–0.9)
EOSINOPHIL NFR BLD AUTO: 0.3 %
ERYTHROCYTE [DISTWIDTH] IN BLOOD BY AUTOMATED COUNT: 12.8 % (ref 11.5–17)
ETHANOL SERPL-MCNC: <10 MG/DL
FENTANYL UR QL SCN: NEGATIVE
GFR SERPLBLD CREATININE-BSD FMLA CKD-EPI: >60 ML/MIN/1.73/M2
GLOBULIN SER-MCNC: 4 GM/DL (ref 2.4–3.5)
GLUCOSE SERPL-MCNC: 88 MG/DL (ref 74–100)
HCT VFR BLD AUTO: 46.4 % (ref 42–52)
HGB BLD-MCNC: 16.3 G/DL (ref 14–18)
HOLD SPECIMEN: NORMAL
IMM GRANULOCYTES # BLD AUTO: 0.03 X10(3)/MCL (ref 0–0.04)
IMM GRANULOCYTES NFR BLD AUTO: 0.2 %
LDH SERPL-CCNC: 238 U/L (ref 125–220)
LIPASE SERPL-CCNC: 975 U/L
LYMPHOCYTES # BLD AUTO: 1.11 X10(3)/MCL (ref 0.6–4.6)
LYMPHOCYTES NFR BLD AUTO: 8.4 %
MCH RBC QN AUTO: 31.6 PG (ref 27–31)
MCHC RBC AUTO-ENTMCNC: 35.1 G/DL (ref 33–36)
MCV RBC AUTO: 89.9 FL (ref 80–94)
MDMA UR QL SCN: NEGATIVE
MONOCYTES # BLD AUTO: 0.71 X10(3)/MCL (ref 0.1–1.3)
MONOCYTES NFR BLD AUTO: 5.4 %
NEUTROPHILS # BLD AUTO: 11.23 X10(3)/MCL (ref 2.1–9.2)
NEUTROPHILS NFR BLD AUTO: 85.1 %
NRBC BLD AUTO-RTO: 0 %
OPIATES UR QL SCN: NEGATIVE
PCP UR QL: NEGATIVE
PH UR: 6.5 [PH] (ref 3–11)
PLATELET # BLD AUTO: 226 X10(3)/MCL (ref 130–400)
PMV BLD AUTO: 11 FL (ref 7.4–10.4)
POTASSIUM SERPL-SCNC: 4.1 MMOL/L (ref 3.5–5.1)
PROT SERPL-MCNC: 8.7 GM/DL (ref 6.4–8.3)
RBC # BLD AUTO: 5.16 X10(6)/MCL (ref 4.7–6.1)
SODIUM SERPL-SCNC: 137 MMOL/L (ref 136–145)
WBC # BLD AUTO: 13.2 X10(3)/MCL (ref 4.5–11.5)

## 2025-07-06 PROCEDURE — 83615 LACTATE (LD) (LDH) ENZYME: CPT

## 2025-07-06 PROCEDURE — 25000003 PHARM REV CODE 250

## 2025-07-06 PROCEDURE — 80307 DRUG TEST PRSMV CHEM ANLYZR: CPT | Performed by: EMERGENCY MEDICINE

## 2025-07-06 PROCEDURE — 63600175 PHARM REV CODE 636 W HCPCS

## 2025-07-06 PROCEDURE — 25500020 PHARM REV CODE 255: Performed by: EMERGENCY MEDICINE

## 2025-07-06 PROCEDURE — 63600175 PHARM REV CODE 636 W HCPCS: Performed by: EMERGENCY MEDICINE

## 2025-07-06 PROCEDURE — 82150 ASSAY OF AMYLASE: CPT

## 2025-07-06 PROCEDURE — 83690 ASSAY OF LIPASE: CPT | Performed by: EMERGENCY MEDICINE

## 2025-07-06 PROCEDURE — G0378 HOSPITAL OBSERVATION PER HR: HCPCS

## 2025-07-06 PROCEDURE — 80053 COMPREHEN METABOLIC PANEL: CPT | Performed by: EMERGENCY MEDICINE

## 2025-07-06 PROCEDURE — 82077 ASSAY SPEC XCP UR&BREATH IA: CPT | Performed by: EMERGENCY MEDICINE

## 2025-07-06 PROCEDURE — 85025 COMPLETE CBC W/AUTO DIFF WBC: CPT | Performed by: EMERGENCY MEDICINE

## 2025-07-06 RX ORDER — LABETALOL HCL 20 MG/4 ML
10 SYRINGE (ML) INTRAVENOUS EVERY 6 HOURS PRN
Status: DISCONTINUED | OUTPATIENT
Start: 2025-07-06 | End: 2025-07-08 | Stop reason: HOSPADM

## 2025-07-06 RX ORDER — IBUPROFEN 200 MG
24 TABLET ORAL
Status: DISCONTINUED | OUTPATIENT
Start: 2025-07-06 | End: 2025-07-08 | Stop reason: HOSPADM

## 2025-07-06 RX ORDER — ONDANSETRON 4 MG/1
4 TABLET, ORALLY DISINTEGRATING ORAL EVERY 6 HOURS PRN
Status: DISCONTINUED | OUTPATIENT
Start: 2025-07-06 | End: 2025-07-08 | Stop reason: HOSPADM

## 2025-07-06 RX ORDER — MORPHINE SULFATE 2 MG/ML
6 INJECTION, SOLUTION INTRAMUSCULAR; INTRAVENOUS
Refills: 0 | Status: COMPLETED | OUTPATIENT
Start: 2025-07-06 | End: 2025-07-06

## 2025-07-06 RX ORDER — SODIUM CHLORIDE 0.9 % (FLUSH) 0.9 %
10 SYRINGE (ML) INJECTION EVERY 12 HOURS PRN
Status: DISCONTINUED | OUTPATIENT
Start: 2025-07-06 | End: 2025-07-08 | Stop reason: HOSPADM

## 2025-07-06 RX ORDER — HYDRALAZINE HYDROCHLORIDE 20 MG/ML
10 INJECTION INTRAMUSCULAR; INTRAVENOUS EVERY 6 HOURS PRN
Status: DISCONTINUED | OUTPATIENT
Start: 2025-07-06 | End: 2025-07-06

## 2025-07-06 RX ORDER — MORPHINE SULFATE 2 MG/ML
2 INJECTION, SOLUTION INTRAMUSCULAR; INTRAVENOUS
Status: DISCONTINUED | OUTPATIENT
Start: 2025-07-06 | End: 2025-07-08 | Stop reason: HOSPADM

## 2025-07-06 RX ORDER — GLUCAGON 1 MG
1 KIT INJECTION
Status: DISCONTINUED | OUTPATIENT
Start: 2025-07-06 | End: 2025-07-08 | Stop reason: HOSPADM

## 2025-07-06 RX ORDER — LABETALOL HCL 20 MG/4 ML
10 SYRINGE (ML) INTRAVENOUS EVERY 6 HOURS PRN
Status: DISCONTINUED | OUTPATIENT
Start: 2025-07-06 | End: 2025-07-06

## 2025-07-06 RX ORDER — SODIUM CHLORIDE, SODIUM LACTATE, POTASSIUM CHLORIDE, CALCIUM CHLORIDE 600; 310; 30; 20 MG/100ML; MG/100ML; MG/100ML; MG/100ML
INJECTION, SOLUTION INTRAVENOUS CONTINUOUS
Status: DISCONTINUED | OUTPATIENT
Start: 2025-07-06 | End: 2025-07-08

## 2025-07-06 RX ORDER — NALOXONE HCL 0.4 MG/ML
0.02 VIAL (ML) INJECTION
Status: DISCONTINUED | OUTPATIENT
Start: 2025-07-06 | End: 2025-07-08 | Stop reason: HOSPADM

## 2025-07-06 RX ORDER — IBUPROFEN 200 MG
16 TABLET ORAL
Status: DISCONTINUED | OUTPATIENT
Start: 2025-07-06 | End: 2025-07-08 | Stop reason: HOSPADM

## 2025-07-06 RX ORDER — PROCHLORPERAZINE EDISYLATE 5 MG/ML
10 INJECTION INTRAMUSCULAR; INTRAVENOUS
Status: COMPLETED | OUTPATIENT
Start: 2025-07-06 | End: 2025-07-06

## 2025-07-06 RX ORDER — TALC
6 POWDER (GRAM) TOPICAL NIGHTLY PRN
Status: DISCONTINUED | OUTPATIENT
Start: 2025-07-06 | End: 2025-07-08 | Stop reason: HOSPADM

## 2025-07-06 RX ORDER — AMLODIPINE BESYLATE 10 MG/1
10 TABLET ORAL DAILY
Status: DISCONTINUED | OUTPATIENT
Start: 2025-07-07 | End: 2025-07-08 | Stop reason: HOSPADM

## 2025-07-06 RX ORDER — FOLIC ACID 1 MG/1
1 TABLET ORAL DAILY
Status: DISCONTINUED | OUTPATIENT
Start: 2025-07-07 | End: 2025-07-08 | Stop reason: HOSPADM

## 2025-07-06 RX ORDER — HYDRALAZINE HYDROCHLORIDE 20 MG/ML
10 INJECTION INTRAMUSCULAR; INTRAVENOUS EVERY 6 HOURS PRN
Status: DISCONTINUED | OUTPATIENT
Start: 2025-07-06 | End: 2025-07-08 | Stop reason: HOSPADM

## 2025-07-06 RX ORDER — PANTOPRAZOLE SODIUM 40 MG/1
40 TABLET, DELAYED RELEASE ORAL DAILY
Status: DISCONTINUED | OUTPATIENT
Start: 2025-07-07 | End: 2025-07-08 | Stop reason: HOSPADM

## 2025-07-06 RX ORDER — LORAZEPAM 2 MG/ML
2 INJECTION INTRAMUSCULAR EVERY 4 HOURS PRN
Status: DISCONTINUED | OUTPATIENT
Start: 2025-07-06 | End: 2025-07-08 | Stop reason: HOSPADM

## 2025-07-06 RX ORDER — THIAMINE HCL 100 MG
100 TABLET ORAL DAILY
Status: DISCONTINUED | OUTPATIENT
Start: 2025-07-07 | End: 2025-07-08 | Stop reason: HOSPADM

## 2025-07-06 RX ADMIN — HYDRALAZINE HYDROCHLORIDE 10 MG: 20 INJECTION INTRAMUSCULAR; INTRAVENOUS at 10:07

## 2025-07-06 RX ADMIN — SODIUM CHLORIDE, POTASSIUM CHLORIDE, SODIUM LACTATE AND CALCIUM CHLORIDE: 600; 310; 30; 20 INJECTION, SOLUTION INTRAVENOUS at 10:07

## 2025-07-06 RX ADMIN — ONDANSETRON 4 MG: 4 TABLET, ORALLY DISINTEGRATING ORAL at 11:07

## 2025-07-06 RX ADMIN — SODIUM CHLORIDE, POTASSIUM CHLORIDE, SODIUM LACTATE AND CALCIUM CHLORIDE 1000 ML: 600; 310; 30; 20 INJECTION, SOLUTION INTRAVENOUS at 08:07

## 2025-07-06 RX ADMIN — MORPHINE SULFATE 6 MG: 2 INJECTION, SOLUTION INTRAMUSCULAR; INTRAVENOUS at 09:07

## 2025-07-06 RX ADMIN — PROCHLORPERAZINE EDISYLATE 10 MG: 5 INJECTION INTRAMUSCULAR; INTRAVENOUS at 08:07

## 2025-07-06 RX ADMIN — IOHEXOL 100 ML: 350 INJECTION, SOLUTION INTRAVENOUS at 09:07

## 2025-07-07 LAB
ALBUMIN SERPL-MCNC: 4 G/DL (ref 3.5–5)
ALBUMIN/GLOB SERPL: 1.2 RATIO (ref 1.1–2)
ALP SERPL-CCNC: 81 UNIT/L (ref 40–150)
ALT SERPL-CCNC: 15 UNIT/L (ref 0–55)
ANION GAP SERPL CALC-SCNC: 14 MEQ/L
AST SERPL-CCNC: 24 UNIT/L (ref 11–45)
BASOPHILS # BLD AUTO: 0.07 X10(3)/MCL
BASOPHILS NFR BLD AUTO: 0.6 %
BILIRUB SERPL-MCNC: 1.6 MG/DL
BUN SERPL-MCNC: 7.8 MG/DL (ref 8.9–20.6)
CALCIUM SERPL-MCNC: 9 MG/DL (ref 8.4–10.2)
CHLORIDE SERPL-SCNC: 100 MMOL/L (ref 98–107)
CO2 SERPL-SCNC: 22 MMOL/L (ref 22–29)
CREAT SERPL-MCNC: 0.79 MG/DL (ref 0.72–1.25)
CREAT/UREA NIT SERPL: 10
EOSINOPHIL # BLD AUTO: 0.05 X10(3)/MCL (ref 0–0.9)
EOSINOPHIL NFR BLD AUTO: 0.4 %
ERYTHROCYTE [DISTWIDTH] IN BLOOD BY AUTOMATED COUNT: 12.9 % (ref 11.5–17)
GFR SERPLBLD CREATININE-BSD FMLA CKD-EPI: >60 ML/MIN/1.73/M2
GLOBULIN SER-MCNC: 3.4 GM/DL (ref 2.4–3.5)
GLUCOSE SERPL-MCNC: 85 MG/DL (ref 74–100)
HCT VFR BLD AUTO: 45 % (ref 42–52)
HGB BLD-MCNC: 15.2 G/DL (ref 14–18)
IMM GRANULOCYTES # BLD AUTO: 0.03 X10(3)/MCL (ref 0–0.04)
IMM GRANULOCYTES NFR BLD AUTO: 0.3 %
LYMPHOCYTES # BLD AUTO: 0.7 X10(3)/MCL (ref 0.6–4.6)
LYMPHOCYTES NFR BLD AUTO: 6.3 %
MAGNESIUM SERPL-MCNC: 1.9 MG/DL (ref 1.6–2.6)
MCH RBC QN AUTO: 30.8 PG (ref 27–31)
MCHC RBC AUTO-ENTMCNC: 33.8 G/DL (ref 33–36)
MCV RBC AUTO: 91.3 FL (ref 80–94)
MONOCYTES # BLD AUTO: 0.72 X10(3)/MCL (ref 0.1–1.3)
MONOCYTES NFR BLD AUTO: 6.4 %
NEUTROPHILS # BLD AUTO: 9.63 X10(3)/MCL (ref 2.1–9.2)
NEUTROPHILS NFR BLD AUTO: 86 %
NRBC BLD AUTO-RTO: 0 %
PHOSPHATE SERPL-MCNC: 3.9 MG/DL (ref 2.3–4.7)
PLATELET # BLD AUTO: 189 X10(3)/MCL (ref 130–400)
PMV BLD AUTO: 11.9 FL (ref 7.4–10.4)
POTASSIUM SERPL-SCNC: 4.6 MMOL/L (ref 3.5–5.1)
PROT SERPL-MCNC: 7.4 GM/DL (ref 6.4–8.3)
RBC # BLD AUTO: 4.93 X10(6)/MCL (ref 4.7–6.1)
SODIUM SERPL-SCNC: 136 MMOL/L (ref 136–145)
WBC # BLD AUTO: 11.2 X10(3)/MCL (ref 4.5–11.5)

## 2025-07-07 PROCEDURE — 63600175 PHARM REV CODE 636 W HCPCS

## 2025-07-07 PROCEDURE — 85025 COMPLETE CBC W/AUTO DIFF WBC: CPT

## 2025-07-07 PROCEDURE — 83735 ASSAY OF MAGNESIUM: CPT

## 2025-07-07 PROCEDURE — 25000003 PHARM REV CODE 250

## 2025-07-07 PROCEDURE — 36415 COLL VENOUS BLD VENIPUNCTURE: CPT

## 2025-07-07 PROCEDURE — 80053 COMPREHEN METABOLIC PANEL: CPT

## 2025-07-07 PROCEDURE — G0378 HOSPITAL OBSERVATION PER HR: HCPCS

## 2025-07-07 PROCEDURE — 84100 ASSAY OF PHOSPHORUS: CPT

## 2025-07-07 RX ORDER — PROCHLORPERAZINE EDISYLATE 5 MG/ML
10 INJECTION INTRAMUSCULAR; INTRAVENOUS ONCE
Status: COMPLETED | OUTPATIENT
Start: 2025-07-07 | End: 2025-07-07

## 2025-07-07 RX ORDER — LISINOPRIL 10 MG/1
10 TABLET ORAL NIGHTLY
Status: DISCONTINUED | OUTPATIENT
Start: 2025-07-07 | End: 2025-07-08 | Stop reason: HOSPADM

## 2025-07-07 RX ORDER — LABETALOL 100 MG/1
200 TABLET, FILM COATED ORAL EVERY 12 HOURS
Status: DISCONTINUED | OUTPATIENT
Start: 2025-07-07 | End: 2025-07-07

## 2025-07-07 RX ADMIN — MORPHINE SULFATE 2 MG: 2 INJECTION, SOLUTION INTRAMUSCULAR; INTRAVENOUS at 12:07

## 2025-07-07 RX ADMIN — SODIUM CHLORIDE, POTASSIUM CHLORIDE, SODIUM LACTATE AND CALCIUM CHLORIDE: 600; 310; 30; 20 INJECTION, SOLUTION INTRAVENOUS at 12:07

## 2025-07-07 RX ADMIN — PANTOPRAZOLE 40 MG: 40 TABLET, DELAYED RELEASE ORAL at 09:07

## 2025-07-07 RX ADMIN — THERA TABS 1 TABLET: TAB at 09:07

## 2025-07-07 RX ADMIN — MORPHINE SULFATE 2 MG: 2 INJECTION, SOLUTION INTRAMUSCULAR; INTRAVENOUS at 03:07

## 2025-07-07 RX ADMIN — SODIUM CHLORIDE, POTASSIUM CHLORIDE, SODIUM LACTATE AND CALCIUM CHLORIDE: 600; 310; 30; 20 INJECTION, SOLUTION INTRAVENOUS at 05:07

## 2025-07-07 RX ADMIN — LISINOPRIL 10 MG: 10 TABLET ORAL at 09:07

## 2025-07-07 RX ADMIN — FOLIC ACID 1 MG: 1 TABLET ORAL at 09:07

## 2025-07-07 RX ADMIN — PROCHLORPERAZINE EDISYLATE 10 MG: 5 INJECTION INTRAMUSCULAR; INTRAVENOUS at 03:07

## 2025-07-07 RX ADMIN — Medication 6 MG: at 09:07

## 2025-07-07 RX ADMIN — LABETALOL HYDROCHLORIDE 200 MG: 100 TABLET, FILM COATED ORAL at 09:07

## 2025-07-07 RX ADMIN — MORPHINE SULFATE 2 MG: 2 INJECTION, SOLUTION INTRAMUSCULAR; INTRAVENOUS at 09:07

## 2025-07-07 RX ADMIN — Medication 100 MG: at 09:07

## 2025-07-07 RX ADMIN — AMLODIPINE BESYLATE 10 MG: 10 TABLET ORAL at 09:07

## 2025-07-07 NOTE — PLAN OF CARE
07/07/25 1455   Discharge Assessment   Assessment Type Discharge Planning Assessment   Confirmed/corrected address, phone number and insurance Yes   Confirmed Demographics Correct on Facesheet   Source of Information patient;health record   Communicated ABIGAIL with patient/caregiver Date not available/Unable to determine   People in Home spouse   Name(s) of People in Home Donald Omalley, spouse, P: [phone]   Facility Arrived From: Home   Do you expect to return to your current living situation? Yes   Do you have help at home or someone to help you manage your care at home? Yes   Who are your caregiver(s) and their phone number(s)? Donald Oamlley, spouse, P: [phone]   Prior to hospitilization cognitive status: Alert/Oriented;No Deficits   Current cognitive status: Alert/Oriented;No Deficits   Walking or Climbing Stairs Difficulty no   Dressing/Bathing Difficulty no   Equipment Currently Used at Home none   Readmission within 30 days? No   Patient currently being followed by outpatient case management? No   Do you currently have service(s) that help you manage your care at home? No   Do you take prescription medications? Yes   Do you have prescription coverage? Yes   Do you have any problems affording any of your prescribed medications? No   Is the patient taking medications as prescribed? yes   Who is going to help you get home at discharge? Family   How do you get to doctors appointments? car, drives self;family or friend will provide   Are you on dialysis? No   Do you take coumadin? No   Discharge Plan A Home with family   DME Needed Upon Discharge  none   Discharge Plan discussed with: Patient   Transition of Care Barriers None     Patient is  and has 2 children ages 4 & 13; works at [employer]; has been to [facility] and had been sober for 7-8 months before relapse on July 4th; CM will follow for DC planning needs.

## 2025-07-07 NOTE — DISCHARGE INSTRUCTIONS
Our goal at [facility] is to always give you outstanding care and exceptional service. You may receive a survey from [employer] by mail, text or e-mail in the next 24-48 hours asking about the care you received with us. The survey should only take 5-10 minutes to complete and is very important to us.     Your feedback provides us with a way to recognize our staff who work tirelessly to provide the best care! Also, your responses help us learn how to improve when your experience was below our aspiration of excellence. We are always looking for ways to improve your stay. We WILL use your feedback to continue making improvements to help us provide the highest quality care. We keep your personal information and feedback confidential. We appreciate your time completing this survey and can't wait to hear from you!!!    We look forward to your continued care with us! Thanks so much for choosing [facility] for your healthcare needs!

## 2025-07-07 NOTE — PROGRESS NOTES
"[facility]  History & Physical        Patient Name: Osei Wellington  : 1995  MRN: [id number]  Patient Class: OP- Observation   Admission Date: 2025   Length of Stay: 0  Admitting Service: Hospital Medicine  Attending Physician: Jonny Riojas MD  PCP: Tati, Primary Doctor  Source of history: Patient, patient's family, and EMR.   Code status: Full Code     Chief Complaint   Abdominal Pain and Nausea      History of Present Illness   30 y.o. male with PMH of Pancreatitis and hypertension. presents to ED for abdominal pain and nausea, similar symptoms when he experienced pancreatitis in 2024. Patient states onset of abdominal pain was this morning and gradually worsening that he could not tolerate the pain. He rates 9/10 severity with sharp and localized at RUQ to mid upper abdomen. He also felt nauseated without vomiting. His grandmother gave him a "white pill" for nausea without improvement. He also admitted having drinking alcohol heavily for  celebration. He reports having history of alcohol abuse that caused pancreatitis and led him into rehab after discharged in 2024. He states had not been drinking alcohol after rehab for 7-8 months until 2025. He also admits he is not compliant to medications for hypertension. He denies fever/chills, HA, CP, SOB, sick-contact, diarrhea/constipation, falls, muscle or joint pain, depression and anxiety.       In ED, upon arrival, patient afebrile T 99, HR 73, RR 18, /113, SpO2 100% on room air. Labs significantly showed WBC 13.2, CO2 21, BUN/Cr 8.5/0.83, Bili total 1.8, Amylase 255, Lipase 975, . UDS revealed Benzo and Cannabinoids positive. CT A/P revealed edematous pancrease and liver with fatty infiltration. Patient was given LR, morphine, and prochlorperazine.      Interval History:  No acute events overnight. Patient endorses gradual improvement of abdominal pain. Nauseated and " intermittent dry-heaving. Denies chest pain, SOB, dysuria, anuria, fever/chills     ROS   Pertinent positive and negative as mentioned in HPI   ROS  Past Medical History           Past Medical History:   Diagnosis Date    Pancreatitis 2020      Past Surgical History   History reviewed. No pertinent surgical history.  Social History      Social History            Tobacco Use    Smoking status: Never    Smokeless tobacco: Never   Substance Use Topics    Alcohol use: Yes       Comment: Binge drinker      Family History   Reviewed and noncontributory     Allergies   Patient has no known allergies.  Home Medications              Prior to Admission medications    Medication Sig Start Date End Date Taking? Authorizing Provider   amLODIPine (NORVASC) 10 MG tablet Take 1 tablet (10 mg total) by mouth once daily. 12/6/23 12/5/24   Monet Forrester MD   lisinopriL (PRINIVIL,ZESTRIL) 40 MG tablet Take 1 tablet (40 mg total) by mouth once daily. 11/8/24 11/8/25   Milton Rob MD   ondansetron (ZOFRAN-ODT) 4 MG TbDL Take 1 tablet (4 mg total) by mouth every 6 (six) hours as needed. 12/29/24     Kareem Morejon MD      Inpatient Medications   Scheduled Meds   [START ON 7/7/2025] amLODIPine  10 mg Oral Daily    [START ON 7/7/2025] folic acid  1 mg Oral Daily    [START ON 7/7/2025] multivitamin  1 tablet Oral Daily    [START ON 7/7/2025] pantoprazole  40 mg Oral Daily    [START ON 7/7/2025] thiamine  100 mg Oral Daily      Continuous Infusions   lactated ringers   Intravenous Continuous 150 mL/hr at 07/06/25 2208 New Bag at 07/06/25 2208      PRN Meds     Current Facility-Administered Medications:     dextrose 50%, 12.5 g, Intravenous, PRN    dextrose 50%, 25 g, Intravenous, PRN    glucagon (human recombinant), 1 mg, Intramuscular, PRN    glucose, 16 g, Oral, PRN    glucose, 24 g, Oral, PRN    hydrALAZINE, 10 mg, Intravenous, Q6H PRN    labetalol, 10 mg, Intravenous, Q6H PRN    lorazepam, 2 mg, Intravenous, Q4H  PRN    melatonin, 6 mg, Oral, Nightly PRN    morphine, 2 mg, Intravenous, Q2H PRN    naloxone, 0.02 mg, Intravenous, PRN    ondansetron, 4 mg, Oral, Q6H PRN    sodium chloride 0.9%, 10 mL, Intravenous, Q12H PRN     Physical Exam   Vital Signs  Temp:  [99 °F (37.2 °C)]   Pulse:  [68-80]   Resp:  [16-18]   BP: (180-214)/(104-128)   SpO2:  [9 %-100 %]       Physical Exam  Vitals reviewed.   Constitutional:       General: He is not in acute distress.  HENT:      Head: Normocephalic and atraumatic.      Nose: No congestion or rhinorrhea.      Mouth/Throat:      Mouth: Mucous membranes are dry.      Pharynx: Oropharynx is clear.   Eyes:      General: No scleral icterus.     Extraocular Movements: Extraocular movements intact.   Cardiovascular:      Rate and Rhythm: Normal rate and regular rhythm.      Pulses: Normal pulses.      Heart sounds: Normal heart sounds. No murmur heard.  Pulmonary:      Effort: Pulmonary effort is normal.      Breath sounds: Normal breath sounds. No wheezing.   Abdominal:      Tenderness: There is abdominal tenderness (epigastric and RUQ).      Comments: Hyperactive BS throughout    Musculoskeletal:         General: No swelling or tenderness.      Cervical back: No rigidity or tenderness.   Skin:     General: Skin is warm and dry.      Capillary Refill: Capillary refill takes less than 2 seconds.   Neurological:      General: No focal deficit present.      Mental Status: He is oriented to person, place, and time.   Psychiatric:         Mood and Affect: Mood normal.         Thought Content: Thought content normal.            Labs   I have reviewed the following results below:  CBC      Recent Labs     07/06/25 2023   WBC 13.20*   RBC 5.16   HGB 16.3   HCT 46.4   MCV 89.9   MCH 31.6*   MCHC 35.1   RDW 12.8             BMP      Recent Labs     07/06/25 2023      K 4.1   CO2 21*   BUN 8.5*   CREATININE 0.83   CALCIUM 9.9      LFTs      Recent Labs     07/06/25 2023   ALBUMIN 4.7    GLOBULIN 4.0*   ALKPHOS 96   ALT 19   AST 29   BILITOT 1.8*   LIPASE 975*      Inflammatory Markers      Recent Labs     07/06/25 2030   *                Imaging      Imaging Results                  CT Abdomen Pelvis With IV Contrast NO Oral Contrast (Final result)  Result time 07/06/25 21:18:34            Final result by Anderson Wylie MD (07/06/25 21:18:34)                           Impression:        Changes consistent with acute pancreatitis an abscess or phlegmon is not demonstrated is not demonstrated.        Electronically signed by:Anderson Wylie MD  Date:                                            07/06/2025  Time:                                           21:18                     Narrative:     EXAMINATION:  CT ABDOMEN PELVIS WITH IV CONTRAST     CLINICAL HISTORY:  Pancreatitis, acute, severe;     TECHNIQUE:  Low dose axial images, sagittal and coronal reformations were obtained from the lung bases to the pubic symphysis following the IV administration of 100 mL of Omnipaque 350 no oral contrast was given.     Automatic exposure control (AEC) was utilized for dose reduction.     Dose:   187 mGycm     COMPARISON:  12/29/2024     FINDINGS:  Lung bases appear clear.  There is fatty infiltration of the liver.  Spleen appears normal.  The pancreas is edematous.  A localized fluid collection is not noted.  Gallbladder appears normal.  Biliary system appears normal.  The adrenals are not enlarged.  Kidneys appear normal.  Aorta shows no evidence of an aneurysm                                            Assessment & Plan      Acute pancreatitis, 2/2 alcohol abuse  Leukocytosis, likely d/t inflammatory response to acute pancreatitis  Nausea and vomiting  Epigastric and RUQ tenderness w/o radiation   CT A/P revealed edematous pancreas  Lipase 975, Amylase 255,  and WBC 13.2 on admission  LR at 150 mL/hr  Zofran 4 mg q6h PRN for N/V  Morphine 2 mg IV q4h PRN for severe pain  Initiated Ibuprofen  "for PRN pain  Monitor Mg and Phos levels w/ AM labs  Initiate clear liquid diet. If not tolerated, NPO.      Alcohol abuse  Alcohol level <10 on admission  Alcohol abstinence for 7-8 months and started drinking on July 4, 2025  Discussed alcohol cessation; will provide outpatient resources  Start folic acid 1 mg, thiamine 100 mg, and multivitamin 1 tab PO daily  Replete electrolytes as needed  [facility] protocol     UDS: Benzodiazepine and cannabinoids positive  Patient denies taking Benzodiazepine, but admitted took a "white pill" grandmother gave him for nausea     Hx of HTN  /113 on admission  BP today 187/89  Patient not adherent to prescribed home BP medications  Initiate amlodipine 10 mg PO daily  Intitiated Labetalol 200mg BID  Holding home lisinopril  Hydralazine and labetalol PRN on board  CTM q4h    CODE STATUS: Full Code  Access: Peripheral  Antibiotics: None  Diet: Clear Liquid  DVT Prophylaxis: Teds/SCDs  GI Prophylaxis: proton pump inhibitor   Fluids: lactated Ringer's 150 mL/hr.      Disposition: day 1 of admission for management of acute pancreatitis. Discharge pending.      Vinny Tucker MD  [facility], HO-1  07/06/2025        "

## 2025-07-07 NOTE — H&P
"[facility]  History & Physical      Patient Name: Osei Wellington  : 1995  MRN: [id number]  Patient Class: OP- Observation   Admission Date: 2025   Length of Stay: 0  Admitting Service: Hospital Medicine  Attending Physician: Jonny Riojas MD  PCP: Tati, Primary Doctor  Source of history: Patient, patient's family, and EMR.   Code status: Full Code    Chief Complaint   Abdominal Pain and Nausea     History of Present Illness   30 y.o. male with PMH of Pancreatitis and hypertension. presents to ED for abdominal pain and nausea, similar symptoms when he experienced pancreatitis in 2024. Patient states onset of abdominal pain was this morning and gradually worsening that he could not tolerate the pain. He rates 9/10 severity with sharp and localized at RUQ to mid upper abdomen. He also felt nauseated without vomiting. His grandmother gave him a "white pill" for nausea without improvement. He also admitted having drinking alcohol heavily for  celebration. He reports having history of alcohol abuse that caused pancreatitis and led him into rehab after discharged in 2024. He states had not been drinking alcohol after rehab for 7-8 months until 2025. He also admits he is not compliant to medications for hypertension. He denies fever/chills, HA, CP, SOB, sick-contact, diarrhea/constipation, falls, muscle or joint pain, depression and anxiety.      In ED, upon arrival, patient afebrile T 99, HR 73, RR 18, /113, SpO2 100% on room air. Labs significantly showed WBC 13.2, CO2 21, BUN/Cr 8.5/0.83, Bili total 1.8, Amylase 255, Lipase 975, . UDS revealed Benzo and Cannabinoids positive. CT A/P revealed edematous pancrease and liver with fatty infiltration. Patient was given LR, morphine, and prochlorperazine.     Patient admitted to Hospital Medicine service under observation for further management of acute pancreatitis.     ROS "   Pertinent positive and negative as mentioned in HPI   ROS  Past Medical History     Past Medical History:   Diagnosis Date    Pancreatitis 2020     Past Surgical History   History reviewed. No pertinent surgical history.  Social History     Social History     Tobacco Use    Smoking status: Never    Smokeless tobacco: Never   Substance Use Topics    Alcohol use: Yes     Comment: Binge drinker      Family History   Reviewed and noncontributory    Allergies   Patient has no known allergies.  Home Medications     Prior to Admission medications    Medication Sig Start Date End Date Taking? Authorizing Provider   amLODIPine (NORVASC) 10 MG tablet Take 1 tablet (10 mg total) by mouth once daily. 12/6/23 12/5/24  Monet Forrester MD   lisinopriL (PRINIVIL,ZESTRIL) 40 MG tablet Take 1 tablet (40 mg total) by mouth once daily. 11/8/24 11/8/25  Milton Rob MD   ondansetron (ZOFRAN-ODT) 4 MG TbDL Take 1 tablet (4 mg total) by mouth every 6 (six) hours as needed. 12/29/24   Kareem Morejon MD      Inpatient Medications   Scheduled Meds   [START ON 7/7/2025] amLODIPine  10 mg Oral Daily    [START ON 7/7/2025] folic acid  1 mg Oral Daily    [START ON 7/7/2025] multivitamin  1 tablet Oral Daily    [START ON 7/7/2025] pantoprazole  40 mg Oral Daily    [START ON 7/7/2025] thiamine  100 mg Oral Daily     Continuous Infusions   lactated ringers   Intravenous Continuous 150 mL/hr at 07/06/25 2208 New Bag at 07/06/25 2208     PRN Meds    Current Facility-Administered Medications:     dextrose 50%, 12.5 g, Intravenous, PRN    dextrose 50%, 25 g, Intravenous, PRN    glucagon (human recombinant), 1 mg, Intramuscular, PRN    glucose, 16 g, Oral, PRN    glucose, 24 g, Oral, PRN    hydrALAZINE, 10 mg, Intravenous, Q6H PRN    labetalol, 10 mg, Intravenous, Q6H PRN    lorazepam, 2 mg, Intravenous, Q4H PRN    melatonin, 6 mg, Oral, Nightly PRN    morphine, 2 mg, Intravenous, Q2H PRN    naloxone, 0.02 mg, Intravenous, PRN     ondansetron, 4 mg, Oral, Q6H PRN    sodium chloride 0.9%, 10 mL, Intravenous, Q12H PRN    Physical Exam   Vital Signs  Temp:  [99 °F (37.2 °C)]   Pulse:  [68-80]   Resp:  [16-18]   BP: (180-214)/(104-128)   SpO2:  [9 %-100 %]       Physical Exam  Vitals reviewed.   Constitutional:       General: He is not in acute distress.  HENT:      Head: Normocephalic and atraumatic.      Nose: No congestion or rhinorrhea.      Mouth/Throat:      Mouth: Mucous membranes are dry.      Pharynx: Oropharynx is clear.   Eyes:      General: No scleral icterus.     Extraocular Movements: Extraocular movements intact.   Cardiovascular:      Rate and Rhythm: Normal rate and regular rhythm.      Pulses: Normal pulses.      Heart sounds: Normal heart sounds. No murmur heard.  Pulmonary:      Effort: Pulmonary effort is normal.      Breath sounds: Normal breath sounds. No wheezing.   Abdominal:      Tenderness: There is abdominal tenderness (epigastric and RUQ).      Comments: Hyperactive BS throughout    Musculoskeletal:         General: No swelling or tenderness.      Cervical back: No rigidity or tenderness.   Skin:     General: Skin is warm and dry.      Capillary Refill: Capillary refill takes less than 2 seconds.   Neurological:      General: No focal deficit present.      Mental Status: He is oriented to person, place, and time.   Psychiatric:         Mood and Affect: Mood normal.         Thought Content: Thought content normal.          Labs   I have reviewed the following results below:  CBC  Recent Labs     07/06/25 2023   WBC 13.20*   RBC 5.16   HGB 16.3   HCT 46.4   MCV 89.9   MCH 31.6*   MCHC 35.1   RDW 12.8           BMP  Recent Labs     07/06/25 2023      K 4.1   CO2 21*   BUN 8.5*   CREATININE 0.83   CALCIUM 9.9     LFTs  Recent Labs     07/06/25 2023   ALBUMIN 4.7   GLOBULIN 4.0*   ALKPHOS 96   ALT 19   AST 29   BILITOT 1.8*   LIPASE 975*     Inflammatory Markers  Recent Labs     07/06/25 2030   *             Imaging     Imaging Results              CT Abdomen Pelvis With IV Contrast NO Oral Contrast (Final result)  Result time 07/06/25 21:18:34      Final result by Anderson Wylie MD (07/06/25 21:18:34)                   Impression:      Changes consistent with acute pancreatitis an abscess or phlegmon is not demonstrated is not demonstrated.      Electronically signed by: Anderson Wylie MD  Date:    07/06/2025  Time:    21:18               Narrative:    EXAMINATION:  CT ABDOMEN PELVIS WITH IV CONTRAST    CLINICAL HISTORY:  Pancreatitis, acute, severe;    TECHNIQUE:  Low dose axial images, sagittal and coronal reformations were obtained from the lung bases to the pubic symphysis following the IV administration of 100 mL of Omnipaque 350 no oral contrast was given.    Automatic exposure control (AEC) was utilized for dose reduction.    Dose:   187 mGycm    COMPARISON:  12/29/2024    FINDINGS:  Lung bases appear clear.  There is fatty infiltration of the liver.  Spleen appears normal.  The pancreas is edematous.  A localized fluid collection is not noted.  Gallbladder appears normal.  Biliary system appears normal.  The adrenals are not enlarged.  Kidneys appear normal.  Aorta shows no evidence of an aneurysm                                     Assessment & Plan     Acute pancreatitis, 2/2 alcohol abuse  Leukocytosis, likely d/t inflammatory response to acute pancreatitis  Nausea and vomiting  Epigastric and RUQ tenderness w/o radiation   CT A/P revealed edematous pancreas  Lipase 975, Amylase 255,  and WBC 13.2 on admission  LR at 150 mL/hr  Zofran 4 mg q6h PRN for N/V  Morphine 2 mg IV q4h PRN for severe pain  Monitor Mg and Phos levels w/ AM labs  Initiate clear liquid diet. If not tolerated, NPO.      Alcohol abuse  Alcohol level <10 on admission  Alcohol abstinence for 7-8 months and started drinking on July 4, 2025  Discussed alcohol cessation   Start folic acid 1 mg, thiamine 100 mg, and  "multivitamin 1 tab PO daily  Replete electrolytes as needed  [facility] protocol     UDS: Benzodiazepine and cannabinoids positive  Patient denies taking Benzodiazepine, but admitted took a "white pill" grandmother gave him for nausea    Hx of HTN  /113 on admission  Patient not adherent to prescribed home BP medications  Initiate amlodipine 10 mg PO daily  Holding home lisinopril  Hydralazine and labetalol PRN on board  CTM q4h    CODE STATUS: Full Code  Access: Peripheral  Antibiotics: None  Diet: Clear Liquid  DVT Prophylaxis: Teds/SCDs  GI Prophylaxis: proton pump inhibitor   Fluids: lactated Ringer's 150 mL/hr.     Disposition: day 0 of admission for management of acute pancreatitis. Discharge pending.     Malik Stephens MD  [facility], HO-1  07/06/2025       "

## 2025-07-07 NOTE — ED PROVIDER NOTES
Encounter Date: 7/6/2025       History     Chief Complaint   Patient presents with    Abdominal Pain    Nausea     Abdominal pain with nausea since 6 a.m. today. Also states history of pancreatitis. Rates pain 8/10. No active vomiting noted at this time. Iz=916/113     He has a history of alcohol-induced pancreatitis, 3 or 4 episodes, has been hospitalized, last episode about 7 or 8 months ago.  He does consider himself a treated alcoholic but did relapse with alcohol use over this last weekend and developed symptoms beginning this morning of upper epigastric abdominal pain with nausea, retching, some radiation to his back.  Some uncertain nausea medication given by a family member but still quite symptomatic.  He has been diagnosed with hypertension but has not followed up with regular blood pressure medicine.  Occasional use of marijuana, denies other drugs.  No fever, dyspnea, urinary complaints, or other symptoms.  This episode feels like his previous episodes of pancreatitis.    The history is provided by the patient. No  was used.     Review of patient's allergies indicates:  No Known Allergies  Past Medical History:   Diagnosis Date    Pancreatitis 2020     History reviewed. No pertinent surgical history.  No family history on file.  Social History[1]  Review of Systems   Gastrointestinal:  Positive for abdominal pain, nausea and vomiting.   Musculoskeletal:  Positive for back pain.       Physical Exam     Initial Vitals [07/06/25 2004]   BP Pulse Resp Temp SpO2   (!) 214/113 73 18 99 °F (37.2 °C) (!) 9 %      MAP       --         Physical Exam    Nursing note and vitals reviewed.  Constitutional: He appears well-developed and well-nourished. He appears distressed.   HENT:   Head: Normocephalic and atraumatic.   Eyes: EOM are normal. Pupils are equal, round, and reactive to light.   Neck: Neck supple.   Normal range of motion.  Cardiovascular:  Normal rate and regular rhythm.            Pulmonary/Chest: Breath sounds normal. No respiratory distress.   Abdominal: Abdomen is soft. Bowel sounds are normal. He exhibits no distension. There is abdominal tenderness.   Mild upper epigastric tenderness There is no rebound and no guarding.   Musculoskeletal:         General: No tenderness. Normal range of motion.      Cervical back: Normal range of motion and neck supple.     Neurological: He is alert and oriented to person, place, and time. He has normal strength.   Skin: Skin is warm and dry.   Psychiatric: He has a normal mood and affect. His behavior is normal. Judgment and thought content normal.         ED Course   Procedures  Labs Reviewed   COMPREHENSIVE METABOLIC PANEL - Abnormal       Result Value    Sodium 137      Potassium 4.1      Chloride 100      CO2 21 (*)     Glucose 88      Blood Urea Nitrogen 8.5 (*)     Creatinine 0.83      Calcium 9.9      Protein Total 8.7 (*)     Albumin 4.7      Globulin 4.0 (*)     Albumin/Globulin Ratio 1.2      Bilirubin Total 1.8 (*)     ALP 96      ALT 19      AST 29      eGFR >60      Anion Gap 16.0      BUN/Creatinine Ratio 10     DRUG SCREEN, URINE (BEAKER) - Abnormal    Amphetamines, Urine Negative      Barbiturates, Urine Negative      Benzodiazepine, Urine Positive (*)     Cannabinoids, Urine Positive (*)     Cocaine, Urine Negative      Fentanyl, Urine Negative      MDMA, Urine Negative      Opiates, Urine Negative      Phencyclidine, Urine Negative      pH, Urine 6.5      Specific Gravity, Urine Auto 1.040 (*)     Narrative:     Cut off concentrations:    Amphetamines - 1000 ng/ml  Barbiturates - 200 ng/ml  Benzodiazepine - 200 ng/ml  Cannabinoids (THC) - 50 ng/ml  Cocaine - 300 ng/ml  Fentanyl - 1.0 ng/ml  MDMA - 500 ng/ml  Opiates - 300 ng/ml   Phencyclidine (PCP) - 25 ng/ml    Specimen submitted for drug analysis and tested for pH and specific gravity in order to evaluate sample integrity. Suspect tampering if specific gravity is <1.003 and/or pH  is not within the range of 4.5 - 8.0  False negatives may result form substances such as bleach added to urine.  False positives may result for the presence of a substance with similar chemical structure to the drug or its metabolite.    This test provides only a PRELIMINARY analytical test result. A more specific alternate chemical method must be used in order to obtain a confirmed analytical result. Gas chromatography/mass spectrometry (GC/MS) is the preferred confirmatory method. Other chemical confirmation methods are available. Clinical consideration and professional judgement should be applied to any drug of abuse test result, particularly when preliminary positive results are used.    Positive results will be confirmed only at the physicians request. Unconfirmed screening results are to be used only for medical purposes (treatment).        LIPASE - Abnormal    Lipase Level 975 (*)    CBC WITH DIFFERENTIAL - Abnormal    WBC 13.20 (*)     RBC 5.16      Hgb 16.3      Hct 46.4      MCV 89.9      MCH 31.6 (*)     MCHC 35.1      RDW 12.8      Platelet 226      MPV 11.0 (*)     Neut % 85.1      Lymph % 8.4      Mono % 5.4      Eos % 0.3      Basophil % 0.6      Imm Grans % 0.2      Neut # 11.23 (*)     Lymph # 1.11      Mono # 0.71      Eos # 0.04      Baso # 0.08      Imm Gran # 0.03      NRBC% 0.0     AMYLASE - Abnormal    Amylase Level 255 (*)    LACTATE DEHYDROGENASE - Abnormal    Lactate Dehydrogenase 238 (*)    ALCOHOL,MEDICAL (ETHANOL) - Normal    Ethanol Level <10.0     CBC W/ AUTO DIFFERENTIAL    Narrative:     The following orders were created for panel order CBC auto differential.  Procedure                               Abnormality         Status                     ---------                               -----------         ------                     CBC with Differential[[id number]]       Abnormal            Final result                 Please view results for these tests on the individual orders.    EXTRA TUBES    Narrative:     The following orders were created for panel order EXTRA TUBES.  Procedure                               Abnormality         Status                     ---------                               -----------         ------                     Light Blue Top Hold[[id number]]                             In process                 Light Blue Top Hold[[id number]]                             In process                 Red Top Hold[[id number]]                                    In process                 Light Green Top Hold[[id number]]                            In process                 Lavender Top Hold[[id number]]                               In process                 Gold Top Hold[[id number]]                                   In process                 Gold Top Hold[[id number]]                                   In process                   Please view results for these tests on the individual orders.   LIGHT BLUE TOP HOLD   LIGHT BLUE TOP HOLD   RED TOP HOLD   LIGHT GREEN TOP HOLD   LAVENDER TOP HOLD   GOLD TOP HOLD   GOLD TOP HOLD          Imaging Results              CT Abdomen Pelvis With IV Contrast NO Oral Contrast (Final result)  Result time 07/06/25 21:18:34      Final result by Anderson Wylie MD (07/06/25 21:18:34)                   Impression:      Changes consistent with acute pancreatitis an abscess or phlegmon is not demonstrated is not demonstrated.      Electronically signed by: Anderson Wylie MD  Date:    07/06/2025  Time:    21:18               Narrative:    EXAMINATION:  CT ABDOMEN PELVIS WITH IV CONTRAST    CLINICAL HISTORY:  Pancreatitis, acute, severe;    TECHNIQUE:  Low dose axial images, sagittal and coronal reformations were obtained from the lung bases to the pubic symphysis following the IV administration of 100 mL of Omnipaque 350 no oral contrast was given.    Automatic exposure control (AEC) was utilized for dose reduction.    Dose:   187  mGycm    COMPARISON:  12/29/2024    FINDINGS:  Lung bases appear clear.  There is fatty infiltration of the liver.  Spleen appears normal.  The pancreas is edematous.  A localized fluid collection is not noted.  Gallbladder appears normal.  Biliary system appears normal.  The adrenals are not enlarged.  Kidneys appear normal.  Aorta shows no evidence of an aneurysm                                       Medications   lactated ringers bolus 1,000 mL (0 mLs Intravenous Stopped 7/6/25 2130)   prochlorperazine injection Soln 10 mg (10 mg Intravenous Given 7/6/25 2025)   iohexoL (OMNIPAQUE 350) injection 100 mL (100 mLs Intravenous Given 7/6/25 2107)   morphine injection 6 mg (6 mg Intravenous Given 7/6/25 2126)       9:21 PM Still having pain; meds given; data resulted; consulting Int Med.      Medical Decision Making  Recurrent alcoholic pancreatitis in a patient with known alcohol abuse and recent relapse, consult internal medicine for admission.  Also significantly elevated blood pressure with failure to follow-up on previous diagnosis of hypertension.    Problems Addressed:  Alcohol-induced acute pancreatitis, unspecified complication status: acute illness or injury  Hypertension, poor control: chronic illness or injury with exacerbation, progression, or side effects of treatment    Amount and/or Complexity of Data Reviewed  Labs: ordered. Decision-making details documented in ED Course.  Radiology: ordered. Decision-making details documented in ED Course.    Risk  Prescription drug management.  Parenteral controlled substances.  Decision regarding hospitalization.      Additional MDM:   Differential Diagnosis:   Alcoholic pancreatitis, gallstone pancreatitis, other causes of abdominal pain among many others                                    Clinical Impression:  Final diagnoses:  [K85.20] Alcohol-induced acute pancreatitis, unspecified complication status (Primary)  [I10] Hypertension, poor control          ED  Disposition Condition    Observation                     Ranulfo Kiser MD  07/06/25 7769         [1]   Social History  Tobacco Use    Smoking status: Never    Smokeless tobacco: Never   Substance Use Topics    Alcohol use: Yes     Comment: Binge drinker    Drug use: Yes     Types: Marijuana     Comment: Vape        Ranulfo Kiser MD  07/06/25 9937

## 2025-07-07 NOTE — PROGRESS NOTES
Inpatient Nutrition Assessment    Admit Date: 7/6/2025   Total duration of encounter: 1 day   Patient Age: 30 y.o.    Nutrition Recommendation/Prescription     ADAT to Low Fat/GI Soft diet  Continue vitamin supplementation as ordered  Monitor Weight Weekly     Communication of Recommendations: reviewed with nurse and reviewed with patient    Nutrition Assessment     Malnutrition Assessment/Nutrition-Focused Physical Exam    Malnutrition Context: acute illness or injury (07/07/25 1228)  Malnutrition Level: other (see comments) (Does not meet criteria) (07/07/25 1228)  Energy Intake (Malnutrition): other (see comments) (Does not meet criteria) (07/07/25 1228)  Weight Loss (Malnutrition): other (see comments) (Does not meet criteria) (07/07/25 1228)     Orbital Region (Subcutaneous Fat Loss): well nourished           Congregational Region (Muscle Loss): well nourished                       Fluid Accumulation (Malnutrition): other (see comments) (Not present) (07/07/25 1228)        A minimum of two characteristics is recommended for diagnosis of either severe or non-severe malnutrition.    Chart Review    Reason Seen: continuous nutrition monitoring    Malnutrition Screening Tool Results   Have you recently lost weight without trying?: No  Have you been eating poorly because of a decreased appetite?: No   MST Score: 0   Diagnosis:  Acute pancreatitis, 2/2 alcohol abuse  Leukocytosis, likely d/t inflammatory response to acute pancreatitis  Nausea and vomiting  Alcohol abuse   UDS: Benzodiazepine and cannabinoids positive   Hx of HTN     Relevant Medical History: alcohol induced pancreatitis    Scheduled Medications:  amLODIPine, 10 mg, Daily  folic acid, 1 mg, Daily  lisinopriL, 10 mg, QHS  multivitamin, 1 tablet, Daily  pantoprazole, 40 mg, Daily  thiamine, 100 mg, Daily    Continuous Infusions:  lactated ringers, Last Rate: 75 mL/hr at 07/07/25 1022    PRN Medications:  dextrose 50%, 12.5 g, PRN  dextrose 50%, 25 g,  "PRN  glucagon (human recombinant), 1 mg, PRN  glucose, 16 g, PRN  glucose, 24 g, PRN  hydrALAZINE, 10 mg, Q6H PRN  labetalol, 10 mg, Q6H PRN  lorazepam, 2 mg, Q4H PRN  melatonin, 6 mg, Nightly PRN  morphine, 2 mg, Q2H PRN  naloxone, 0.02 mg, PRN  ondansetron, 4 mg, Q6H PRN  sodium chloride 0.9%, 10 mL, Q12H PRN    Calorie Containing IV Medications: no significant kcals from medications at this time    Recent Labs   Lab 07/06/25 2023 07/06/25 2030 07/07/25 0312     --  136   K 4.1  --  4.6   CALCIUM 9.9  --  9.0   PHOS  --   --  3.9   MG  --   --  1.90     --  100   CO2 21*  --  22   BUN 8.5*  --  7.8*   CREATININE 0.83  --  0.79   EGFRNORACEVR >60  --  >60   GLU 88  --  85   BILITOT 1.8*  --  1.6*   ALKPHOS 96  --  81   ALT 19  --  15   AST 29  --  24   ALBUMIN 4.7  --  4.0   LIPASE 975*  --   --    AMYLASE  --  255*  --    WBC 13.20*  --  11.20   HGB 16.3  --  15.2   HCT 46.4  --  45.0     Nutrition Orders:  Diet Clear Liquid      Appetite/Oral Intake: fair/25-50% of meals  Factors Affecting Nutritional Intake: abdominal pain, decreased appetite, excessive alcohol intake, and nausea  Social Needs Impacting Access to Food: none identified  Food/Gnosticism/Cultural Preferences: none reported  Food Allergies: no known food allergies  Last Bowel Movement: 07/04/25  Wound(s):  skin intact    Comments    7/7/25 -- Pt reports 1 day h/o abdominal pain with nausea, good po intake prior to onset of symptoms; Tolerated apple juice this am, declines Boost Breeze at this time; will monitor diet progression; denies weight loss reporting weight stable; continue vitamin supplementation as ordered d/t alcohol use    Anthropometrics    Height: 6' 2" (188 cm), Height Method: Stated  Last Weight: 80.9 kg (178 lb 6.4 oz) (07/06/25 9077), Weight Method: Standard Scale  BMI (Calculated): 22.9  BMI Classification: normal (BMI 18.5-24.9)        Ideal Body Weight (IBW), Male: 190 lb     % Ideal Body Weight, Male (lb): 93.89 " %                 Usual Body Weight (UBW), k.6 kg  % Usual Body Weight: 99.38     Usual Weight Provided By: patient and EMR weight history    Wt Readings from Last 5 Encounters:   25 80.9 kg (178 lb 6.4 oz)   24 80 kg (176 lb 5.9 oz)   24 81.6 kg (180 lb)   24 83.3 kg (183 lb 10.3 oz)   23 81.6 kg (180 lb)     Weight Change(s) Since Admission: new admit  Wt Readings from Last 1 Encounters:   253 80.9 kg (178 lb 6.4 oz)   25 81.6 kg (180 lb)   Admit Weight: 81.6 kg (180 lb) (25), Weight Method: Standard Scale    Estimated Needs    Weight Used For Calorie Calculations: 81 kg (178 lb 9.2 oz)  Energy Calorie Requirements (kcal): 1924-3296 kcal (28 - 30 kcal/kg)  Energy Need Method: Kcal/kg  Weight Used For Protein Calculations: 81 kg (178 lb 9.2 oz)  Protein Requirements: 81-97 gm (1 - 1.2 gm/kg)  Fluid Requirements (mL): 3229-6917 ml (1ml/kcal)        Enteral Nutrition     Patient not receiving enteral nutrition at this time.    Parenteral Nutrition     Patient not receiving parenteral nutrition support at this time.    Evaluation of Received Nutrient Intake    Calories: not meeting estimated needs  Protein: not meeting estimated needs    Patient Education     Not applicable.    Nutrition Diagnosis     PES: Inadequate oral intake related to acute illness as evidenced by pancreatitis, < 50% oral intake x 1 day. (new)     PES:            Nutrition Interventions     Intervention(s): modified composition of meals/snacks, multivitamin/mineral supplement therapy, and collaboration with other providers  Intervention(s):      Goal: Meet greater than 80% of nutritional needs by follow-up. (new)  Goal: Maintain weight throughout hospitalization. (new)    Nutrition Goals & Monitoring     Dietitian will monitor: energy intake, weight change, and gastrointestinal profile  Discharge planning: continue low fat diet  Nutrition Risk/Follow-Up: dietitian will follow-up  one time per week   Please consult if re-assessment needed sooner.      Granddaughter

## 2025-07-08 VITALS
HEART RATE: 65 BPM | DIASTOLIC BLOOD PRESSURE: 78 MMHG | RESPIRATION RATE: 18 BRPM | TEMPERATURE: 98 F | HEIGHT: 74 IN | BODY MASS INDEX: 22.89 KG/M2 | SYSTOLIC BLOOD PRESSURE: 131 MMHG | WEIGHT: 178.38 LBS | OXYGEN SATURATION: 98 %

## 2025-07-08 LAB
ALBUMIN SERPL-MCNC: 3.4 G/DL (ref 3.5–5)
ALBUMIN/GLOB SERPL: 1 RATIO (ref 1.1–2)
ALP SERPL-CCNC: 62 UNIT/L (ref 40–150)
ALT SERPL-CCNC: 10 UNIT/L (ref 0–55)
ANION GAP SERPL CALC-SCNC: 10 MEQ/L
AST SERPL-CCNC: 14 UNIT/L (ref 11–45)
BASOPHILS # BLD AUTO: 0.02 X10(3)/MCL
BASOPHILS NFR BLD AUTO: 0.2 %
BILIRUB SERPL-MCNC: 0.9 MG/DL
BUN SERPL-MCNC: 4.4 MG/DL (ref 8.9–20.6)
CALCIUM SERPL-MCNC: 9.2 MG/DL (ref 8.4–10.2)
CHLORIDE SERPL-SCNC: 100 MMOL/L (ref 98–107)
CO2 SERPL-SCNC: 26 MMOL/L (ref 22–29)
CREAT SERPL-MCNC: 0.7 MG/DL (ref 0.72–1.25)
CREAT/UREA NIT SERPL: 6
EOSINOPHIL # BLD AUTO: 0.14 X10(3)/MCL (ref 0–0.9)
EOSINOPHIL NFR BLD AUTO: 1.5 %
ERYTHROCYTE [DISTWIDTH] IN BLOOD BY AUTOMATED COUNT: 12.9 % (ref 11.5–17)
GFR SERPLBLD CREATININE-BSD FMLA CKD-EPI: >60 ML/MIN/1.73/M2
GLOBULIN SER-MCNC: 3.5 GM/DL (ref 2.4–3.5)
GLUCOSE SERPL-MCNC: 116 MG/DL (ref 74–100)
HCT VFR BLD AUTO: 43.9 % (ref 42–52)
HGB BLD-MCNC: 14.8 G/DL (ref 14–18)
IMM GRANULOCYTES # BLD AUTO: 0.04 X10(3)/MCL (ref 0–0.04)
IMM GRANULOCYTES NFR BLD AUTO: 0.4 %
LYMPHOCYTES # BLD AUTO: 0.75 X10(3)/MCL (ref 0.6–4.6)
LYMPHOCYTES NFR BLD AUTO: 8 %
MAGNESIUM SERPL-MCNC: 2 MG/DL (ref 1.6–2.6)
MCH RBC QN AUTO: 30.8 PG (ref 27–31)
MCHC RBC AUTO-ENTMCNC: 33.7 G/DL (ref 33–36)
MCV RBC AUTO: 91.5 FL (ref 80–94)
MONOCYTES # BLD AUTO: 0.72 X10(3)/MCL (ref 0.1–1.3)
MONOCYTES NFR BLD AUTO: 7.7 %
NEUTROPHILS # BLD AUTO: 7.68 X10(3)/MCL (ref 2.1–9.2)
NEUTROPHILS NFR BLD AUTO: 82.2 %
NRBC BLD AUTO-RTO: 0 %
PHOSPHATE SERPL-MCNC: 3.2 MG/DL (ref 2.3–4.7)
PLATELET # BLD AUTO: 170 X10(3)/MCL (ref 130–400)
PMV BLD AUTO: 11.6 FL (ref 7.4–10.4)
POTASSIUM SERPL-SCNC: 3.8 MMOL/L (ref 3.5–5.1)
PROT SERPL-MCNC: 6.9 GM/DL (ref 6.4–8.3)
RBC # BLD AUTO: 4.8 X10(6)/MCL (ref 4.7–6.1)
SODIUM SERPL-SCNC: 136 MMOL/L (ref 136–145)
WBC # BLD AUTO: 9.35 X10(3)/MCL (ref 4.5–11.5)

## 2025-07-08 PROCEDURE — 25000003 PHARM REV CODE 250: Performed by: INTERNAL MEDICINE

## 2025-07-08 PROCEDURE — 25000003 PHARM REV CODE 250

## 2025-07-08 PROCEDURE — G0378 HOSPITAL OBSERVATION PER HR: HCPCS

## 2025-07-08 PROCEDURE — 63600175 PHARM REV CODE 636 W HCPCS

## 2025-07-08 PROCEDURE — 36415 COLL VENOUS BLD VENIPUNCTURE: CPT

## 2025-07-08 PROCEDURE — 83735 ASSAY OF MAGNESIUM: CPT

## 2025-07-08 PROCEDURE — 85025 COMPLETE CBC W/AUTO DIFF WBC: CPT

## 2025-07-08 PROCEDURE — 80053 COMPREHEN METABOLIC PANEL: CPT

## 2025-07-08 PROCEDURE — 84100 ASSAY OF PHOSPHORUS: CPT

## 2025-07-08 RX ORDER — FOLIC ACID 1 MG/1
1 TABLET ORAL DAILY
Qty: 90 TABLET | Refills: 2 | Status: SHIPPED | OUTPATIENT
Start: 2025-07-09 | End: 2026-04-05

## 2025-07-08 RX ORDER — PANTOPRAZOLE SODIUM 40 MG/1
40 TABLET, DELAYED RELEASE ORAL DAILY
Qty: 90 TABLET | Refills: 3 | Status: SHIPPED | OUTPATIENT
Start: 2025-07-08 | End: 2026-07-08

## 2025-07-08 RX ORDER — IBUPROFEN 400 MG/1
400 TABLET, FILM COATED ORAL EVERY 6 HOURS PRN
Status: DISCONTINUED | OUTPATIENT
Start: 2025-07-08 | End: 2025-07-08

## 2025-07-08 RX ORDER — IBUPROFEN 400 MG/1
400 TABLET, FILM COATED ORAL ONCE
Status: COMPLETED | OUTPATIENT
Start: 2025-07-08 | End: 2025-07-08

## 2025-07-08 RX ADMIN — THERA TABS 1 TABLET: TAB at 09:07

## 2025-07-08 RX ADMIN — PANTOPRAZOLE 40 MG: 40 TABLET, DELAYED RELEASE ORAL at 09:07

## 2025-07-08 RX ADMIN — FOLIC ACID 1 MG: 1 TABLET ORAL at 09:07

## 2025-07-08 RX ADMIN — MORPHINE SULFATE 2 MG: 2 INJECTION, SOLUTION INTRAMUSCULAR; INTRAVENOUS at 12:07

## 2025-07-08 RX ADMIN — AMLODIPINE BESYLATE 10 MG: 10 TABLET ORAL at 09:07

## 2025-07-08 RX ADMIN — Medication 100 MG: at 09:07

## 2025-07-08 RX ADMIN — IBUPROFEN 400 MG: 400 TABLET, FILM COATED ORAL at 04:07

## 2025-07-08 RX ADMIN — SODIUM CHLORIDE, POTASSIUM CHLORIDE, SODIUM LACTATE AND CALCIUM CHLORIDE: 600; 310; 30; 20 INJECTION, SOLUTION INTRAVENOUS at 02:07

## 2025-07-08 NOTE — PROGRESS NOTES
"[facility] Medicine  History & Physical        Patient Name: Osie Wellington  : 1995  MRN: [id number]  Patient Class: OP- Observation   Admission Date: 2025   Length of Stay: 0  Admitting Service: Hospital Medicine  Attending Physician: Jonny Riojas MD  PCP: Tati, Primary Doctor  Source of history: Patient, patient's family, and EMR.   Code status: Full Code     Chief Complaint   Abdominal Pain and Nausea      History of Present Illness   30 y.o. male with PMH of Pancreatitis and hypertension. presents to ED for abdominal pain and nausea, similar symptoms when he experienced pancreatitis in 2024. Patient states onset of abdominal pain was this morning and gradually worsening that he could not tolerate the pain. He rates 9/10 severity with sharp and localized at RUQ to mid upper abdomen. He also felt nauseated without vomiting. His grandmother gave him a "white pill" for nausea without improvement. He also admitted having drinking alcohol heavily for  celebration. He reports having history of alcohol abuse that caused pancreatitis and led him into rehab after discharged in 2024. He states had not been drinking alcohol after rehab for 7-8 months until 2025. He also admits he is not compliant to medications for hypertension. He denies fever/chills, HA, CP, SOB, sick-contact, diarrhea/constipation, falls, muscle or joint pain, depression and anxiety.       In ED, upon arrival, patient afebrile T 99, HR 73, RR 18, /113, SpO2 100% on room air. Labs significantly showed WBC 13.2, CO2 21, BUN/Cr 8.5/0.83, Bili total 1.8, Amylase 255, Lipase 975, . UDS revealed Benzo and Cannabinoids positive. CT A/P revealed edematous pancrease and liver with fatty infiltration. Patient was given LR, morphine, and prochlorperazine.      Interval History:  No acute events overnight. Patient reports complete resolution of abdominal pain. Able to tolerate " clear liquid diet without nausea or vomiting. Stooling and urinating without issue. Denies chest pain, SOB, fever/chills, abdominal pain, extremity pain.     ROS     Review of Systems   Constitutional:  Negative for chills, diaphoresis and fever.   Respiratory:  Negative for shortness of breath and wheezing.    Cardiovascular:  Negative for chest pain, palpitations and leg swelling.   Gastrointestinal:  Negative for abdominal pain, constipation, diarrhea, nausea and vomiting.   Neurological:  Negative for dizziness and headaches.   All other systems reviewed and are negative.     Past Medical History           Past Medical History:   Diagnosis Date    Pancreatitis 2020      Past Surgical History   History reviewed. No pertinent surgical history.  Social History      Social History            Tobacco Use    Smoking status: Never    Smokeless tobacco: Never   Substance Use Topics    Alcohol use: Yes       Comment: Binge drinker      Family History   Reviewed and noncontributory     Allergies   Patient has no known allergies.  Home Medications              Prior to Admission medications    Medication Sig Start Date End Date Taking? Authorizing Provider   amLODIPine (NORVASC) 10 MG tablet Take 1 tablet (10 mg total) by mouth once daily. 12/6/23 12/5/24   Monet Forrester MD   lisinopriL (PRINIVIL,ZESTRIL) 40 MG tablet Take 1 tablet (40 mg total) by mouth once daily. 11/8/24 11/8/25   Milton Rob MD   ondansetron (ZOFRAN-ODT) 4 MG TbDL Take 1 tablet (4 mg total) by mouth every 6 (six) hours as needed. 12/29/24     Kareem Morejon MD      Inpatient Medications   Scheduled Meds   [START ON 7/7/2025] amLODIPine  10 mg Oral Daily    [START ON 7/7/2025] folic acid  1 mg Oral Daily    [START ON 7/7/2025] multivitamin  1 tablet Oral Daily    [START ON 7/7/2025] pantoprazole  40 mg Oral Daily    [START ON 7/7/2025] thiamine  100 mg Oral Daily      Continuous Infusions   lactated ringers   Intravenous  Continuous 150 mL/hr at 07/06/25 2208 New Bag at 07/06/25 2208      PRN Meds     Current Facility-Administered Medications:     dextrose 50%, 12.5 g, Intravenous, PRN    dextrose 50%, 25 g, Intravenous, PRN    glucagon (human recombinant), 1 mg, Intramuscular, PRN    glucose, 16 g, Oral, PRN    glucose, 24 g, Oral, PRN    hydrALAZINE, 10 mg, Intravenous, Q6H PRN    labetalol, 10 mg, Intravenous, Q6H PRN    lorazepam, 2 mg, Intravenous, Q4H PRN    melatonin, 6 mg, Oral, Nightly PRN    morphine, 2 mg, Intravenous, Q2H PRN    naloxone, 0.02 mg, Intravenous, PRN    ondansetron, 4 mg, Oral, Q6H PRN    sodium chloride 0.9%, 10 mL, Intravenous, Q12H PRN     Physical Exam   Vital Signs  Temp:  [99 °F (37.2 °C)]   Pulse:  [68-80]   Resp:  [16-18]   BP: (180-214)/(104-128)   SpO2:  [9 %-100 %]       Physical Exam  Constitutional:       Appearance: Normal appearance. He is normal weight.   HENT:      Head: Normocephalic and atraumatic.   Eyes:      Extraocular Movements: Extraocular movements intact.      Conjunctiva/sclera: Conjunctivae normal.   Cardiovascular:      Rate and Rhythm: Normal rate and regular rhythm.      Pulses: Normal pulses.      Heart sounds: Normal heart sounds.   Pulmonary:      Effort: Pulmonary effort is normal.      Breath sounds: Normal breath sounds.   Abdominal:      General: Abdomen is flat. Bowel sounds are normal. There is no distension.      Palpations: Abdomen is soft.      Tenderness: There is no abdominal tenderness. There is no guarding.   Musculoskeletal:         General: No tenderness.      Right lower leg: No edema.      Left lower leg: No edema.   Skin:     General: Skin is warm and dry.   Neurological:      General: No focal deficit present.      Mental Status: He is alert and oriented to person, place, and time. Mental status is at baseline.   Psychiatric:         Mood and Affect: Mood normal.         Behavior: Behavior normal.         Thought Content: Thought content normal.          Judgment: Judgment normal.                 Labs   I have reviewed the following results below:  CBC      Recent Labs     07/06/25 2023   WBC 13.20*   RBC 5.16   HGB 16.3   HCT 46.4   MCV 89.9   MCH 31.6*   MCHC 35.1   RDW 12.8             BMP      Recent Labs     07/06/25 2023      K 4.1   CO2 21*   BUN 8.5*   CREATININE 0.83   CALCIUM 9.9      LFTs      Recent Labs     07/06/25 2023   ALBUMIN 4.7   GLOBULIN 4.0*   ALKPHOS 96   ALT 19   AST 29   BILITOT 1.8*   LIPASE 975*      Inflammatory Markers      Recent Labs     07/06/25 2030   *                Imaging      Imaging Results                  CT Abdomen Pelvis With IV Contrast NO Oral Contrast (Final result)  Result time 07/06/25 21:18:34            Final result by Anderson Wylie MD (07/06/25 21:18:34)                           Impression:        Changes consistent with acute pancreatitis an abscess or phlegmon is not demonstrated is not demonstrated.        Electronically signed by:Anderson Wylie MD  Date:                                            07/06/2025  Time:                                           21:18                     Narrative:     EXAMINATION:  CT ABDOMEN PELVIS WITH IV CONTRAST     CLINICAL HISTORY:  Pancreatitis, acute, severe;     TECHNIQUE:  Low dose axial images, sagittal and coronal reformations were obtained from the lung bases to the pubic symphysis following the IV administration of 100 mL of Omnipaque 350 no oral contrast was given.     Automatic exposure control (AEC) was utilized for dose reduction.     Dose:   187 mGycm     COMPARISON:  12/29/2024     FINDINGS:  Lung bases appear clear.  There is fatty infiltration of the liver.  Spleen appears normal.  The pancreas is edematous.  A localized fluid collection is not noted.  Gallbladder appears normal.  Biliary system appears normal.  The adrenals are not enlarged.  Kidneys appear normal.  Aorta shows no evidence of an aneurysm                                 "            Assessment & Plan      Acute pancreatitis, 2/2 alcohol abuse  Leukocytosis, likely d/t inflammatory response to acute pancreatitis  Nausea and vomiting (RESOLVED)  Epigastric and RUQ tenderness w/o radiation   CT A/P revealed edematous pancreas  Lipase 975, Amylase 255,  and WBC 13.2 on admission  LR at 75 mL/hr  Zofran 4 mg q6h PRN for N/V  Morphine 2 mg IV q4h PRN for severe pain  Continue Ibuprofen for PRN pain  Monitor Mg and Phos levels w/ AM labs  Start pureed diet, revert to clear liquid diet if not tolerated      Alcohol abuse  Alcohol level <10 on admission  Alcohol abstinence for 7-8 months and started drinking on July 4, 2025  Discussed alcohol cessation; will provide outpatient resources  Start folic acid 1 mg, thiamine 100 mg, and multivitamin 1 tab PO daily  Replete electrolytes as needed  CIWA protocol     UDS: Benzodiazepine and cannabinoids positive  Patient denies taking Benzodiazepine, but admitted took a "white pill" grandmother gave him for nausea     Hx of HTN  /113 on admission  BP today 151/93  Patient not adherent to prescribed home BP medications  Initiate amlodipine 10 mg PO daily  Discontinued Labetalol 200mg BID  Continue home lisinopril  Hydralazine and labetalol PRN on board  CTM q4h  Counseled on importance of medication adherence    CODE STATUS: Full Code  Access: Peripheral  Antibiotics: None  Diet: Pureed Liquid  DVT Prophylaxis: Teds/SCDs  GI Prophylaxis: proton pump inhibitor   Fluids: lactated Ringer's 75 mL/hr.      Disposition: Discharge pending toleration of diet      Vinny Tucker MD  [facility] Internal Medicine, HO-1  07/08/2025        "

## 2025-07-08 NOTE — PROGRESS NOTES
AVS virtually reviewed with Osei Wellington in its entirety with emphasis on diet, medications, follow-up appointments and reasons to return to the ED. Patient also encouraged to utilize their patient portal. Ease and convenience of use reiterated. Education complete and patient voiced understanding. All questions answered. Discharge teaching complete.

## 2025-07-08 NOTE — DISCHARGE SUMMARY
[facility] Internal Medicine Discharge Summary  [facility]    Admitting Physician: Jonny Riojas MD  Attending Physician: Jonny Riojas MD  Date of admit: 7/6/2025  Date of discharge: No discharge date for patient encounter.    Condition: Good  Outcome: Condition has improved and patient is now ready for discharge.  Disposition: Home or Self Care    Hospital Course Summary and Problem List     Patient is a 30-year-old male with PMH of alcohol use disorder, alcohol-induced pancreatitis, and hypertension who presented for abdominal pain and nausea with non-bloody emesis, found to be alcohol-induced pancreatitis. On initial presentation to the ED, patient's blood pressure was 214/113 in setting of acute abdominal pain and hypertension medication non-adherence. During admission, patient was effectively treated with IV LR and PRN pain medication. Hypertension improved with Amlodipine 10mg and Lisinopril 10mg daily, but still elevated at discharge (142/86). Plan to discharge patient on home dose of Lisinopril (40mg) and Amlodipine 10mg, with outpatient follow-up. On discharge, patient's abdominal pain and n/v now completely resolved. Tolerating pureed diet. Patient counseled on importance of alcohol cessation and is being connected with relevant outpatient resources for alcohol cessation. Patient to follow up in IM clinic, please address hypertension as it has only been assessed by an outpatient alcohol rehab facility.       Discharge Procedure Orders   Ambulatory referral/consult to Internal Medicine   Standing Status: Future   Referral Priority: Routine Referral Type: Consultation   Referral Reason: Specialty Services Required   Requested Specialty: Internal Medicine   Number of Visits Requested: 1     Ambulatory referral/consult to Outpatient Case Management   Referral Priority: Routine Referral Type: Consultation   Referral Reason: Specialty Services Required   Number of  "Visits Requested: 1       To address at [facility] Visit:  Significant medication changes: Lisinopril 40mg daily  Results not resulted during admission: -  Recommended Labs: Blood Pressure  Recommended Imaging: -    Problem List  Alcohol induced acute pancreatitis  Leukocytosis, likely d/t inflammatory response to acute pancreatitis  Nausea and vomiting  Alcohol Use Disorder  Hypertension    Objective     Vital Signs:  Vitals  BP: (!) 142/86  Temp: 98.2 °F (36.8 °C)  Temp Source: Oral  Pulse: 63  Resp: 18  SpO2: 98 %  Height: 6' 2" (188 cm)  Weight: 80.9 kg (178 lb 6.4 oz)    Physical Exam  Physical Exam  Constitutional:       General: He is not in acute distress.     Appearance: Normal appearance. He is normal weight. He is not ill-appearing.   HENT:      Head: Normocephalic and atraumatic.   Eyes:      Extraocular Movements: Extraocular movements intact.      Conjunctiva/sclera: Conjunctivae normal.   Cardiovascular:      Rate and Rhythm: Normal rate and regular rhythm.      Pulses: Normal pulses.      Heart sounds: Normal heart sounds.   Pulmonary:      Effort: Pulmonary effort is normal.      Breath sounds: Normal breath sounds.   Abdominal:      General: Abdomen is flat. Bowel sounds are normal. There is no distension.      Palpations: Abdomen is soft.      Tenderness: There is no abdominal tenderness. There is no guarding.   Musculoskeletal:         General: No tenderness. Normal range of motion.      Right lower leg: No edema.      Left lower leg: No edema.   Skin:     General: Skin is warm and dry.   Neurological:      General: No focal deficit present.      Mental Status: He is alert and oriented to person, place, and time. Mental status is at baseline.   Psychiatric:         Mood and Affect: Mood normal.         Behavior: Behavior normal.         Thought Content: Thought content normal.         Judgment: Judgment normal.         Discharge Medications        Medication List        START taking these " medications      folic acid 1 MG tablet  Commonly known as: FOLVITE  Take 1 tablet (1 mg total) by mouth once daily.  Start taking on: July 9, 2025     multivitamin Tab  Take 1 tablet by mouth once daily.  Start taking on: July 9, 2025     pantoprazole 40 MG tablet  Commonly known as: PROTONIX  Take 1 tablet (40 mg total) by mouth once daily.            CONTINUE taking these medications      amLODIPine 10 MG tablet  Commonly known as: NORVASC  Take 1 tablet (10 mg total) by mouth once daily.     lisinopriL 40 MG tablet  Commonly known as: PRINIVIL,ZESTRIL  Take 1 tablet (40 mg total) by mouth once daily.            STOP taking these medications      ondansetron 4 MG Tbdl  Commonly known as: ZOFRAN-ODT               Where to Get Your Medications        These medications were sent to [place] [id number] [facility] [address]      Phone: [phone]   folic acid 1 MG tablet  multivitamin Tab  pantoprazole 40 MG tablet         Outpatient Follow Up       At this time, Osei Wellington is determined to have maximized benefits of IP hospitalization. he is discharged in stable condition with outpatient f/u recommendations and instructions. All questions were answered, and patient verbalized agreement with the POC. They were given return precautions prior to discharge including symptoms that should prompt return to ED or to call PCP.     Total time spent at discharge: >30 minutes    Vinny Tucker PGY-I  [facility] Internal Medicine

## 2025-07-08 NOTE — PLAN OF CARE
07/08/25 1332   Final Note   Assessment Type Final Discharge Note   Anticipated Discharge Disposition Home   Post-Acute Status   Discharge Delays None known at this time

## 2025-07-11 NOTE — PROGRESS NOTES
I have reviewed and concur with the resident's history, physical, assessment, and plan.  I have discussed with him all issues related to the diagnosis, workup and treatment plan. Care provided as reasonable and necessary.     Jonny Riojas MD  [facility]